# Patient Record
Sex: FEMALE | Race: WHITE | URBAN - METROPOLITAN AREA
[De-identification: names, ages, dates, MRNs, and addresses within clinical notes are randomized per-mention and may not be internally consistent; named-entity substitution may affect disease eponyms.]

---

## 2020-11-19 ENCOUNTER — APPOINTMENT (RX ONLY)
Dept: URBAN - METROPOLITAN AREA CLINIC 4 | Facility: CLINIC | Age: 75
Setting detail: DERMATOLOGY
End: 2020-11-19

## 2020-11-19 DIAGNOSIS — L57.0 ACTINIC KERATOSIS: ICD-10-CM

## 2020-11-19 DIAGNOSIS — D18.0 HEMANGIOMA: ICD-10-CM

## 2020-11-19 DIAGNOSIS — L82.1 OTHER SEBORRHEIC KERATOSIS: ICD-10-CM

## 2020-11-19 DIAGNOSIS — L81.4 OTHER MELANIN HYPERPIGMENTATION: ICD-10-CM

## 2020-11-19 DIAGNOSIS — H02.40 UNSPECIFIED PTOSIS OF EYELID: ICD-10-CM

## 2020-11-19 DIAGNOSIS — D22 MELANOCYTIC NEVI: ICD-10-CM

## 2020-11-19 PROBLEM — D18.01 HEMANGIOMA OF SKIN AND SUBCUTANEOUS TISSUE: Status: ACTIVE | Noted: 2020-11-19

## 2020-11-19 PROBLEM — D22.62 MELANOCYTIC NEVI OF LEFT UPPER LIMB, INCLUDING SHOULDER: Status: ACTIVE | Noted: 2020-11-19

## 2020-11-19 PROBLEM — D22.61 MELANOCYTIC NEVI OF RIGHT UPPER LIMB, INCLUDING SHOULDER: Status: ACTIVE | Noted: 2020-11-19

## 2020-11-19 PROBLEM — H02.401 UNSPECIFIED PTOSIS OF RIGHT EYELID: Status: ACTIVE | Noted: 2020-11-19

## 2020-11-19 PROBLEM — D22.5 MELANOCYTIC NEVI OF TRUNK: Status: ACTIVE | Noted: 2020-11-19

## 2020-11-19 PROCEDURE — 17003 DESTRUCT PREMALG LES 2-14: CPT

## 2020-11-19 PROCEDURE — 99202 OFFICE O/P NEW SF 15 MIN: CPT | Mod: 25

## 2020-11-19 PROCEDURE — ? COUNSELING

## 2020-11-19 PROCEDURE — ? LIQUID NITROGEN

## 2020-11-19 PROCEDURE — 17000 DESTRUCT PREMALG LESION: CPT

## 2020-11-19 PROCEDURE — ? ADDITIONAL NOTES

## 2020-11-19 ASSESSMENT — LOCATION SIMPLE DESCRIPTION DERM
LOCATION SIMPLE: RIGHT UPPER ARM
LOCATION SIMPLE: LEFT FOREARM
LOCATION SIMPLE: RIGHT UPPER BACK
LOCATION SIMPLE: LEFT FOREHEAD
LOCATION SIMPLE: RIGHT FOREHEAD
LOCATION SIMPLE: RIGHT SUPERIOR EYELID
LOCATION SIMPLE: RIGHT FOREARM
LOCATION SIMPLE: ABDOMEN
LOCATION SIMPLE: LEFT CHEEK
LOCATION SIMPLE: LOWER BACK
LOCATION SIMPLE: CHEST
LOCATION SIMPLE: LEFT LOWER LIP
LOCATION SIMPLE: LEFT UPPER ARM

## 2020-11-19 ASSESSMENT — LOCATION DETAILED DESCRIPTION DERM
LOCATION DETAILED: SUBXIPHOID
LOCATION DETAILED: RIGHT ANTERIOR DISTAL UPPER ARM
LOCATION DETAILED: RIGHT ANTECUBITAL SKIN
LOCATION DETAILED: PERIUMBILICAL SKIN
LOCATION DETAILED: RIGHT INFERIOR MEDIAL UPPER BACK
LOCATION DETAILED: RIGHT MEDIAL UPPER BACK
LOCATION DETAILED: LEFT INFERIOR CENTRAL MALAR CHEEK
LOCATION DETAILED: LEFT VENTRAL PROXIMAL FOREARM
LOCATION DETAILED: LEFT INFERIOR VERMILION BORDER
LOCATION DETAILED: RIGHT VENTRAL PROXIMAL FOREARM
LOCATION DETAILED: RIGHT LATERAL SUPERIOR EYELID
LOCATION DETAILED: RIGHT INFERIOR MEDIAL FOREHEAD
LOCATION DETAILED: LOWER STERNUM
LOCATION DETAILED: RIGHT INFERIOR UPPER BACK
LOCATION DETAILED: LEFT ANTECUBITAL SKIN
LOCATION DETAILED: LEFT VENTRAL DISTAL FOREARM
LOCATION DETAILED: SUPERIOR LUMBAR SPINE
LOCATION DETAILED: LEFT CENTRAL MALAR CHEEK
LOCATION DETAILED: LEFT INFERIOR MEDIAL FOREHEAD

## 2020-11-19 ASSESSMENT — LOCATION ZONE DERM
LOCATION ZONE: LIP
LOCATION ZONE: TRUNK
LOCATION ZONE: FACE
LOCATION ZONE: ARM
LOCATION ZONE: EYELID

## 2020-11-25 ENCOUNTER — PATIENT OUTREACH (OUTPATIENT)
Dept: HEALTH INFORMATION MANAGEMENT | Facility: OTHER | Age: 75
End: 2020-11-25

## 2020-11-25 NOTE — PROGRESS NOTES
Called member to schedule new PCP for [facility]. Appointment scheduled using EPIC. Member MENA verified.

## 2021-01-05 ASSESSMENT — ENCOUNTER SYMPTOMS
DIARRHEA: 0
CHILLS: 0
PALPITATIONS: 0
CONSTIPATION: 0
DEPRESSION: 0
SHORTNESS OF BREATH: 0
BLURRED VISION: 0
FEVER: 0
VOMITING: 0
NAUSEA: 0
WEAKNESS: 0

## 2021-01-05 NOTE — PROGRESS NOTES
History of Present Illness  75 year old female presents to clinic to establish care.  She notes some ADHD, which was formally tested in the 1980s.  She has been on Adderall ever since that time, with no changes in her medication.  She uses the Adderall 7 days a week, without holidays.  She is currently retired, but is still actively working through volunteering.  She has been out of her Adderall for about 1 month now.    She also notes some intermittent chest pain.  The pain is located on her left side and does radiate down her left arm, it is described as tingling when it comes.  The pain comes 3-4 times a week and comes with a very setting of activities, even sitting.  Last for the evening and then spontaneously resolved.  She does not take any medication for it.  There is no associated shortness of breath, nausea, vomiting, or diaphoresis.    Her eye doctor reported there might be some cholesterol he is seeing in her eye, and is wondering if she has high cholesterol.  Last labs through us were in 2012 and do indicate some hypercholesterolemia.  She is not on any medication for this.    She notes a chronic tremor in her hands bilaterally and has been on propanolol for this for several years.  She does not think the propanolol is helping and is wondering if it is still necessary.    She denies any other questions or concerns at this time.    ROS  Review of Systems   Constitutional: Negative for chills and fever.   HENT: Negative for hearing loss.    Eyes: Negative for blurred vision.   Respiratory: Negative for shortness of breath.    Cardiovascular: Negative for chest pain and palpitations.   Gastrointestinal: Negative for constipation, diarrhea, nausea and vomiting.   Genitourinary: Negative for dysuria and hematuria.   Skin: Negative for rash.   Neurological: Positive for tremors (chronic). Negative for weakness.        Chronic problems with concentration   Psychiatric/Behavioral: Negative for depression.  "    Medications  Current Outpatient Medications   Medication Sig Dispense Refill   • amphetamine-dextroamphetamine (ADDERALL, 30MG,) 30 MG tablet Take 30 mg by mouth every day.       No current facility-administered medications for this visit.      Allergies  No Known Allergies    Problem List  Patient Active Problem List   Diagnosis   • Attention deficit hyperactivity disorder (ADHD), predominantly inattentive type   • Overweight (BMI 25.0-29.9)     Past Medical History  Past Medical History:   Diagnosis Date   • Hyperlipidemia         Past Surgical History  History reviewed. No pertinent surgical history.     Past Family History  Family History   Problem Relation Age of Onset   • Allergies Mother    • Cancer Father         prostate   • Diabetes Sister    • Heart Disease Sister    • Stroke Brother    • Stroke Paternal Grandmother    • Cancer Paternal Grandfather         throat   • No Known Problems Sister    • Hyperlipidemia Son    • Heart Disease Daughter    • Hyperlipidemia Daughter      Social History  She reports eating a healthy and balanced diet, but unfortunately does not get regular exercise.  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 She drinks an average of 4-5 alcoholic beverages per week. She denies any tobacco product or illicit drug use. She is sexually active with one, male partner and they do not use any form of contraception.     Physical Exam  /82 (BP Location: Right arm, Patient Position: Sitting, BP Cuff Size: Adult)   Pulse 68   Temp 36.6 °C (97.8 °F) (Temporal)   Resp 14   Ht 1.6 m (5' 3\")   Wt 75.7 kg (166 lb 14.2 oz)   SpO2 97%   BMI 29.56 kg/m²   Physical Exam   Constitutional: She is well-developed, well-nourished, and in no distress. No distress.   HENT:   Head: Normocephalic and atraumatic.   Right Ear: Tympanic membrane, external ear and ear canal normal.   Left Ear: Tympanic membrane, external ear " and ear canal normal.   Eyes: Pupils are equal, round, and reactive to light. Right eye exhibits no discharge. Left eye exhibits no discharge. No scleral icterus.   Neck: No thyromegaly present.   Cardiovascular: Normal rate, regular rhythm and normal heart sounds.   Pulmonary/Chest: Effort normal and breath sounds normal. No respiratory distress.   Abdominal: Soft. Bowel sounds are normal. She exhibits no distension. There is no abdominal tenderness.   Musculoskeletal:         General: No edema.   Neurological: She is alert.   Skin: Skin is warm and dry. She is not diaphoretic.   Psychiatric: Affect and judgment normal.     Assessment & Plan  1. Attention deficit hyperactivity disorder (ADHD), predominantly inattentive type  I informed the patient that I do not prescribe controlled substances on her first encounter, she will make a follow-up in a couple weeks, to see how she is doing without the..  I did review her PDMP.  She is aware of the need to sign a controlled substance agreement on required annual drug screen.  We'll plan to continue the same dose of Adderall if her concentration problems continue without it.    2. Chest pain, unspecified type  When asked why she doesn't go to the emergency department for these chest pains, she states they are not really that bad.  No abnormalities noted on cardiac exam today.  We'll get some labs to further evaluate and continue to monitor.    3. Hypercholesterolemia  Repeat lipid panel and prescribe medication as appropriate.  - Comp Metabolic Panel; Future  - Lipid Profile; Future    4. Tremor  Since the propanolol is not effective we will trial stopping it.  If she would like to restart it again she will let me know.    5. Need for hepatitis C screening test  One-time hepatitis C screening is needed, order has been placed.  - HCV Scrn ( [id number] 1xLife); Future    6.  Overweight (BMI 25.0-29.9)  We reviewed the recommended amount of exercise, the patient will start  trying to incorporate exercise in her daily routine.    Return in about 2 weeks (around 1/21/2021) for ADHD management .    Divine Gamboa M.D.

## 2021-01-07 ENCOUNTER — OFFICE VISIT (OUTPATIENT)
Dept: MEDICAL GROUP | Facility: MEDICAL CENTER | Age: 76
End: 2021-01-07
Payer: MEDICARE

## 2021-01-07 VITALS
SYSTOLIC BLOOD PRESSURE: 128 MMHG | HEIGHT: 63 IN | DIASTOLIC BLOOD PRESSURE: 82 MMHG | BODY MASS INDEX: 29.57 KG/M2 | TEMPERATURE: 97.8 F | OXYGEN SATURATION: 97 % | RESPIRATION RATE: 14 BRPM | WEIGHT: 166.89 LBS | HEART RATE: 68 BPM

## 2021-01-07 DIAGNOSIS — F90.0 ATTENTION DEFICIT HYPERACTIVITY DISORDER (ADHD), PREDOMINANTLY INATTENTIVE TYPE: ICD-10-CM

## 2021-01-07 DIAGNOSIS — R25.1 TREMOR: ICD-10-CM

## 2021-01-07 DIAGNOSIS — Z11.59 NEED FOR HEPATITIS C SCREENING TEST: ICD-10-CM

## 2021-01-07 DIAGNOSIS — E78.00 HYPERCHOLESTEROLEMIA: ICD-10-CM

## 2021-01-07 DIAGNOSIS — E66.3 OVERWEIGHT (BMI 25.0-29.9): ICD-10-CM

## 2021-01-07 DIAGNOSIS — R07.9 CHEST PAIN, UNSPECIFIED TYPE: ICD-10-CM

## 2021-01-07 PROCEDURE — 99203 OFFICE O/P NEW LOW 30 MIN: CPT | Performed by: FAMILY MEDICINE

## 2021-01-07 RX ORDER — DEXTROAMPHETAMINE SACCHARATE, AMPHETAMINE ASPARTATE, DEXTROAMPHETAMINE SULFATE AND AMPHETAMINE SULFATE 7.5; 7.5; 7.5; 7.5 MG/1; MG/1; MG/1; MG/1
30 TABLET ORAL DAILY
COMMUNITY
End: 2021-01-26 | Stop reason: SDUPTHER

## 2021-01-07 RX ORDER — PROPRANOLOL HYDROCHLORIDE 10 MG/1
10 TABLET ORAL 2 TIMES DAILY
COMMUNITY
End: 2021-01-07

## 2021-01-07 ASSESSMENT — PATIENT HEALTH QUESTIONNAIRE - PHQ9
CLINICAL INTERPRETATION OF PHQ2 SCORE: 2
SUM OF ALL RESPONSES TO PHQ QUESTIONS 1-9: 5
5. POOR APPETITE OR OVEREATING: 1 - SEVERAL DAYS

## 2021-01-07 ASSESSMENT — ENCOUNTER SYMPTOMS: TREMORS: 1

## 2021-01-15 DIAGNOSIS — Z23 NEED FOR VACCINATION: ICD-10-CM

## 2021-01-21 ENCOUNTER — TELEPHONE (OUTPATIENT)
Dept: MEDICAL GROUP | Facility: MEDICAL CENTER | Age: 76
End: 2021-01-21

## 2021-01-21 NOTE — TELEPHONE ENCOUNTER
ESTABLISHED PATIENT PRE-VISIT PLANNING     Patient was NOT contacted to complete PVP.     Note: Patient will not be contacted if there is no indication to call.     1.  Reviewed notes from the last few office visits within the medical group: Yes    2.  If any orders were placed at last visit or intended to be done for this visit (i.e. 6 mos follow-up), do we have Results/Consult Notes?         •  Labs - Labs ordered, but not to be completed until 01/20/22.  Note: If patient appointment is for lab review and patient did not complete labs, check with provider if OK to reschedule patient until labs completed.       •  Imaging - Imaging was not ordered at last office visit.       •  Referrals - No referrals were ordered at last office visit.    3. Is this appointment scheduled as a Hospital Follow-Up? No    4.  Immunizations were updated in Epic using Reconcile Outside Information activity? Yes    5.  Patient is due for the following Health Maintenance Topics:   Health Maintenance Due   Topic Date Due   • Annual Wellness Visit  1945   • HEPATITIS C SCREENING  1945   • COVID-19 Vaccine (1 of 2) 08/20/1962   • IMM DTaP/Tdap/Td Vaccine (1 - Tdap) 08/20/1965   • IMM ZOSTER VACCINES (1 of 2) 08/20/1996     6.  AHA (Pulse8) form printed for Provider? N/A

## 2021-01-25 NOTE — PROGRESS NOTES
"History of Present Illness  75 year old female presents to clinic for follow-up of her ADHD. She was formerly tested in the 1980s and has been on Adderall since that time.  When she has Adderall, she uses it every day of the week, and does not take all the days.  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  She has been out of her Adderall for about 1 month now, and does have a very hard time focusing at work.      She denies any other questions or concerns at this time.    Current problem list, current medication, and past medical/surgical history were reviewed.     ROS  See HPI    Physical Exam  /72 (BP Location: Left arm, Patient Position: Sitting, BP Cuff Size: Adult)   Pulse 72   Temp 36.7 °C (98.1 °F) (Temporal)   Resp 16   Ht 1.575 m (5' 2\")   Wt 76 kg (167 lb 8.8 oz)   SpO2 96%   BMI 30.65 kg/m²   Physical Exam   Constitutional: She is well-developed, well-nourished, and in no distress. No distress.   Cardiovascular: Normal rate, regular rhythm and normal heart sounds.   Pulmonary/Chest: Effort normal and breath sounds normal. No respiratory distress.   Abdominal: Soft. Bowel sounds are normal.   Neurological: She is alert.   Skin: Skin is warm and dry. She is not diaphoretic.   Psychiatric: Affect and judgment normal.     Assessment & Plan  1. Attention deficit hyperactivity disorder (ADHD), predominantly inattentive type  I did send a referral of her Adderall, but encouraged her to take it only Monday through Friday, or on the days when she is working.  Controlled substance agreement signed.  We will plan to get an annual drug screen at her next appointment. Obtained and reviewed patient utilization report from [facility] pharmacy database on 1/26/2021 11:20 AM  prior to writing prescription for controlled substance II, III or IV per [place] bill . Based on assessment of the report, my physical exam, and the patient's " health problem, the prescription is medically necessary.   - amphetamine-dextroamphetamine (ADDERALL, 30MG,) 30 MG tablet; Take 1 Tab by mouth every day for 60 days.  Dispense: 60 Tab; Refill: 0  - Controlled Substance Treatment Agreement      Return in about 3 months (around 4/26/2021) for ADHD management .    Divine Gamboa M.D.

## 2021-01-26 ENCOUNTER — OFFICE VISIT (OUTPATIENT)
Dept: MEDICAL GROUP | Facility: MEDICAL CENTER | Age: 76
End: 2021-01-26
Payer: MEDICARE

## 2021-01-26 VITALS
DIASTOLIC BLOOD PRESSURE: 72 MMHG | RESPIRATION RATE: 16 BRPM | OXYGEN SATURATION: 96 % | HEART RATE: 72 BPM | WEIGHT: 167.55 LBS | SYSTOLIC BLOOD PRESSURE: 140 MMHG | BODY MASS INDEX: 30.83 KG/M2 | HEIGHT: 62 IN | TEMPERATURE: 98.1 F

## 2021-01-26 DIAGNOSIS — F90.0 ATTENTION DEFICIT HYPERACTIVITY DISORDER (ADHD), PREDOMINANTLY INATTENTIVE TYPE: ICD-10-CM

## 2021-01-26 PROCEDURE — 99213 OFFICE O/P EST LOW 20 MIN: CPT | Performed by: FAMILY MEDICINE

## 2021-01-26 RX ORDER — DEXTROAMPHETAMINE SACCHARATE, AMPHETAMINE ASPARTATE, DEXTROAMPHETAMINE SULFATE AND AMPHETAMINE SULFATE 7.5; 7.5; 7.5; 7.5 MG/1; MG/1; MG/1; MG/1
30 TABLET ORAL DAILY
Qty: 60 TAB | Refills: 0 | Status: SHIPPED | OUTPATIENT
Start: 2021-01-26 | End: 2021-03-27

## 2021-01-26 RX ORDER — IBUPROFEN 800 MG/1
800 TABLET ORAL EVERY 8 HOURS PRN
Qty: 30 TAB | Refills: 0 | Status: SHIPPED | OUTPATIENT
Start: 2021-01-26 | End: 2022-08-27

## 2021-04-06 ENCOUNTER — PATIENT MESSAGE (OUTPATIENT)
Dept: HEALTH INFORMATION MANAGEMENT | Facility: OTHER | Age: 76
End: 2021-04-06

## 2021-06-14 ENCOUNTER — OFFICE VISIT (OUTPATIENT)
Dept: MEDICAL GROUP | Facility: PHYSICIAN GROUP | Age: 76
End: 2021-06-14
Payer: MEDICARE

## 2021-06-14 VITALS
OXYGEN SATURATION: 96 % | DIASTOLIC BLOOD PRESSURE: 70 MMHG | HEIGHT: 61 IN | HEART RATE: 69 BPM | TEMPERATURE: 97.5 F | WEIGHT: 162.1 LBS | SYSTOLIC BLOOD PRESSURE: 138 MMHG | BODY MASS INDEX: 30.61 KG/M2

## 2021-06-14 DIAGNOSIS — E78.2 MIXED HYPERLIPIDEMIA: ICD-10-CM

## 2021-06-14 DIAGNOSIS — G25.0 ESSENTIAL TREMOR: ICD-10-CM

## 2021-06-14 DIAGNOSIS — Z13.6 ENCOUNTER FOR LIPID SCREENING FOR CARDIOVASCULAR DISEASE: ICD-10-CM

## 2021-06-14 DIAGNOSIS — R20.2 LEG PARESTHESIA: ICD-10-CM

## 2021-06-14 DIAGNOSIS — M79.10 MUSCLE PAIN: ICD-10-CM

## 2021-06-14 DIAGNOSIS — E55.9 VITAMIN D DEFICIENCY: ICD-10-CM

## 2021-06-14 DIAGNOSIS — Z11.59 ENCOUNTER FOR HEPATITIS C SCREENING TEST FOR LOW RISK PATIENT: ICD-10-CM

## 2021-06-14 DIAGNOSIS — Z13.220 ENCOUNTER FOR LIPID SCREENING FOR CARDIOVASCULAR DISEASE: ICD-10-CM

## 2021-06-14 DIAGNOSIS — F90.0 ATTENTION DEFICIT HYPERACTIVITY DISORDER (ADHD), PREDOMINANTLY INATTENTIVE TYPE: ICD-10-CM

## 2021-06-14 PROBLEM — E66.3 OVERWEIGHT (BMI 25.0-29.9): Status: RESOLVED | Noted: 2021-01-07 | Resolved: 2021-06-14

## 2021-06-14 PROCEDURE — 99204 OFFICE O/P NEW MOD 45 MIN: CPT | Performed by: INTERNAL MEDICINE

## 2021-06-14 RX ORDER — DEXTROAMPHETAMINE SACCHARATE, AMPHETAMINE ASPARTATE, DEXTROAMPHETAMINE SULFATE AND AMPHETAMINE SULFATE 7.5; 7.5; 7.5; 7.5 MG/1; MG/1; MG/1; MG/1
30 TABLET ORAL
COMMUNITY
End: 2021-08-25 | Stop reason: SDUPTHER

## 2021-06-14 RX ORDER — PROPRANOLOL HYDROCHLORIDE 10 MG/1
10 TABLET ORAL
COMMUNITY
Start: 2021-04-03 | End: 2021-08-25 | Stop reason: SDUPTHER

## 2021-06-14 RX ORDER — MULTIVIT WITH MINERALS/LUTEIN
TABLET ORAL
COMMUNITY
End: 2022-08-27

## 2021-06-14 RX ORDER — CHOLECALCIFEROL (VITAMIN D3) 125 MCG
500 CAPSULE ORAL DAILY
COMMUNITY
End: 2022-08-27

## 2021-06-14 NOTE — LETTER
[employer]  Farhana Quintanilla D.O.  [address]  [place] [address]  Fax: [phone]   Authorization for Release/Disclosure of   Protected Health Information   Name: RESHMA OVIEDO : 1945 SSN: [id number]   Address: [address] [place] [address] Phone:    [phone] (home)    I authorize the entity listed below to release/disclose the PHI below to:   [employer]/Farhana Quintanilla D.O. and Farhana Quintanilla D.O.   Provider or Entity Name:  [facility]   Address   City, State, [facility]   Phone:      Fax:     Reason for request: continuity of care   Information to be released:    [  ] LAST COLONOSCOPY,  including any PATH REPORT and follow-up  [  ] LAST FIT/COLOGUARD RESULT [  ] LAST DEXA  [  ] LAST MAMMOGRAM  [  ] LAST PAP  [  ] LAST LABS [  ] RETINA EXAM REPORT  [  ] IMMUNIZATION RECORDS  [ x ] Release all info      [ x ] Check here and initial the line next to each item to release ALL health information INCLUDING  _____ Care and treatment for drug and / or alcohol abuse  _____ HIV testing, infection status, or AIDS  _____ Genetic Testing    DATES OF SERVICE OR TIME PERIOD TO BE DISCLOSED: __-___________  I understand and acknowledge that:  * This Authorization may be revoked at any time by you in writing, except if your health information has already been used or disclosed.  * Your health information that will be used or disclosed as a result of you signing this authorization could be re-disclosed by the recipient. If this occurs, your re-disclosed health information may no longer be protected by State or Federal laws.  * You may refuse to sign this Authorization. Your refusal will not affect your ability to obtain treatment.  * This Authorization becomes effective upon signing and will  on (date) __________.      If no date is indicated, this Authorization will  one (1) year from the signature date.    Name: Reshma Oviedo    Signature:   Date:     2021        PLEASE FAX REQUESTED RECORDS BACK TO: [phone]

## 2021-06-14 NOTE — PROGRESS NOTES
Subjective:     CC:  Establish care    HISTORY OF THE PRESENT ILLNESS: Cherelle Crystal is a 75 y.o. female here today to establish primary medical care and discuss the below stated chronic medical conditions. Cherelle is unaccompanied for today's visit.    Problem   Essential Tremor    She reports essential tremor noticeable mostly in the left hand 2nd digit but it can also involve both hands. It has slightly progressed over the past year. She was given propranolol 10 mg which she has not used regularly at this point, worried about potential side effects. I do not have outside records available to review.     Leg Paresthesia    She reports abnormal sensations in her bilateral lower extremities. Starts int he feet and goes up to the knees with some additional abnormalities in the bilateral upper extremities. No recent lab work. No preceding injury. Denies known side effects from from medications.     Vitamin D Deficiency    She has a history of vitamin D deficiency, no current supplementation. No recent lab evaluation.     Mixed Hyperlipidemia       Ref. Range 9/4/2012 12:15   Cholesterol,Tot Latest Ref Range: 100 - 199 mg/dL 223 (H)   Triglycerides Latest Ref Range: 0 - 149 mg/dL 64   HDL Latest Ref Range: >=40 mg/dL 76   LDL Latest Ref Range: <100 mg/dL 134     She has a past history of elevated cholesterol, has not taken medication and no recent lab work or physician visits. No current pharmacotherapy.     Muscle Pain    She reports abnormal sensation in the bilateral legs going from feet upward. She also report similar feelings in her bilateral arms. No clear inciting cause, no recent lab evaluation. No prior medical records available for review.     Attention Deficit Hyperactivity Disorder (Adhd), Predominantly Inattentive Type    She reports diagnosis of ADHD at age 41. This was made by her primary care physician, she does not ever recall seeing a psychiatrist. She had challenges with attention related to her job.  "She was started on Adderall which was increased to 30 mg within the first few months. She denies any side effects from the medicine. She reports not seeing a physician regularly since 2012, unclear how this was being filled without regular labs or visits. 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     Overweight (Bmi 25.0-29.9) (Resolved)        Current Medications:  Current Outpatient Medications Ordered in Epic   Medication Sig Dispense Refill   • propranolol (INDERAL) 10 MG Tab Take 10 mg by mouth.     • amphetamine-dextroamphetamine (ADDERALL, 30MG,) 30 MG tablet Take 30 mg by mouth 1 time a day as needed.     • cyanocobalamin (VITAMIN B-12) 500 MCG Tab Take 500 mcg by mouth every day.     • Glucosamine-Chondroitin (GLUCOSAMINE CHONDR COMPLEX PO) Take  by mouth.     • Multiple Vitamins-Minerals (PRESERVISION AREDS PO) Take  by mouth.     • Ascorbic Acid (VITAMIN C) 1000 MG Tab Take  by mouth.     • ibuprofen (MOTRIN) 800 MG Tab Take 1 Tab by mouth every 8 hours as needed. 30 Tab 0     No current Epic-ordered facility-administered medications on file.       PMH, PSH, Social History, Medications, Allergies, FMH updated and reviewed as documented:    Objective:   Physical Exam:    Vitals: /70 (BP Location: Left arm, Patient Position: Sitting, BP Cuff Size: Adult)   Pulse 69   Temp 36.4 °C (97.5 °F) (Temporal)   Ht 1.549 m (5' 1\")   Wt 73.5 kg (162 lb 1.6 oz)   SpO2 96%    BMI: Body mass index is 30.63 kg/m².  Physical Exam  Constitutional:       General: She is not in acute distress.     Appearance: Normal appearance. She is not ill-appearing.   HENT:      Right Ear: Tympanic membrane, ear canal and external ear normal. There is no impacted cerumen.      Left Ear: Tympanic membrane, ear canal and external ear normal. There is no impacted cerumen.   Eyes:      General: No scleral icterus.     Conjunctiva/sclera: " Conjunctivae normal.      Pupils: Pupils are equal, round, and reactive to light.   Neck:      Vascular: No carotid bruit.   Cardiovascular:      Rate and Rhythm: Normal rate and regular rhythm.      Pulses: Normal pulses.      Heart sounds: No murmur heard.     Pulmonary:      Effort: Pulmonary effort is normal. No respiratory distress.      Breath sounds: Normal breath sounds. No wheezing.   Musculoskeletal:      Right lower leg: No edema.      Left lower leg: No edema.   Skin:     General: Skin is warm and dry.      Findings: No rash.   Psychiatric:         Mood and Affect: Mood normal.         Behavior: Behavior normal.         Thought Content: Thought content normal.         Judgment: Judgment normal.          Assessment & Plan:   Cherelle is a 75 y.o. female with the following:  Problem List Items Addressed This Visit     Attention deficit hyperactivity disorder (ADHD), predominantly inattentive type     Chronic and decompensated problem. Will refer her to psychiatry to establish care, she has never seen psychiatry and reports the ADHD diagnosis was given in her 40s. No records available as this was done by her former PCP in [place] and that clinic has since been shut down. Will get her labs updated to ensure safety before resuming this medicine.         Relevant Orders    REFERRAL TO PSYCHIATRY    Essential tremor     Chronic and ongoing issue. Will request previous medical records. Tremor very subtle at this time. Can continue propranolol 10 mg as needed, she reports feeling slight orthostasis so trying to limit. Can consider other alternative therapies if symptoms progress.         Relevant Orders    CBC WITH DIFFERENTIAL    Comp Metabolic Panel    VITAMIN D,25 HYDROXY    Leg paresthesia     New problem that requires additional evaluation. Will update lab work to look for organic causes to her symptoms. May require imaging for further elucidation based on findings. Will have her return in a few weeks to review.          Relevant Orders    CBC WITH DIFFERENTIAL    Comp Metabolic Panel    TSH WITH REFLEX TO FT4    VITAMIN B12    Vitamin D deficiency     New problem, requires follow up. Update vitamin D level and adjust supplementation as indicated.         Mixed hyperlipidemia     New problem, no prior treatment or evaluation. Will update lipid panel and recommend treatment based on findings. Will request records from previous physician.         Relevant Medications    propranolol (INDERAL) 10 MG Tab    Other Relevant Orders    CBC WITH DIFFERENTIAL    Comp Metabolic Panel    Lipid Profile    TSH WITH REFLEX TO FT4    Muscle pain     New problem, requires follow up. Check for reversible causes/deficiencies. Unlikely to be a medicine side effect. Evaluate for polymyalgia rheumatica with ESR. Will have her follow up in a few weeks and decide on additional evaluation at that time. Check B12, vitamin D, and thyroid labs as well.         Relevant Orders    Sed Rate      Other Visit Diagnoses     Encounter for lipid screening for cardiovascular disease        Relevant Orders    Lipid Profile    Encounter for hepatitis C screening test for low risk patient        Relevant Orders    HEP C VIRUS ANTIBODY           RTC: Return in about 3 weeks (around 7/5/2021) for lab draw with RNAna #1 on same day.    I spent a total of 50 minutes with record review, exam, communication with the patient, communication with other providers, and documentation of this encounter.    PLEASE NOTE: This dictation was created using voice recognition software. I have made every reasonable attempt to correct obvious errors, but I expect that there are errors of grammar and possibly content that I did not discover before finalizing the note.      Farhana Quintanilla, DO  Geriatric and Internal Medicine  [facility]

## 2021-06-15 NOTE — ASSESSMENT & PLAN NOTE
Chronic and decompensated problem. Will refer her to psychiatry to establish care, she has never seen psychiatry and reports the ADHD diagnosis was given in her 40s. No records available as this was done by her former PCP in [place] and that clinic has since been shut down. Will get her labs updated to ensure safety before resuming this medicine.  
Chronic and ongoing issue. Will request previous medical records. Tremor very subtle at this time. Can continue propranolol 10 mg as needed, she reports feeling slight orthostasis so trying to limit. Can consider other alternative therapies if symptoms progress.  
New problem that requires additional evaluation. Will update lab work to look for organic causes to her symptoms. May require imaging for further elucidation based on findings. Will have her return in a few weeks to review.  
New problem, no prior treatment or evaluation. Will update lipid panel and recommend treatment based on findings. Will request records from previous physician.  
New problem, requires follow up. Check for reversible causes/deficiencies. Unlikely to be a medicine side effect. Evaluate for polymyalgia rheumatica with ESR. Will have her follow up in a few weeks and decide on additional evaluation at that time. Check B12, vitamin D, and thyroid labs as well.  
New problem, requires follow up. Update vitamin D level and adjust supplementation as indicated.  
normal (ped)...

## 2021-07-06 ENCOUNTER — TELEPHONE (OUTPATIENT)
Dept: MEDICAL GROUP | Facility: PHYSICIAN GROUP | Age: 76
End: 2021-07-06

## 2021-07-06 NOTE — TELEPHONE ENCOUNTER
Pt missed 8am blood draw and Shingrix administration appt. Called pt, no answer, LVM asking pt to call [phone] and re-schedule appt.

## 2021-07-12 ENCOUNTER — NON-PROVIDER VISIT (OUTPATIENT)
Dept: MEDICAL GROUP | Facility: PHYSICIAN GROUP | Age: 76
End: 2021-07-12
Payer: MEDICARE

## 2021-07-12 ENCOUNTER — HOSPITAL ENCOUNTER (OUTPATIENT)
Facility: MEDICAL CENTER | Age: 76
End: 2021-07-12
Attending: INTERNAL MEDICINE
Payer: MEDICARE

## 2021-07-12 DIAGNOSIS — E78.2 MIXED HYPERLIPIDEMIA: ICD-10-CM

## 2021-07-12 DIAGNOSIS — Z11.59 ENCOUNTER FOR HEPATITIS C SCREENING TEST FOR LOW RISK PATIENT: ICD-10-CM

## 2021-07-12 DIAGNOSIS — Z23 NEED FOR VACCINATION: Primary | ICD-10-CM

## 2021-07-12 DIAGNOSIS — Z13.6 ENCOUNTER FOR LIPID SCREENING FOR CARDIOVASCULAR DISEASE: ICD-10-CM

## 2021-07-12 DIAGNOSIS — M79.10 MUSCLE PAIN: ICD-10-CM

## 2021-07-12 DIAGNOSIS — Z13.220 ENCOUNTER FOR LIPID SCREENING FOR CARDIOVASCULAR DISEASE: ICD-10-CM

## 2021-07-12 DIAGNOSIS — R20.2 LEG PARESTHESIA: ICD-10-CM

## 2021-07-12 DIAGNOSIS — G25.0 ESSENTIAL TREMOR: ICD-10-CM

## 2021-07-12 PROCEDURE — 82607 VITAMIN B-12: CPT

## 2021-07-12 PROCEDURE — 80061 LIPID PANEL: CPT

## 2021-07-12 PROCEDURE — 85652 RBC SED RATE AUTOMATED: CPT

## 2021-07-12 PROCEDURE — 86803 HEPATITIS C AB TEST: CPT

## 2021-07-12 PROCEDURE — 85025 COMPLETE CBC W/AUTO DIFF WBC: CPT

## 2021-07-12 PROCEDURE — 90750 HZV VACC RECOMBINANT IM: CPT | Performed by: INTERNAL MEDICINE

## 2021-07-12 PROCEDURE — 82306 VITAMIN D 25 HYDROXY: CPT

## 2021-07-12 PROCEDURE — 84443 ASSAY THYROID STIM HORMONE: CPT

## 2021-07-12 PROCEDURE — 80053 COMPREHEN METABOLIC PANEL: CPT

## 2021-07-12 PROCEDURE — 90471 IMMUNIZATION ADMIN: CPT | Performed by: INTERNAL MEDICINE

## 2021-07-12 PROCEDURE — 36415 COLL VENOUS BLD VENIPUNCTURE: CPT | Performed by: INTERNAL MEDICINE

## 2021-07-12 NOTE — PROGRESS NOTES
Patient arrived for blood draw.   Verified patient's name/date-of-birth and reason for visit before procedure was started. Patient's left antecubital cleansed. Venipuncture attempts X1. Blood draw completed on patient's left AC.     Applied pressure afterwards and placed Coban on site of venipuncture with directions for patient to remove within the next hour. Patient tolerated procedure well, no adverse reactions. Patient ambulated safely upon leaving clinic.   Completed labels were placed on blood samples in draw room with patient present. Appropriate blood samples were centrifuged. Blood samples were then placed in locked lab box for  pick-up.

## 2021-07-13 LAB
25(OH)D3 SERPL-MCNC: 23 NG/ML (ref 30–100)
ALBUMIN SERPL BCP-MCNC: 4.3 G/DL (ref 3.2–4.9)
ALBUMIN/GLOB SERPL: 1.5 G/DL
ALP SERPL-CCNC: 52 U/L (ref 30–99)
ALT SERPL-CCNC: 14 U/L (ref 2–50)
ANION GAP SERPL CALC-SCNC: 13 MMOL/L (ref 7–16)
AST SERPL-CCNC: 19 U/L (ref 12–45)
BASOPHILS # BLD AUTO: 0.8 % (ref 0–1.8)
BASOPHILS # BLD: 0.05 K/UL (ref 0–0.12)
BILIRUB SERPL-MCNC: 0.3 MG/DL (ref 0.1–1.5)
BUN SERPL-MCNC: 14 MG/DL (ref 8–22)
CALCIUM SERPL-MCNC: 9.2 MG/DL (ref 8.5–10.5)
CHLORIDE SERPL-SCNC: 101 MMOL/L (ref 96–112)
CHOLEST SERPL-MCNC: 244 MG/DL (ref 100–199)
CO2 SERPL-SCNC: 23 MMOL/L (ref 20–33)
CREAT SERPL-MCNC: 0.66 MG/DL (ref 0.5–1.4)
EOSINOPHIL # BLD AUTO: 0.22 K/UL (ref 0–0.51)
EOSINOPHIL NFR BLD: 3.3 % (ref 0–6.9)
ERYTHROCYTE [DISTWIDTH] IN BLOOD BY AUTOMATED COUNT: 56.1 FL (ref 35.9–50)
ERYTHROCYTE [SEDIMENTATION RATE] IN BLOOD BY WESTERGREN METHOD: 27 MM/HOUR (ref 0–25)
GLOBULIN SER CALC-MCNC: 2.8 G/DL (ref 1.9–3.5)
GLUCOSE SERPL-MCNC: 68 MG/DL (ref 65–99)
HCT VFR BLD AUTO: 39.9 % (ref 37–47)
HCV AB SER QL: NORMAL
HDLC SERPL-MCNC: 60 MG/DL
HGB BLD-MCNC: 12.4 G/DL (ref 12–16)
IMM GRANULOCYTES # BLD AUTO: 0.03 K/UL (ref 0–0.11)
IMM GRANULOCYTES NFR BLD AUTO: 0.5 % (ref 0–0.9)
LDLC SERPL CALC-MCNC: 154 MG/DL
LYMPHOCYTES # BLD AUTO: 1.82 K/UL (ref 1–4.8)
LYMPHOCYTES NFR BLD: 27.5 % (ref 22–41)
MCH RBC QN AUTO: 31.8 PG (ref 27–33)
MCHC RBC AUTO-ENTMCNC: 31.1 G/DL (ref 33.6–35)
MCV RBC AUTO: 102.3 FL (ref 81.4–97.8)
MONOCYTES # BLD AUTO: 0.75 K/UL (ref 0–0.85)
MONOCYTES NFR BLD AUTO: 11.3 % (ref 0–13.4)
NEUTROPHILS # BLD AUTO: 3.75 K/UL (ref 2–7.15)
NEUTROPHILS NFR BLD: 56.6 % (ref 44–72)
NRBC # BLD AUTO: 0 K/UL
NRBC BLD-RTO: 0 /100 WBC
PLATELET # BLD AUTO: 193 K/UL (ref 164–446)
PMV BLD AUTO: 13.9 FL (ref 9–12.9)
POTASSIUM SERPL-SCNC: 4.3 MMOL/L (ref 3.6–5.5)
PROT SERPL-MCNC: 7.1 G/DL (ref 6–8.2)
RBC # BLD AUTO: 3.9 M/UL (ref 4.2–5.4)
SODIUM SERPL-SCNC: 137 MMOL/L (ref 135–145)
TRIGL SERPL-MCNC: 151 MG/DL (ref 0–149)
TSH SERPL DL<=0.005 MIU/L-ACNC: 3.28 UIU/ML (ref 0.38–5.33)
VIT B12 SERPL-MCNC: 3194 PG/ML (ref 211–911)
WBC # BLD AUTO: 6.6 K/UL (ref 4.8–10.8)

## 2021-08-25 ENCOUNTER — OFFICE VISIT (OUTPATIENT)
Dept: MEDICAL GROUP | Facility: PHYSICIAN GROUP | Age: 76
End: 2021-08-25
Payer: MEDICARE

## 2021-08-25 ENCOUNTER — PATIENT OUTREACH (OUTPATIENT)
Dept: HEALTH INFORMATION MANAGEMENT | Facility: OTHER | Age: 76
End: 2021-08-25

## 2021-08-25 VITALS
RESPIRATION RATE: 12 BRPM | OXYGEN SATURATION: 95 % | WEIGHT: 161.3 LBS | TEMPERATURE: 97.3 F | BODY MASS INDEX: 30.45 KG/M2 | HEIGHT: 61 IN | SYSTOLIC BLOOD PRESSURE: 124 MMHG | DIASTOLIC BLOOD PRESSURE: 60 MMHG | HEART RATE: 64 BPM

## 2021-08-25 DIAGNOSIS — Z91.89 OTHER SPECIFIED PERSONAL RISK FACTORS, NOT ELSEWHERE CLASSIFIED: ICD-10-CM

## 2021-08-25 DIAGNOSIS — F90.0 ATTENTION DEFICIT HYPERACTIVITY DISORDER (ADHD), PREDOMINANTLY INATTENTIVE TYPE: ICD-10-CM

## 2021-08-25 DIAGNOSIS — N39.41 URGENCY INCONTINENCE: ICD-10-CM

## 2021-08-25 DIAGNOSIS — E55.9 VITAMIN D DEFICIENCY: ICD-10-CM

## 2021-08-25 DIAGNOSIS — G25.0 ESSENTIAL TREMOR: ICD-10-CM

## 2021-08-25 DIAGNOSIS — E78.2 MIXED HYPERLIPIDEMIA: ICD-10-CM

## 2021-08-25 DIAGNOSIS — R07.9 CHEST PAIN, UNSPECIFIED TYPE: ICD-10-CM

## 2021-08-25 PROBLEM — R20.2 LEG PARESTHESIA: Status: RESOLVED | Noted: 2021-06-14 | Resolved: 2021-08-25

## 2021-08-25 PROCEDURE — 99215 OFFICE O/P EST HI 40 MIN: CPT | Performed by: INTERNAL MEDICINE

## 2021-08-25 RX ORDER — DEXTROAMPHETAMINE SACCHARATE, AMPHETAMINE ASPARTATE, DEXTROAMPHETAMINE SULFATE AND AMPHETAMINE SULFATE 7.5; 7.5; 7.5; 7.5 MG/1; MG/1; MG/1; MG/1
30 TABLET ORAL
Qty: 60 TABLET | Refills: 0 | Status: SHIPPED | OUTPATIENT
Start: 2021-10-24 | End: 2021-11-16 | Stop reason: SDUPTHER

## 2021-08-25 RX ORDER — DEXTROAMPHETAMINE SACCHARATE, AMPHETAMINE ASPARTATE, DEXTROAMPHETAMINE SULFATE AND AMPHETAMINE SULFATE 7.5; 7.5; 7.5; 7.5 MG/1; MG/1; MG/1; MG/1
30 TABLET ORAL
Qty: 60 TABLET | Refills: 0 | Status: SHIPPED | OUTPATIENT
Start: 2021-08-25 | End: 2021-09-24

## 2021-08-25 RX ORDER — DEXTROAMPHETAMINE SACCHARATE, AMPHETAMINE ASPARTATE, DEXTROAMPHETAMINE SULFATE AND AMPHETAMINE SULFATE 7.5; 7.5; 7.5; 7.5 MG/1; MG/1; MG/1; MG/1
30 TABLET ORAL
Qty: 60 TABLET | Refills: 0 | Status: SHIPPED | OUTPATIENT
Start: 2021-09-24 | End: 2021-10-24

## 2021-08-25 RX ORDER — PROPRANOLOL HYDROCHLORIDE 10 MG/1
10 TABLET ORAL DAILY
Qty: 100 TABLET | Refills: 3 | Status: SHIPPED | OUTPATIENT
Start: 2021-08-25 | End: 2022-05-23 | Stop reason: SDUPTHER

## 2021-08-25 ASSESSMENT — FIBROSIS 4 INDEX: FIB4 SCORE: 1.97

## 2021-08-25 NOTE — ASSESSMENT & PLAN NOTE
New and decompensated problem.  Likely longstanding but has never been addressed medically.  Will send in Myrbetriq 25 mg daily for her to try.  Advised her to monitor for elevated heart rate, blood pressure, or worsening of headaches.

## 2021-08-25 NOTE — PROGRESS NOTES
Subjective:   Chief Complaint/History of Present Illness:  Cherelle Crystal is a 76 y.o. female established patient who presents today to discuss medical problems as listed below. Cherelle is unaccompanied for today's visit.    Problem   Urgency Incontinence    She reports urge incontinence.  She does not have enough time to get to the bathroom and has to wear a pad due to urinary leakage.  She is never tried any medicines for this.  It has been insidious onset.  No history of recurrent urinary tract infections.  She has not tried any medicines for this problem before.     Chest Pain    She describes an ache or discomfort in the left chest that radiates to her left shoulder and arm.  It is not exertional at this time.  No prior occurrence.  No perceived cause of injury.  She thinks her mother had an MI at the end of her life related to an accident.  No prior cardiac evaluation.  She has elevated cholesterol but declines using medication.  She would be open to a cardiac evaluation with a stress test.  No associated diaphoresis, nausea, or dyspnea on exertion.     Essential Tremor    She reports essential tremor noticeable mostly in the left hand 2nd digit but it can also involve both hands. It has slightly progressed over the past year. She was given propranolol 10 mg which she has not used regularly at this point, worried about potential side effects.     Vitamin D Deficiency       Ref. Range 7/12/2021 14:35   25-Hydroxy   Vitamin D 25 Latest Ref Range: 30 - 100 ng/mL 23 (L)       She has a history of vitamin D deficiency, no current supplementation.     Mixed Hyperlipidemia       Ref. Range 9/4/2012 12:15   Cholesterol,Tot Latest Ref Range: 100 - 199 mg/dL 223 (H)   Triglycerides Latest Ref Range: 0 - 149 mg/dL 64   HDL Latest Ref Range: >=40 mg/dL 76   LDL Latest Ref Range: <100 mg/dL 134     The 10-year ASCVD risk score (Genoveva ROBERTO Jr., et al., 2013) is: 15.5%    She has a past history of elevated cholesterol, has not  taken medication and no recent lab work or physician visits. No current pharmacotherapy.  She reports has never been told she has had a cholesterol problem in the past.  She thinks her mother  from an MI but it was related to an accident.     Attention Deficit Hyperactivity Disorder (Adhd), Predominantly Inattentive Type    She reports diagnosis of ADHD at age 41. This was made by her primary care physician, she does not ever recall seeing a psychiatrist. She had challenges with attention related to her job. She was started on Adderall which was increased to 30 mg within the first few months. She denies any side effects from the medicine. She reports not seeing a physician regularly since , unclear how this was being filled without regular labs or visits. 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     Leg Paresthesia (Resolved)    She reports abnormal sensations in her bilateral lower extremities. Starts int he feet and goes up to the knees with some additional abnormalities in the bilateral upper extremities. No recent lab work. No preceding injury. Denies known side effects from from medications.          Current Medications:  Current Outpatient Medications Ordered in Epic   Medication Sig Dispense Refill   • Mirabegron ER 25 MG TABLET SR 24 HR Take 1 Tablet by mouth every day. 30 Tablet 1   • propranolol (INDERAL) 10 MG Tab Take 1 Tablet by mouth every day. 100 Tablet 3   • amphetamine-dextroamphetamine (ADDERALL, 30MG,) 30 MG tablet Take 1 Tablet by mouth 1 time a day as needed for up to 30 days. 60 Tablet 0   • [START ON 2021] amphetamine-dextroamphetamine (ADDERALL, 30MG,) 30 MG tablet Take 1 Tablet by mouth 1 time a day as needed for up to 30 days. 60 Tablet 0   • [START ON 10/24/2021] amphetamine-dextroamphetamine (ADDERALL, 30MG,) 30 MG tablet Take 1 Tablet by mouth 1 time a day as needed for up to 30 days. 60  "Tablet 0   • cyanocobalamin (VITAMIN B-12) 500 MCG Tab Take 500 mcg by mouth every day.     • Glucosamine-Chondroitin (GLUCOSAMINE CHONDR COMPLEX PO) Take  by mouth.     • Multiple Vitamins-Minerals (PRESERVISION AREDS PO) Take  by mouth.     • Ascorbic Acid (VITAMIN C) 1000 MG Tab Take  by mouth.     • ibuprofen (MOTRIN) 800 MG Tab Take 1 Tab by mouth every 8 hours as needed. 30 Tab 0     No current Epic-ordered facility-administered medications on file.          Objective:   Physical Exam:    Vitals: /60 (BP Location: Left arm, Patient Position: Sitting, BP Cuff Size: Adult)   Pulse 64   Temp 36.3 °C (97.3 °F) (Temporal)   Resp 12   Ht 1.549 m (5' 1\")   Wt 73.2 kg (161 lb 4.8 oz)   SpO2 95%    BMI: Body mass index is 30.48 kg/m².  Physical Exam  Constitutional:       General: She is not in acute distress.     Appearance: Normal appearance. She is obese. She is not ill-appearing.   HENT:      Ears:      Comments: Hearing grossly normal     Nose: Nose normal.   Eyes:      General: No scleral icterus.     Extraocular Movements: Extraocular movements intact.      Conjunctiva/sclera: Conjunctivae normal.   Cardiovascular:      Pulses: Normal pulses.   Pulmonary:      Effort: Pulmonary effort is normal. No respiratory distress.   Skin:     General: Skin is warm and dry.      Comments: SK on back of right arm and benign mole on left chest   Psychiatric:         Mood and Affect: Mood normal.         Behavior: Behavior normal.         Thought Content: Thought content normal.         Judgment: Judgment normal.      Comments: Increased stress following partner's knee replacement          Assessment and Plan:   Cherelle is a 76 y.o. female with the following:  Problem List Items Addressed This Visit     Attention deficit hyperactivity disorder (ADHD), predominantly inattentive type     Chronic and ongoing problem.  She did eventually make an appointment with psychiatry to establish care.  I have no past medical " records from her last doctor in [place] documenting the initiation of this medicine and what testing was done.  She would really like to stay on the medicine.  I want to make sure that psychiatry agrees that this is a safe medicine for her to use moving forward.  Also, she has been under increased stress with her partner who had a hemorrhagic stroke last year and then recently had a right knee replacement and I think it would be good to have someone talk to her about her mental health in more detail.         Relevant Medications    amphetamine-dextroamphetamine (ADDERALL, 30MG,) 30 MG tablet    amphetamine-dextroamphetamine (ADDERALL, 30MG,) 30 MG tablet (Start on 9/24/2021)    amphetamine-dextroamphetamine (ADDERALL, 30MG,) 30 MG tablet (Start on 10/24/2021)    Essential tremor     Chronic ongoing problem.  Continue propranolol 10 mg daily.  When she has a take it increased frequency tends to cause side effects.  Tremor is very manageable at this point.         Relevant Medications    propranolol (INDERAL) 10 MG Tab    Vitamin D deficiency     Chronic and decompensated problem.  Advised her to initiate vitamin D 2000 international units daily.         Mixed hyperlipidemia     Chronic and decompensated problem.  She is hesitant to go on cholesterol medicine.  Will obtain cardiac scoring to better with stratify whether she has clinical atherosclerosis.  She is agreeable.  She meets criteria to start statin therapy.         Relevant Medications    propranolol (INDERAL) 10 MG Tab    Urgency incontinence     New and decompensated problem.  Likely longstanding but has never been addressed medically.  Will send in Myrbetriq 25 mg daily for her to try.  Advised her to monitor for elevated heart rate, blood pressure, or worsening of headaches.         Relevant Medications    Mirabegron ER 25 MG TABLET SR 24 HR    Chest pain     New and decompensated problem.  Will obtain stress echocardiogram to have a baseline.  Her  description sounds more like noncardiac chest pain but due to her family history of MI in her mother, untreated hyperlipidemia, and no prior evaluation I think would be prudent to establish a benchmark.  She is agreeable.         Relevant Orders    EC-ECHOCARDIOGRAM REST/STRESS W/O CONT      Other Visit Diagnoses     Other specified personal risk factors, not elsewhere classified        Relevant Orders    CT-CARDIAC SCORING           RTC: Return in about 3 months (around 11/25/2021).    I spent a total of 42 minutes with record review, exam, communication with the patient, communication with other providers, and documentation of this encounter.    PLEASE NOTE: This dictation was created using voice recognition software. I have made every reasonable attempt to correct obvious errors, but I expect that there are errors of grammar and possibly content that I did not discover before finalizing the note.      Farhana Quintanilla, DO  Geriatric and Internal Medicine  [facility]

## 2021-08-25 NOTE — ASSESSMENT & PLAN NOTE
New and decompensated problem.  Will obtain stress echocardiogram to have a baseline.  Her description sounds more like noncardiac chest pain but due to her family history of MI in her mother, untreated hyperlipidemia, and no prior evaluation I think would be prudent to establish a benchmark.  She is agreeable.

## 2021-08-25 NOTE — ASSESSMENT & PLAN NOTE
Chronic and decompensated problem.  She is hesitant to go on cholesterol medicine.  Will obtain cardiac scoring to better with stratify whether she has clinical atherosclerosis.  She is agreeable.  She meets criteria to start statin therapy.

## 2021-08-25 NOTE — ASSESSMENT & PLAN NOTE
Chronic and decompensated problem.  Advised her to initiate vitamin D 2000 international units daily.

## 2021-08-25 NOTE — ASSESSMENT & PLAN NOTE
Chronic and ongoing problem.  She did eventually make an appointment with psychiatry to establish care.  I have no past medical records from her last doctor in [place] documenting the initiation of this medicine and what testing was done.  She would really like to stay on the medicine.  I want to make sure that psychiatry agrees that this is a safe medicine for her to use moving forward.  Also, she has been under increased stress with her partner who had a hemorrhagic stroke last year and then recently had a right knee replacement and I think it would be good to have someone talk to her about her mental health in more detail.

## 2021-08-25 NOTE — ASSESSMENT & PLAN NOTE
Chronic ongoing problem.  Continue propranolol 10 mg daily.  When she has a take it increased frequency tends to cause side effects.  Tremor is very manageable at this point.

## 2021-10-05 ENCOUNTER — APPOINTMENT (OUTPATIENT)
Dept: RADIOLOGY | Facility: MEDICAL CENTER | Age: 76
End: 2021-10-05
Attending: INTERNAL MEDICINE
Payer: COMMERCIAL

## 2021-10-12 ENCOUNTER — APPOINTMENT (OUTPATIENT)
Dept: MEDICAL GROUP | Facility: PHYSICIAN GROUP | Age: 76
End: 2021-10-12
Payer: MEDICARE

## 2021-10-14 ENCOUNTER — APPOINTMENT (OUTPATIENT)
Dept: RADIOLOGY | Facility: MEDICAL CENTER | Age: 76
End: 2021-10-14
Attending: INTERNAL MEDICINE
Payer: MEDICARE

## 2021-10-14 DIAGNOSIS — N39.41 URGENCY INCONTINENCE: ICD-10-CM

## 2021-10-14 RX ORDER — MIRABEGRON 25 MG/1
TABLET, FILM COATED, EXTENDED RELEASE ORAL
Qty: 100 TABLET | Refills: 3 | Status: SHIPPED | OUTPATIENT
Start: 2021-10-14 | End: 2022-08-27

## 2021-10-14 NOTE — TELEPHONE ENCOUNTER
Received request via: Pharmacy    Was the patient seen in the last year in this department? Yes 8/25/21    Does the patient have an active prescription (recently filled or refills available) for medication(s) requested? No

## 2021-10-20 ENCOUNTER — APPOINTMENT (OUTPATIENT)
Dept: MEDICAL GROUP | Facility: PHYSICIAN GROUP | Age: 76
End: 2021-10-20
Payer: MEDICARE

## 2021-10-26 ENCOUNTER — HOSPITAL ENCOUNTER (OUTPATIENT)
Dept: RADIOLOGY | Facility: MEDICAL CENTER | Age: 76
End: 2021-10-26
Attending: INTERNAL MEDICINE
Payer: COMMERCIAL

## 2021-10-26 DIAGNOSIS — Z91.89 OTHER SPECIFIED PERSONAL RISK FACTORS, NOT ELSEWHERE CLASSIFIED: ICD-10-CM

## 2021-10-26 PROCEDURE — 4410556 CT-CARDIAC SCORING (SELF PAY ONLY)

## 2021-11-09 ENCOUNTER — HOSPITAL ENCOUNTER (OUTPATIENT)
Dept: CARDIOLOGY | Facility: MEDICAL CENTER | Age: 76
End: 2021-11-09
Attending: INTERNAL MEDICINE
Payer: MEDICARE

## 2021-11-09 DIAGNOSIS — R07.9 CHEST PAIN, UNSPECIFIED TYPE: ICD-10-CM

## 2021-11-09 LAB — LV EJECT FRACT  99904: 65

## 2021-11-09 PROCEDURE — 93350 STRESS TTE ONLY: CPT | Mod: 26 | Performed by: INTERNAL MEDICINE

## 2021-11-09 PROCEDURE — 93017 CV STRESS TEST TRACING ONLY: CPT

## 2021-11-09 PROCEDURE — 93018 CV STRESS TEST I&R ONLY: CPT | Performed by: INTERNAL MEDICINE

## 2021-11-16 ENCOUNTER — OFFICE VISIT (OUTPATIENT)
Dept: MEDICAL GROUP | Facility: PHYSICIAN GROUP | Age: 76
End: 2021-11-16
Payer: MEDICARE

## 2021-11-16 VITALS
OXYGEN SATURATION: 95 % | WEIGHT: 158.1 LBS | HEART RATE: 78 BPM | HEIGHT: 61 IN | TEMPERATURE: 96.8 F | BODY MASS INDEX: 29.85 KG/M2 | RESPIRATION RATE: 12 BRPM | DIASTOLIC BLOOD PRESSURE: 62 MMHG | SYSTOLIC BLOOD PRESSURE: 144 MMHG

## 2021-11-16 DIAGNOSIS — Z23 NEED FOR SHINGLES VACCINE: ICD-10-CM

## 2021-11-16 DIAGNOSIS — E78.2 MIXED HYPERLIPIDEMIA: ICD-10-CM

## 2021-11-16 DIAGNOSIS — F90.0 ATTENTION DEFICIT HYPERACTIVITY DISORDER (ADHD), PREDOMINANTLY INATTENTIVE TYPE: ICD-10-CM

## 2021-11-16 DIAGNOSIS — R06.2 WHEEZING: ICD-10-CM

## 2021-11-16 DIAGNOSIS — L98.9 SKIN LESION: ICD-10-CM

## 2021-11-16 DIAGNOSIS — R07.9 CHEST PAIN, UNSPECIFIED TYPE: ICD-10-CM

## 2021-11-16 PROCEDURE — 90471 IMMUNIZATION ADMIN: CPT | Performed by: INTERNAL MEDICINE

## 2021-11-16 PROCEDURE — 99214 OFFICE O/P EST MOD 30 MIN: CPT | Mod: 25 | Performed by: INTERNAL MEDICINE

## 2021-11-16 PROCEDURE — 90750 HZV VACC RECOMBINANT IM: CPT | Performed by: INTERNAL MEDICINE

## 2021-11-16 RX ORDER — DEXTROAMPHETAMINE SACCHARATE, AMPHETAMINE ASPARTATE, DEXTROAMPHETAMINE SULFATE AND AMPHETAMINE SULFATE 7.5; 7.5; 7.5; 7.5 MG/1; MG/1; MG/1; MG/1
30 TABLET ORAL 2 TIMES DAILY
Qty: 60 TABLET | Refills: 0 | Status: SHIPPED | OUTPATIENT
Start: 2021-11-23 | End: 2021-12-23

## 2021-11-16 RX ORDER — ZINC SULFATE 50(220)MG
220 CAPSULE ORAL DAILY
COMMUNITY
End: 2022-08-27

## 2021-11-16 RX ORDER — DEXTROAMPHETAMINE SACCHARATE, AMPHETAMINE ASPARTATE, DEXTROAMPHETAMINE SULFATE AND AMPHETAMINE SULFATE 7.5; 7.5; 7.5; 7.5 MG/1; MG/1; MG/1; MG/1
30 TABLET ORAL 2 TIMES DAILY
Qty: 60 TABLET | Refills: 0 | Status: SHIPPED | OUTPATIENT
Start: 2021-12-23 | End: 2022-01-22

## 2021-11-16 RX ORDER — DEXTROAMPHETAMINE SACCHARATE, AMPHETAMINE ASPARTATE, DEXTROAMPHETAMINE SULFATE AND AMPHETAMINE SULFATE 7.5; 7.5; 7.5; 7.5 MG/1; MG/1; MG/1; MG/1
30 TABLET ORAL 2 TIMES DAILY
Qty: 60 TABLET | Refills: 0 | Status: SHIPPED | OUTPATIENT
Start: 2022-01-22 | End: 2022-02-21

## 2021-11-16 RX ORDER — VITAMIN B COMPLEX
2000 TABLET ORAL DAILY
COMMUNITY
End: 2022-08-27

## 2021-11-16 RX ORDER — ASPIRIN 81 MG/1
81 TABLET, CHEWABLE ORAL DAILY
COMMUNITY
End: 2022-08-27

## 2021-11-16 RX ORDER — ALBUTEROL SULFATE 90 UG/1
2 AEROSOL, METERED RESPIRATORY (INHALATION) EVERY 6 HOURS PRN
Qty: 8.5 G | Refills: 3 | Status: SHIPPED | OUTPATIENT
Start: 2021-11-16 | End: 2022-08-27

## 2021-11-16 ASSESSMENT — FIBROSIS 4 INDEX: FIB4 SCORE: 1.97

## 2021-11-16 NOTE — ASSESSMENT & PLAN NOTE
New problem, will refer her to dermatology to establish care for skin check and to remove the irritating mole/lesion on her left anterolateral neck.

## 2021-11-16 NOTE — PROGRESS NOTES
Subjective:   Chief Complaint/History of Present Illness:  Cherelle Crystal is a 76 y.o. female established patient who presents today to discuss medical problems as listed below. Cherelle is unaccompanied for today's visit.    Problem   Wheezing    She has wheezing and burning chest pain, she previously used albuterol. Last PFTs completed remotely. No nocturnal symptoms at this time. She notes weight gain and deconditioning.     Skin Lesion    She has a raised skin lesion on the left anterolateral neck. She feels this has grown in size recently. It catches on her necklace and becomes irritated and inflamed. She would like it removed.     Chest Pain    Stress echo (11/2021):   Negative stress echocardiogram for ischemia with adequate stress achieved by heart rate criteria.   Poor exercise tolerance which reduces the sensitivity of this test for detection of ischemia.      She describes an ache or discomfort in the left chest that radiates to her left shoulder and arm.  It is not exertional at this time.  No prior occurrence.  No perceived cause of injury.  She thinks her mother had an MI at the end of her life related to an accident.  No prior cardiac evaluation.  She has elevated cholesterol but declines using medication.  No associated diaphoresis, nausea, or dyspnea on exertion.    Reassuring stress test as above. Feels chest pain may be burning related to a lung problem, previous history of pleurisy requiring use of albuterol.     Mixed Hyperlipidemia       Ref. Range 9/4/2012 12:15   Cholesterol,Tot Latest Ref Range: 100 - 199 mg/dL 223 (H)   Triglycerides Latest Ref Range: 0 - 149 mg/dL 64   HDL Latest Ref Range: >=40 mg/dL 76   LDL Latest Ref Range: <100 mg/dL 134     The 10-year ASCVD risk score ([place] ROBERTO Jr., et al., 2013) is: 15.5%    CT cardiac score (10/2021):  Coronary calcification:  LMA - 47.4  LCX - 0.0  LAD - 0.0  RCA - 0.0  PDA - 0.0     Total Calcium Score: 47.4     Percentile: Calcium score is above  the 25th percentile for the patient's age and sex.     IMPRESSION: Calcium Score of 1-99 AND <75th percentile: The severity of disease is more significant as all of the calcification is in the left main coronary artery.    She has a past history of elevated cholesterol, has not taken medication. She thinks her mother  from an MI but it was related to an accident. She declines statin but is open to low dose aspirin.     Attention Deficit Hyperactivity Disorder (Adhd), Predominantly Inattentive Type    She reports diagnosis of ADHD at age 41. This was made by her primary care physician, she does not ever recall seeing a psychiatrist. She had challenges with attention related to her job. She was started on Adderall which was increased to 30 mg within the first few months. She denies any side effects from the medicine. 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    Current regimen: adderall 30 mg twice daily          Current Medications:  Current Outpatient Medications Ordered in Epic   Medication Sig Dispense Refill   • zinc sulfate (ZINCATE) 220 (50 Zn) MG Cap Take 220 mg by mouth every day.     • vitamin D3 (CHOLECALCIFEROL) 1000 Unit (25 mcg) Tab Take 2,000 Units by mouth every day.     • albuterol 108 (90 Base) MCG/ACT Aero Soln inhalation aerosol Inhale 2 Puffs every 6 hours as needed for Shortness of Breath. 8.5 g 3   • [START ON 2021] amphetamine-dextroamphetamine (ADDERALL, 30MG,) 30 MG tablet Take 1 Tablet by mouth 2 times a day for 30 days. 60 Tablet 0   • [START ON 2021] amphetamine-dextroamphetamine (ADDERALL, 30MG,) 30 MG tablet Take 1 Tablet by mouth 2 times a day for 30 days. 60 Tablet 0   • [START ON 2022] amphetamine-dextroamphetamine (ADDERALL, 30MG,) 30 MG tablet Take 1 Tablet by mouth 2 times a day for 30 days. 60 Tablet 0   • aspirin (ASA) 81 MG Chew Tab chewable tablet Chew 81 mg every day.     • MYRBETRIQ 25 MG TABLET SR 24 HR TAKE 1 TABLET BY MOUTH  "EVERY  Tablet 3   • propranolol (INDERAL) 10 MG Tab Take 1 Tablet by mouth every day. 100 Tablet 3   • cyanocobalamin (VITAMIN B-12) 500 MCG Tab Take 500 mcg by mouth every day.     • Glucosamine-Chondroitin (GLUCOSAMINE CHONDR COMPLEX PO) Take  by mouth.     • Ascorbic Acid (VITAMIN C) 1000 MG Tab Take  by mouth.     • ibuprofen (MOTRIN) 800 MG Tab Take 1 Tab by mouth every 8 hours as needed. 30 Tab 0     No current Epic-ordered facility-administered medications on file.          Objective:   Physical Exam:    Vitals: /62 (BP Location: Left arm, Patient Position: Sitting, BP Cuff Size: Adult)   Pulse 78   Temp 36 °C (96.8 °F)   Resp 12   Ht 1.549 m (5' 1\")   Wt 71.7 kg (158 lb 1.6 oz)   SpO2 95%    BMI: Body mass index is 29.87 kg/m².  Physical Exam  Constitutional:       General: She is not in acute distress.     Appearance: Normal appearance. She is not ill-appearing.   Eyes:      General: No scleral icterus.     Conjunctiva/sclera: Conjunctivae normal.   Cardiovascular:      Rate and Rhythm: Normal rate and regular rhythm.      Pulses: Normal pulses.   Pulmonary:      Effort: Pulmonary effort is normal. No respiratory distress.      Breath sounds: Normal breath sounds. No wheezing or rhonchi.   Musculoskeletal:      Right lower leg: No edema.      Left lower leg: No edema.   Skin:     Findings: Lesion present. No rash.      Comments: Raised brown mole/lesion on left anterolateral neck. SK on back of right upper brachium.   Psychiatric:         Mood and Affect: Mood normal.         Behavior: Behavior normal.         Thought Content: Thought content normal.         Judgment: Judgment normal.          Assessment and Plan:   Cherelle is a 76 y.o. female with the following:  Problem List Items Addressed This Visit     Attention deficit hyperactivity disorder (ADHD), predominantly inattentive type     Chronic and ongoing problem, continue Adderall 30 mg twice daily for ADHD.    Obtained and reviewed " "patient utilization report from [facility] pharmacy database on 11/16/2021 9:33 AM  prior to writing prescription for controlled substance II, III or IV per [place] bill . Based on assessment of the report, the prescription is medically necessary.     Patient understands this prescription is a controlled substance which is potentially habit-forming and its use is regulated by the NEIL. We also discussed the new \"black box\" warning regarding the lethal combination of opioids and benzodiazepines. Refills are subject to terms of a controlled substance agreement and patient has an updated one on file. Most recent UDS is appropriate. Any refill requires an office visit. Narcotics have may adverse effects and the risks of addiction, accidental overdose and death were emphasized. Provided prescriptions for the next three months.         Relevant Medications    amphetamine-dextroamphetamine (ADDERALL, 30MG,) 30 MG tablet (Start on 11/23/2021)    amphetamine-dextroamphetamine (ADDERALL, 30MG,) 30 MG tablet (Start on 12/23/2021)    amphetamine-dextroamphetamine (ADDERALL, 30MG,) 30 MG tablet (Start on 1/22/2022)    Mixed hyperlipidemia     Chronic and ongoing problem, CT cardiac score shows mild plaque in LMA. She declines statin, agreeable to adding aspirin 81 mg daily. Stress test completed and reassuring.         Chest pain     She continues to note burning chest pain, now seems more related to exertion with associated dyspnea/wheezing. History of pleurisy and lung infection. She is interested in trialing albuterol and using OTC analgesics. Stress test reassuring as above in HPI, no ischemia seen. She is agreeable to adding aspirin 81 mg daily due to mild plaque in LMA.         Wheezing     New problem, trial albuterol before exercise to see if that helps with wheezing and burning chest discomfort. May need updated PFTs and maintenance therapy if symptoms do not improve.         Relevant Medications    albuterol 108 " (90 Base) MCG/ACT Aero Soln inhalation aerosol    Skin lesion     New problem, will refer her to dermatology to establish care for skin check and to remove the irritating mole/lesion on her left anterolateral neck.         Relevant Orders    Referral to Dermatology      Other Visit Diagnoses     Need for shingles vaccine        Relevant Orders    Shingles Vaccine (Shingrix) (Completed)           RTC: Return in about 3 months (around 2/16/2022).    I spent a total of 38 minutes with record review, exam, communication with the patient, communication with other providers, and documentation of this encounter.    PLEASE NOTE: This dictation was created using voice recognition software. I have made every reasonable attempt to correct obvious errors, but I expect that there are errors of grammar and possibly content that I did not discover before finalizing the note.      Farhana Quintanilla, DO  Geriatric and Internal Medicine  [facility]

## 2021-11-16 NOTE — ASSESSMENT & PLAN NOTE
New problem, trial albuterol before exercise to see if that helps with wheezing and burning chest discomfort. May need updated PFTs and maintenance therapy if symptoms do not improve.

## 2021-11-16 NOTE — ASSESSMENT & PLAN NOTE
She continues to note burning chest pain, now seems more related to exertion with associated dyspnea/wheezing. History of pleurisy and lung infection. She is interested in trialing albuterol and using OTC analgesics. Stress test reassuring as above in HPI, no ischemia seen. She is agreeable to adding aspirin 81 mg daily due to mild plaque in LMA.

## 2021-11-16 NOTE — ASSESSMENT & PLAN NOTE
Chronic and ongoing problem, CT cardiac score shows mild plaque in LMA. She declines statin, agreeable to adding aspirin 81 mg daily. Stress test completed and reassuring.

## 2021-11-16 NOTE — ASSESSMENT & PLAN NOTE
"Chronic and ongoing problem, continue Adderall 30 mg twice daily for ADHD.    Obtained and reviewed patient utilization report from [facility] pharmacy database on 11/16/2021 9:33 AM  prior to writing prescription for controlled substance II, III or IV per [place] bill . Based on assessment of the report, the prescription is medically necessary.     Patient understands this prescription is a controlled substance which is potentially habit-forming and its use is regulated by the NEIL. We also discussed the new \"black box\" warning regarding the lethal combination of opioids and benzodiazepines. Refills are subject to terms of a controlled substance agreement and patient has an updated one on file. Most recent UDS is appropriate. Any refill requires an office visit. Narcotics have may adverse effects and the risks of addiction, accidental overdose and death were emphasized. Provided prescriptions for the next three months.  "

## 2022-02-22 ENCOUNTER — OFFICE VISIT (OUTPATIENT)
Dept: MEDICAL GROUP | Facility: PHYSICIAN GROUP | Age: 77
End: 2022-02-22
Payer: MEDICARE

## 2022-02-22 VITALS
BODY MASS INDEX: 29.36 KG/M2 | TEMPERATURE: 97.8 F | RESPIRATION RATE: 12 BRPM | OXYGEN SATURATION: 96 % | WEIGHT: 155.5 LBS | DIASTOLIC BLOOD PRESSURE: 70 MMHG | HEIGHT: 61 IN | HEART RATE: 73 BPM | SYSTOLIC BLOOD PRESSURE: 160 MMHG

## 2022-02-22 DIAGNOSIS — E78.2 MIXED HYPERLIPIDEMIA: ICD-10-CM

## 2022-02-22 DIAGNOSIS — Z23 NEED FOR VACCINATION: ICD-10-CM

## 2022-02-22 DIAGNOSIS — G25.0 ESSENTIAL TREMOR: ICD-10-CM

## 2022-02-22 DIAGNOSIS — F90.0 ATTENTION DEFICIT HYPERACTIVITY DISORDER (ADHD), PREDOMINANTLY INATTENTIVE TYPE: ICD-10-CM

## 2022-02-22 DIAGNOSIS — N39.41 URGENCY INCONTINENCE: ICD-10-CM

## 2022-02-22 PROCEDURE — G0008 ADMIN INFLUENZA VIRUS VAC: HCPCS | Performed by: INTERNAL MEDICINE

## 2022-02-22 PROCEDURE — 90662 IIV NO PRSV INCREASED AG IM: CPT | Performed by: INTERNAL MEDICINE

## 2022-02-22 PROCEDURE — 99214 OFFICE O/P EST MOD 30 MIN: CPT | Mod: 25 | Performed by: INTERNAL MEDICINE

## 2022-02-22 RX ORDER — DEXTROAMPHETAMINE SACCHARATE, AMPHETAMINE ASPARTATE, DEXTROAMPHETAMINE SULFATE AND AMPHETAMINE SULFATE 7.5; 7.5; 7.5; 7.5 MG/1; MG/1; MG/1; MG/1
30 TABLET ORAL 2 TIMES DAILY
Qty: 60 TABLET | Refills: 0 | Status: SHIPPED | OUTPATIENT
Start: 2022-02-22 | End: 2022-03-24

## 2022-02-22 ASSESSMENT — FIBROSIS 4 INDEX: FIB4 SCORE: 1.97

## 2022-02-22 ASSESSMENT — PATIENT HEALTH QUESTIONNAIRE - PHQ9: CLINICAL INTERPRETATION OF PHQ2 SCORE: 0

## 2022-02-22 NOTE — PROGRESS NOTES
Subjective:   Chief Complaint/History of Present Illness:  Cherelle Crystal is a 76 y.o. female established patient who presents today to discuss medical problems as listed below. Cherelle is unaccompanied for today's visit.    Problem   Urgency Incontinence    She reports urge incontinence.  She does not have enough time to get to the bathroom and has to wear a pad due to urinary leakage.  She is never tried any medicines for this.  It has been insidious onset.  No history of recurrent urinary tract infections.  We started Myrbetriq 25 mg daily she notes approximately 90% improvement with this medicine, no noted side effects.    Current regimen: Mirabegron 25 mg daily     Essential Tremor    She reports essential tremor noticeable mostly in the left hand 2nd digit but it can also involve both hands. It has slightly progressed over the past year. She was given propranolol 10 mg initially twice daily but this caused fatigue so now she takes it once daily and notes it provides improvement for about 1/2 the day.     Mixed Hyperlipidemia       Ref. Range 2021 14:35   Cholesterol,Tot Latest Ref Range: 100 - 199 mg/dL 244 (H)   Triglycerides Latest Ref Range: 0 - 149 mg/dL 151 (H)   HDL Latest Ref Range: >=40 mg/dL 60   LDL Latest Ref Range: <100 mg/dL 154 (H)     The 10-year ASCVD risk score (Dennis [place] DC Jr., et al., 2013) is: 24.1%    CT cardiac score (10/2021):  Coronary calcification:  LMA - 47.4  LCX - 0.0  LAD - 0.0  RCA - 0.0  PDA - 0.0     Total Calcium Score: 47.4     Percentile: Calcium score is above the 25th percentile for the patient's age and sex.     IMPRESSION: Calcium Score of 1-99 AND <75th percentile: The severity of disease is more significant as all of the calcification is in the left main coronary artery.    She has a past history of elevated cholesterol, has not taken medication. She thinks her mother  from an MI but it was related to an accident. She declines statin but is open to low dose  aspirin.     Attention Deficit Hyperactivity Disorder (Adhd), Predominantly Inattentive Type    She reports diagnosis of ADHD at age 41. This was made by her primary care physician, she does not ever recall seeing a psychiatrist. She had challenges with attention related to her job. She was started on Adderall which was increased to 30 mg within the first few months. She denies any side effects from the medicine. 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  I was not able to get any past medical records, I would like her to meet with a psychiatrist at least once to ensure we have the correct diagnosis and that the dosage and frequency are appropriate as this is not a medicine I normally prescribed.    Current regimen: adderall 30 mg twice daily          Current Medications:  Current Outpatient Medications Ordered in Epic   Medication Sig Dispense Refill   • amphetamine-dextroamphetamine (ADDERALL) 30 MG tablet Take 1 Tablet by mouth 2 times a day for 30 days. 60 Tablet 0   • zinc sulfate (ZINCATE) 220 (50 Zn) MG Cap Take 220 mg by mouth every day.     • vitamin D3 (CHOLECALCIFEROL) 1000 Unit (25 mcg) Tab Take 2,000 Units by mouth every day.     • albuterol 108 (90 Base) MCG/ACT Aero Soln inhalation aerosol Inhale 2 Puffs every 6 hours as needed for Shortness of Breath. 8.5 g 3   • aspirin (ASA) 81 MG Chew Tab chewable tablet Chew 81 mg every day.     • MYRBETRIQ 25 MG TABLET SR 24 HR TAKE 1 TABLET BY MOUTH EVERY  Tablet 3   • propranolol (INDERAL) 10 MG Tab Take 1 Tablet by mouth every day. 100 Tablet 3   • cyanocobalamin (VITAMIN B-12) 500 MCG Tab Take 500 mcg by mouth every day.     • Glucosamine-Chondroitin (GLUCOSAMINE CHONDR COMPLEX PO) Take  by mouth.     • Ascorbic Acid (VITAMIN C) 1000 MG Tab Take  by mouth.     • ibuprofen (MOTRIN) 800 MG Tab Take 1 Tab by mouth every 8 hours as needed. 30 Tab 0     No current Epic-ordered facility-administered medications on file.           "Objective:   Physical Exam:    Vitals: /70 (BP Location: Left arm, Patient Position: Sitting, BP Cuff Size: Adult)   Pulse 73   Temp 36.6 °C (97.8 °F) (Temporal)   Resp 12   Ht 1.549 m (5' 1\")   Wt 70.5 kg (155 lb 8 oz)   SpO2 96%    BMI: Body mass index is 29.38 kg/m².  Physical Exam  Constitutional:       General: She is not in acute distress.     Appearance: Normal appearance. She is normal weight. She is not ill-appearing.   HENT:      Right Ear: Tympanic membrane and ear canal normal. There is no impacted cerumen.      Left Ear: Tympanic membrane and ear canal normal. There is no impacted cerumen.   Eyes:      Conjunctiva/sclera: Conjunctivae normal.   Cardiovascular:      Rate and Rhythm: Normal rate and regular rhythm.      Pulses: Normal pulses.      Heart sounds: No murmur heard.  Pulmonary:      Effort: Pulmonary effort is normal. No respiratory distress.      Breath sounds: Normal breath sounds. No wheezing or rhonchi.   Abdominal:      General: Bowel sounds are normal.      Palpations: Abdomen is soft.      Tenderness: There is no abdominal tenderness.   Musculoskeletal:      Right lower leg: No edema.      Left lower leg: No edema.   Skin:     General: Skin is warm and dry.      Findings: No bruising or rash.   Psychiatric:         Mood and Affect: Mood normal.         Behavior: Behavior normal.         Thought Content: Thought content normal.         Judgment: Judgment normal.         Assessment and Plan:   Cherelle is a 76 y.o. female with the following:  Problem List Items Addressed This Visit     Attention deficit hyperactivity disorder (ADHD), predominantly inattentive type     Chronic and ongoing problem.  She unfortunately had to reschedule her psychiatry appointment due to there in availability.  I encouraged her to reschedule this to double check on the appropriateness and accuracy of her diagnosis as well as Adderall medicine including the dosage and frequency.  I am unable to get her " "medical records from [place] where she apparently had some of this testing done in the past.  If psychiatry agrees that this continues to be appropriate and I can continue the refills for her.  We will give her 1 month supply in the meantime while she is making follow-up appointment with psychiatry.    Obtained and reviewed patient utilization report from [facility] pharmacy database on 2/22/2022 4:27 PM  prior to writing prescription for controlled substance II, III or IV per [place] bill . Based on assessment of the report, the prescription is medically necessary.     Patient understands this prescription is a controlled substance which is potentially habit-forming and its use is regulated by the NEIL. We also discussed the new \"black box\" warning regarding the lethal combination of opioids and benzodiazepines. Refills are subject to terms of a controlled substance agreement and patient has an updated one on file. Most recent UDS is appropriate. Any refill requires an office visit. Narcotics have may adverse effects and the risks of addiction, accidental overdose and death were emphasized. Provided prescriptions for the next one month.         Relevant Medications    amphetamine-dextroamphetamine (ADDERALL) 30 MG tablet    Other Relevant Orders    Controlled Substance Treatment Agreement    Essential tremor     Chronic and ongoing problem.  Continues to tolerate once daily dosing of propranolol.  Not interested in trying other medicines at this time.  Notes that she is still functional with her tremor but it is embarrassing and makes it all bit more difficult to do fine motor activities.         Mixed hyperlipidemia     Chronic and ongoing problem.  She declines statin therapy, she has mild plaque in the LMA on CT cardiac score from 7/2021 and has agreed to take aspirin and continue at this time with low-dose aspirin 81 mg daily.         Relevant Orders    CBC WITH DIFFERENTIAL    Comp Metabolic Panel    Lipid " Profile    VITAMIN D,25 HYDROXY    VITAMIN B12    TSH WITH REFLEX TO FT4    Urgency incontinence     Chronic and improved problem, incontinence is about 90% improved with use of mirabegron 25 mg daily, continue at this time.           Other Visit Diagnoses     Need for vaccination        Relevant Orders    Influenza Vaccine, High Dose (65+ Only) (Completed)           Annual Health Assessment Questions:    1.  Are you currently engaging in any exercise or physical activity? Yes  Walking   2.  How would you describe your mood or emotional well-being today? a litle stressed  TARSHA   Ed still has bloody nose    3.  Have you had any falls in the last year? No    4.  Have you noticed any problems with your balance or had difficulty walking? Yes  A couple of days ago felt very light headed for a half a day     5.  In the last six months have you experienced any leakage of urine? Yes    6. DPA/Advanced Directive: Patient does not have an Advanced Directive.  A packet and workshop information was given on Advanced Directives.       RTC: Return in about 3 months (around 5/22/2022).    I spent a total of 34 minutes with record review, exam, communication with the patient, communication with other providers, and documentation of this encounter.    PLEASE NOTE: This dictation was created using voice recognition software. I have made every reasonable attempt to correct obvious errors, but I expect that there are errors of grammar and possibly content that I did not discover before finalizing the note.      Farhana Quintanilla, DO  Geriatric and Internal Medicine  [facility]

## 2022-02-23 NOTE — ASSESSMENT & PLAN NOTE
"Chronic and ongoing problem.  She unfortunately had to reschedule her psychiatry appointment due to there in availability.  I encouraged her to reschedule this to double check on the appropriateness and accuracy of her diagnosis as well as Adderall medicine including the dosage and frequency.  I am unable to get her medical records from [place] where she apparently had some of this testing done in the past.  If psychiatry agrees that this continues to be appropriate and I can continue the refills for her.  We will give her 1 month supply in the meantime while she is making follow-up appointment with psychiatry.    Obtained and reviewed patient utilization report from [facility] pharmacy database on 2/22/2022 4:27 PM  prior to writing prescription for controlled substance II, III or IV per [place] bill . Based on assessment of the report, the prescription is medically necessary.     Patient understands this prescription is a controlled substance which is potentially habit-forming and its use is regulated by the NEIL. We also discussed the new \"black box\" warning regarding the lethal combination of opioids and benzodiazepines. Refills are subject to terms of a controlled substance agreement and patient has an updated one on file. Most recent UDS is appropriate. Any refill requires an office visit. Narcotics have may adverse effects and the risks of addiction, accidental overdose and death were emphasized. Provided prescriptions for the next one month.  "

## 2022-02-23 NOTE — ASSESSMENT & PLAN NOTE
Chronic and improved problem, incontinence is about 90% improved with use of mirabegron 25 mg daily, continue at this time.

## 2022-02-23 NOTE — ASSESSMENT & PLAN NOTE
Chronic and ongoing problem.  She declines statin therapy, she has mild plaque in the LMA on CT cardiac score from 7/2021 and has agreed to take aspirin and continue at this time with low-dose aspirin 81 mg daily.

## 2022-02-23 NOTE — ASSESSMENT & PLAN NOTE
Chronic and ongoing problem.  Continues to tolerate once daily dosing of propranolol.  Not interested in trying other medicines at this time.  Notes that she is still functional with her tremor but it is embarrassing and makes it all bit more difficult to do fine motor activities.

## 2022-04-01 ENCOUNTER — PATIENT MESSAGE (OUTPATIENT)
Dept: HEALTH INFORMATION MANAGEMENT | Facility: OTHER | Age: 77
End: 2022-04-01

## 2022-04-15 ENCOUNTER — TELEMEDICINE (OUTPATIENT)
Dept: BEHAVIORAL HEALTH | Facility: CLINIC | Age: 77
End: 2022-04-15
Payer: MEDICARE

## 2022-04-15 DIAGNOSIS — F90.0 ADHD (ATTENTION DEFICIT HYPERACTIVITY DISORDER), INATTENTIVE TYPE: ICD-10-CM

## 2022-04-15 PROCEDURE — 99205 OFFICE O/P NEW HI 60 MIN: CPT | Mod: 95 | Performed by: PSYCHIATRY & NEUROLOGY
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
## 2022-05-23 ENCOUNTER — OFFICE VISIT (OUTPATIENT)
Dept: MEDICAL GROUP | Facility: PHYSICIAN GROUP | Age: 77
End: 2022-05-23
Payer: MEDICARE

## 2022-05-23 VITALS
DIASTOLIC BLOOD PRESSURE: 75 MMHG | BODY MASS INDEX: 29.07 KG/M2 | TEMPERATURE: 97.2 F | RESPIRATION RATE: 12 BRPM | HEART RATE: 74 BPM | HEIGHT: 61 IN | OXYGEN SATURATION: 97 % | SYSTOLIC BLOOD PRESSURE: 155 MMHG | WEIGHT: 154 LBS

## 2022-05-23 DIAGNOSIS — N39.41 URGENCY INCONTINENCE: ICD-10-CM

## 2022-05-23 DIAGNOSIS — Z23 NEED FOR PNEUMOCOCCAL VACCINE: ICD-10-CM

## 2022-05-23 DIAGNOSIS — F90.0 ATTENTION DEFICIT HYPERACTIVITY DISORDER (ADHD), PREDOMINANTLY INATTENTIVE TYPE: ICD-10-CM

## 2022-05-23 DIAGNOSIS — Z63.6 CAREGIVER STRESS: ICD-10-CM

## 2022-05-23 DIAGNOSIS — G25.0 ESSENTIAL TREMOR: ICD-10-CM

## 2022-05-23 DIAGNOSIS — I10 PRIMARY HYPERTENSION: ICD-10-CM

## 2022-05-23 PROCEDURE — 90471 IMMUNIZATION ADMIN: CPT | Performed by: INTERNAL MEDICINE

## 2022-05-23 PROCEDURE — 99215 OFFICE O/P EST HI 40 MIN: CPT | Mod: 25 | Performed by: INTERNAL MEDICINE

## 2022-05-23 PROCEDURE — 90677 PCV20 VACCINE IM: CPT | Performed by: INTERNAL MEDICINE

## 2022-05-23 RX ORDER — DEXTROAMPHETAMINE SACCHARATE, AMPHETAMINE ASPARTATE, DEXTROAMPHETAMINE SULFATE AND AMPHETAMINE SULFATE 3.75; 3.75; 3.75; 3.75 MG/1; MG/1; MG/1; MG/1
15 TABLET ORAL 2 TIMES DAILY
Qty: 60 TABLET | Refills: 0 | Status: SHIPPED | OUTPATIENT
Start: 2022-06-22 | End: 2022-07-22

## 2022-05-23 RX ORDER — DEXTROAMPHETAMINE SACCHARATE, AMPHETAMINE ASPARTATE, DEXTROAMPHETAMINE SULFATE AND AMPHETAMINE SULFATE 3.75; 3.75; 3.75; 3.75 MG/1; MG/1; MG/1; MG/1
15 TABLET ORAL 2 TIMES DAILY
Qty: 60 TABLET | Refills: 0 | Status: SHIPPED | OUTPATIENT
Start: 2022-07-22 | End: 2022-08-21

## 2022-05-23 RX ORDER — PROPRANOLOL HYDROCHLORIDE 10 MG/1
10 TABLET ORAL 2 TIMES DAILY
Qty: 200 TABLET | Refills: 3 | Status: SHIPPED | OUTPATIENT
Start: 2022-05-23 | End: 2022-07-07

## 2022-05-23 RX ORDER — DEXTROAMPHETAMINE SACCHARATE, AMPHETAMINE ASPARTATE, DEXTROAMPHETAMINE SULFATE AND AMPHETAMINE SULFATE 7.5; 7.5; 7.5; 7.5 MG/1; MG/1; MG/1; MG/1
30 TABLET ORAL 2 TIMES DAILY
COMMUNITY
End: 2022-05-23

## 2022-05-23 RX ORDER — DEXTROAMPHETAMINE SACCHARATE, AMPHETAMINE ASPARTATE, DEXTROAMPHETAMINE SULFATE AND AMPHETAMINE SULFATE 3.75; 3.75; 3.75; 3.75 MG/1; MG/1; MG/1; MG/1
15 TABLET ORAL 2 TIMES DAILY
Qty: 60 TABLET | Refills: 0 | Status: SHIPPED | OUTPATIENT
Start: 2022-05-23 | End: 2022-06-22

## 2022-05-23 SDOH — SOCIAL STABILITY - SOCIAL INSECURITY: DEPENDENT RELATIVE NEEDING CARE AT HOME: Z63.6

## 2022-05-23 ASSESSMENT — FIBROSIS 4 INDEX: FIB4 SCORE: 1.97

## 2022-05-23 NOTE — ASSESSMENT & PLAN NOTE
New and decompensated problem.  We will have her check her blood pressure once or twice daily for the next week and she will message in with her blood pressure readings.  If she continues to remain elevated then we will need to plan to initiate an antihypertensive.  Goal would be less than 140/80.  We will have our medical assistant contact her in about a week for the updated blood pressure readings and can discuss treatment options at that time.  We will also need her to come in with a blood pressure cuff so we can compare with manual readings to make sure that we are all working with the same numbers.  She voiced understanding and will keep us updated.

## 2022-05-23 NOTE — ASSESSMENT & PLAN NOTE
"She has diagnosis of ADHD 35 years ago.  As I do not normally prescribe this medicine and had trouble acquiring her past medical records from [place] I had her see psychiatry who confirmed the diagnosis and agree.  They recommend no more than 30 mg per 24 hours.  She is having slight hypertension which is multifactorial from stress with her spouse who had a recent recurrent brain bleed as well as use of the Adderall, mirabegron, and occasional anti-inflammatories.  She will return in 3 months for a refill and will meet with my nurse practitioner since I will still be out of the office on maternity leave.    Obtained and reviewed patient utilization report from [facility] pharmacy database on 5/23/2022 8:18 AM  prior to writing prescription for controlled substance II, III or IV per [place] bill . Based on assessment of the report, the prescription is medically necessary.     Patient understands this prescription is a controlled substance which is potentially habit-forming and its use is regulated by the NEIL. We also discussed the new \"black box\" warning regarding the lethal combination of opioids and benzodiazepines. Refills are subject to terms of a controlled substance agreement and patient has an updated one on file. Most recent UDS is appropriate. Any refill requires an office visit. Narcotics have may adverse effects and the risks of addiction, accidental overdose and death were emphasized. Provided prescriptions for the next three months.  "

## 2022-05-23 NOTE — ASSESSMENT & PLAN NOTE
Chronic and decompensated problem.  Encouraged her to look for other outlets for stress, could also consider initiating therapy.  Also wonder if we would have her spouse wear a medical alert bracelet when she is out of the home so she could have some assurance that he has a way to contact help if she is not immediately available.  Provided active listening.

## 2022-05-23 NOTE — ASSESSMENT & PLAN NOTE
Chronic and ongoing problem.  Will increase propanolol back to 10 mg twice daily for essential tremor and this may also help with some of the high blood pressure readings.  She will monitor for fatigue.

## 2022-05-23 NOTE — PROGRESS NOTES
Subjective:   Chief Complaint/History of Present Illness:  Cherelle Crystal is a 76 y.o. female established patient who presents today to discuss medical problems as listed below. Cherelle is unaccompanied for today's visit.    Problem   Primary Hypertension    She has sustained finding of elevated blood pressure on 2 separate clinic visits.  Some may be related to Adderall, mirabegron, and anti-inflammatory use.  She also has increased stress related to her spouse who had a recent recurrent brain bleed.  She is asymptomatic to the elevated blood pressure.  She uses propranolol for tremor but no other dedicated antihypertensives.  Her spouse has a blood pressure cuff at home but she has never checked regularly her self.     Caregiver Stress    She has increased stress related to her partner who recently had a recurrent brain bleed.  She was not at the house when it happened and was very worried about what could have happened and she did not happen to call home at the time he was having the problem.  He was able to get to the ER with the assistance of a neighbor but this definitely added some additional stress which could be contributing to the elevated blood pressure.     Urgency Incontinence    She reports urge incontinence.  She does not have enough time to get to the bathroom and has to wear a pad due to urinary leakage.  She is never tried any medicines for this.  It has been insidious onset.  No history of recurrent urinary tract infections.  We started Myrbetriq 25 mg daily she notes approximately 90% improvement with this medicine, no noted side effects.    Current regimen: Mirabegron 25 mg daily     Essential Tremor    She reports essential tremor noticeable mostly in the left hand 2nd digit but it can also involve both hands. It has slightly progressed over the past year. She was given propranolol 10 mg initially twice daily but this caused fatigue so she reduced to once daily. She is now open to going back up  to twice daily.    Current regimen: propranolol 10 mg twice daily  Previous regimen: propranolol 10 mg once daily     Attention Deficit Hyperactivity Disorder (Adhd), Predominantly Inattentive Type    She reports diagnosis of ADHD at age 41. This was made by her primary care physician, she does not ever recall seeing a psychiatrist. She had challenges with attention related to her job. She was started on Adderall which was increased to 30 mg within the first few months. She denies any side effects from the medicine. 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    She met with psychiatry in April 2022 who confirm diagnosis and agree this medicine is appropriate to continue with maximum dose of 30 mg/24 hours.    Current regimen: adderall 15 mg twice daily          Current Medications:  Current Outpatient Medications Ordered in Epic   Medication Sig Dispense Refill   • propranolol (INDERAL) 10 MG Tab Take 1 Tablet by mouth 2 times a day. 200 Tablet 3   • amphetamine-dextroamphetamine (ADDERALL) 15 MG tablet Take 1 Tablet by mouth 2 times a day for 30 days. 60 Tablet 0   • [START ON 6/22/2022] amphetamine-dextroamphetamine (ADDERALL) 15 MG tablet Take 1 Tablet by mouth 2 times a day for 30 days. 60 Tablet 0   • [START ON 7/22/2022] amphetamine-dextroamphetamine (ADDERALL) 15 MG tablet Take 1 Tablet by mouth 2 times a day for 30 days. 60 Tablet 0   • zinc sulfate (ZINCATE) 220 (50 Zn) MG Cap Take 220 mg by mouth every day.     • vitamin D3 (CHOLECALCIFEROL) 1000 Unit (25 mcg) Tab Take 2,000 Units by mouth every day.     • albuterol 108 (90 Base) MCG/ACT Aero Soln inhalation aerosol Inhale 2 Puffs every 6 hours as needed for Shortness of Breath. 8.5 g 3   • aspirin (ASA) 81 MG Chew Tab chewable tablet Chew 81 mg every day.     • MYRBETRIQ 25 MG TABLET SR 24 HR TAKE 1 TABLET BY MOUTH EVERY  Tablet 3   • cyanocobalamin (VITAMIN B-12) 500 MCG Tab Take 500 mcg by mouth every day.     •  "Glucosamine-Chondroitin (GLUCOSAMINE CHONDR COMPLEX PO) Take  by mouth.     • Ascorbic Acid (VITAMIN C) 1000 MG Tab Take  by mouth.     • ibuprofen (MOTRIN) 800 MG Tab Take 1 Tab by mouth every 8 hours as needed. 30 Tab 0     No current Epic-ordered facility-administered medications on file.          Objective:   Physical Exam:    Vitals: BP (!) 155/75 (BP Location: Left arm, Patient Position: Sitting, BP Cuff Size: Adult)   Pulse 74   Temp 36.2 °C (97.2 °F) (Temporal)   Resp 12   Ht 1.549 m (5' 1\")   Wt 69.9 kg (154 lb)   SpO2 97%    BMI: Body mass index is 29.1 kg/m².  Physical Exam  Constitutional:       General: She is not in acute distress.     Appearance: Normal appearance. She is normal weight. She is not ill-appearing.   Eyes:      General: No scleral icterus.     Conjunctiva/sclera: Conjunctivae normal.   Cardiovascular:      Rate and Rhythm: Normal rate and regular rhythm.      Pulses: Normal pulses.   Pulmonary:      Effort: Pulmonary effort is normal. No respiratory distress.      Breath sounds: Normal breath sounds. No wheezing or rhonchi.   Musculoskeletal:      Right lower leg: No edema.      Left lower leg: No edema.   Skin:     General: Skin is warm and dry.      Findings: No bruising or rash.   Neurological:      Gait: Gait normal.   Psychiatric:         Behavior: Behavior normal.         Thought Content: Thought content normal.         Judgment: Judgment normal.      Comments: Increased stress          Assessment and Plan:   Cherelle is a 76 y.o. female with the following:  Problem List Items Addressed This Visit     Attention deficit hyperactivity disorder (ADHD), predominantly inattentive type     She has diagnosis of ADHD 35 years ago.  As I do not normally prescribe this medicine and had trouble acquiring her past medical records from [place] I had her see psychiatry who confirmed the diagnosis and agree.  They recommend no more than 30 mg per 24 hours.  She is having slight hypertension which " "is multifactorial from stress with her spouse who had a recent recurrent brain bleed as well as use of the Adderall, mirabegron, and occasional anti-inflammatories.  She will return in 3 months for a refill and will meet with my nurse practitioner since I will still be out of the office on maternity leave.    Obtained and reviewed patient utilization report from [facility] pharmacy database on 5/23/2022 8:18 AM  prior to writing prescription for controlled substance II, III or IV per [place] bill . Based on assessment of the report, the prescription is medically necessary.     Patient understands this prescription is a controlled substance which is potentially habit-forming and its use is regulated by the NEIL. We also discussed the new \"black box\" warning regarding the lethal combination of opioids and benzodiazepines. Refills are subject to terms of a controlled substance agreement and patient has an updated one on file. Most recent UDS is appropriate. Any refill requires an office visit. Narcotics have may adverse effects and the risks of addiction, accidental overdose and death were emphasized. Provided prescriptions for the next three months.           Relevant Medications    amphetamine-dextroamphetamine (ADDERALL) 15 MG tablet    amphetamine-dextroamphetamine (ADDERALL) 15 MG tablet (Start on 6/22/2022)    amphetamine-dextroamphetamine (ADDERALL) 15 MG tablet (Start on 7/22/2022)    Essential tremor     Chronic and ongoing problem.  Will increase propanolol back to 10 mg twice daily for essential tremor and this may also help with some of the high blood pressure readings.  She will monitor for fatigue.           Relevant Medications    propranolol (INDERAL) 10 MG Tab    Urgency incontinence     Chronic ongoing problem.  Urinary symptoms are much better but her blood pressure has trended up some which could be related to the mirabegron.  We will have her check her blood pressure once or twice daily at " different times the day for the next week and send us in readings, may need to add an antihypertensive.           Primary hypertension     New and decompensated problem.  We will have her check her blood pressure once or twice daily for the next week and she will message in with her blood pressure readings.  If she continues to remain elevated then we will need to plan to initiate an antihypertensive.  Goal would be less than 140/80.  We will have our medical assistant contact her in about a week for the updated blood pressure readings and can discuss treatment options at that time.  We will also need her to come in with a blood pressure cuff so we can compare with manual readings to make sure that we are all working with the same numbers.  She voiced understanding and will keep us updated.           Relevant Medications    propranolol (INDERAL) 10 MG Tab    Caregiver stress     Chronic and decompensated problem.  Encouraged her to look for other outlets for stress, could also consider initiating therapy.  Also wonder if we would have her spouse wear a medical alert bracelet when she is out of the home so she could have some assurance that he has a way to contact help if she is not immediately available.  Provided active listening.             Other Visit Diagnoses     Need for pneumococcal vaccine        Relevant Orders    Pneumococcal Conjugate Vaccine 20-Valent (19 yrs+) (Completed)          RTC: Return in about 3 months (around 8/23/2022) for with NP while I am out on maternity leave.    I spent a total of 42 minutes with record review, exam, communication with the patient, communication with other providers, and documentation of this encounter.    PLEASE NOTE: This dictation was created using voice recognition software. I have made every reasonable attempt to correct obvious errors, but I expect that there are errors of grammar and possibly content that I did not discover before finalizing the  note.      Farhana Quintanilla,   Geriatric and Internal Medicine  [facility]

## 2022-05-23 NOTE — ASSESSMENT & PLAN NOTE
Chronic ongoing problem.  Urinary symptoms are much better but her blood pressure has trended up some which could be related to the mirabegron.  We will have her check her blood pressure once or twice daily at different times the day for the next week and send us in readings, may need to add an antihypertensive.

## 2022-06-21 ENCOUNTER — TELEPHONE (OUTPATIENT)
Dept: HEALTH INFORMATION MANAGEMENT | Facility: OTHER | Age: 77
End: 2022-06-21

## 2022-06-27 ENCOUNTER — TELEPHONE (OUTPATIENT)
Dept: MEDICAL GROUP | Facility: PHYSICIAN GROUP | Age: 77
End: 2022-06-27
Payer: MEDICARE

## 2022-06-28 NOTE — TELEPHONE ENCOUNTER
1. Caller Name: Cherelle Crystal                          Call Back Number: [phone] (home)         How would the patient prefer to be contacted with a response: Phone call OK to leave a detailed message    Spoke to Cherelle about BP  and PT took her blood pressure while she took Ed [employer]   BP is reported better much lower than when in office.  Feeling ok but tired.

## 2022-06-28 NOTE — TELEPHONE ENCOUNTER
----- Message from Hanny Henry, Med Ass't sent at 5/23/2022  8:24 AM PDT -----  Regarding: BP check  Call to check on BP

## 2022-07-07 DIAGNOSIS — M54.2 NECK PAIN: ICD-10-CM

## 2022-08-23 ENCOUNTER — OFFICE VISIT (OUTPATIENT)
Dept: MEDICAL GROUP | Facility: PHYSICIAN GROUP | Age: 77
End: 2022-08-23
Payer: MEDICARE

## 2022-08-23 VITALS
SYSTOLIC BLOOD PRESSURE: 124 MMHG | BODY MASS INDEX: 29.07 KG/M2 | HEIGHT: 61 IN | RESPIRATION RATE: 14 BRPM | DIASTOLIC BLOOD PRESSURE: 80 MMHG | HEART RATE: 62 BPM | OXYGEN SATURATION: 96 % | WEIGHT: 154 LBS | TEMPERATURE: 98.5 F

## 2022-08-23 DIAGNOSIS — Z02.89 ENCOUNTER FOR COMPLETION OF FORM WITH PATIENT: ICD-10-CM

## 2022-08-23 DIAGNOSIS — F90.0 ATTENTION DEFICIT HYPERACTIVITY DISORDER (ADHD), PREDOMINANTLY INATTENTIVE TYPE: ICD-10-CM

## 2022-08-23 PROCEDURE — 99213 OFFICE O/P EST LOW 20 MIN: CPT | Performed by: FAMILY MEDICINE

## 2022-08-23 RX ORDER — DEXTROAMPHETAMINE SACCHARATE, AMPHETAMINE ASPARTATE, DEXTROAMPHETAMINE SULFATE AND AMPHETAMINE SULFATE 3.75; 3.75; 3.75; 3.75 MG/1; MG/1; MG/1; MG/1
15 TABLET ORAL 2 TIMES DAILY
Qty: 60 TABLET | Refills: 0 | Status: SHIPPED | OUTPATIENT
Start: 2022-09-23 | End: 2022-08-27

## 2022-08-23 RX ORDER — DEXTROAMPHETAMINE SACCHARATE, AMPHETAMINE ASPARTATE, DEXTROAMPHETAMINE SULFATE AND AMPHETAMINE SULFATE 3.75; 3.75; 3.75; 3.75 MG/1; MG/1; MG/1; MG/1
15 TABLET ORAL 2 TIMES DAILY
Qty: 60 TABLET | Refills: 0 | Status: SHIPPED | OUTPATIENT
Start: 2022-10-24 | End: 2022-08-27

## 2022-08-23 RX ORDER — DEXTROAMPHETAMINE SACCHARATE, AMPHETAMINE ASPARTATE, DEXTROAMPHETAMINE SULFATE AND AMPHETAMINE SULFATE 3.75; 3.75; 3.75; 3.75 MG/1; MG/1; MG/1; MG/1
15 TABLET ORAL 2 TIMES DAILY
Qty: 60 TABLET | Refills: 0 | Status: SHIPPED | OUTPATIENT
Start: 2022-08-23 | End: 2022-08-27

## 2022-08-23 ASSESSMENT — FIBROSIS 4 INDEX: FIB4 SCORE: 2

## 2022-08-23 NOTE — ASSESSMENT & PLAN NOTE
Chronic, having the diagnosis of ADHD since age 41.  She is currently on Adderall 15 mg twice daily.  Denies any side effects related to medication usage.  We will continue to monitor and follow-up with PCP every 3 months.

## 2022-08-23 NOTE — PROGRESS NOTES
Subjective:     CC:   Chief Complaint   Patient presents with    Medication Refill     Adderall     Paperwork     DMV        HPI:   Cherelle presents today with medication refill and needing DMV form filled out.     Attention deficit hyperactivity disorder (ADHD), predominantly inattentive type  Chronic, having the diagnosis of ADHD since age 41.  She is currently on Adderall 15 mg twice daily.  Denies any side effects related to medication usage.  We will continue to monitor and follow-up with PCP every 3 months.      Current Outpatient Medications Ordered in Epic   Medication Sig Dispense Refill    amphetamine-dextroamphetamine (ADDERALL) 15 MG tablet Take 1 Tablet by mouth 2 times a day for 30 days. 60 Tablet 0    [START ON 9/23/2022] amphetamine-dextroamphetamine (ADDERALL) 15 MG tablet Take 1 Tablet by mouth 2 times a day for 30 days. 60 Tablet 0    [START ON 10/24/2022] amphetamine-dextroamphetamine (ADDERALL) 15 MG tablet Take 1 Tablet by mouth 2 times a day for 30 days. 60 Tablet 0    propranolol (INDERAL) 10 MG Tab TAKE 1 TABLET BY MOUTH EVERY DAY (Patient taking differently: 2 times a day.) 100 Tablet 3    zinc sulfate (ZINCATE) 220 (50 Zn) MG Cap Take 220 mg by mouth every day.      vitamin D3 (CHOLECALCIFEROL) 1000 Unit (25 mcg) Tab Take 2,000 Units by mouth every day.      albuterol 108 (90 Base) MCG/ACT Aero Soln inhalation aerosol Inhale 2 Puffs every 6 hours as needed for Shortness of Breath. 8.5 g 3    aspirin (ASA) 81 MG Chew Tab chewable tablet Chew 81 mg every day.      MYRBETRIQ 25 MG TABLET SR 24 HR TAKE 1 TABLET BY MOUTH EVERY  Tablet 3    cyanocobalamin (VITAMIN B-12) 500 MCG Tab Take 500 mcg by mouth every day.      Glucosamine-Chondroitin (GLUCOSAMINE CHONDR COMPLEX PO) Take  by mouth.      Ascorbic Acid (VITAMIN C) 1000 MG Tab Take  by mouth.      ibuprofen (MOTRIN) 800 MG Tab Take 1 Tab by mouth every 8 hours as needed. 30 Tab 0     No current Epic-ordered facility-administered  "medications on file.       Health Maintenance: Completed        Objective:     Exam:  /80 (BP Location: Right arm, Patient Position: Sitting, BP Cuff Size: Adult)   Pulse 62   Temp 36.9 °C (98.5 °F) (Temporal)   Resp 14   Ht 1.549 m (5' 1\")   Wt 69.9 kg (154 lb)   SpO2 96%   BMI 29.10 kg/m²  Body mass index is 29.1 kg/m².    Physical Exam  Vitals reviewed.   Constitutional:       General: She is not in acute distress.     Appearance: Normal appearance.   Eyes:      Conjunctiva/sclera: Conjunctivae normal.      Pupils: Pupils are equal, round, and reactive to light.   Cardiovascular:      Pulses: Normal pulses.      Heart sounds: Normal heart sounds.   Pulmonary:      Effort: Pulmonary effort is normal. No respiratory distress.      Breath sounds: Normal breath sounds. No stridor. No wheezing, rhonchi or rales.   Chest:      Chest wall: No tenderness.   Musculoskeletal:      Right lower leg: No edema.      Left lower leg: No edema.   Neurological:      Mental Status: She is alert and oriented to person, place, and time.   Psychiatric:         Mood and Affect: Mood normal.         Behavior: Behavior normal.        A chaperone was offered to the patient during today's exam. Patient declined chaperone.      Assessment & Plan:     77 y.o. female with the following -     1. Attention deficit hyperactivity disorder (ADHD), predominantly inattentive type  -Chronic, stable on Adderall 15 mg twice daily.  Follow-up up with PCP in 3 months  I have obtained and reviewed patient utilization report from [facility] pharmacy database on 8/23/22  prior to writing prescription for controlled substance II, III or IV per [place] bill . Based on assessment of the report, my physical exam, and the patient's health problem, the prescription is medically necessary.    Additionally, I have discussed the risk and benefits, treatment plan, and alternative therapies with the patient     amphetamine-dextroamphetamine " (ADDERALL) 15 MG tablet; Take 1 Tablet by mouth 2 times a day for 30 days.  Dispense: 60 Tablet; Refill: 0  - amphetamine-dextroamphetamine (ADDERALL) 15 MG tablet; Take 1 Tablet by mouth 2 times a day for 30 days.  Dispense: 60 Tablet; Refill: 0  - amphetamine-dextroamphetamine (ADDERALL) 15 MG tablet; Take 1 Tablet by mouth 2 times a day for 30 days.  Dispense: 60 Tablet; Refill: 0    2. Encounter for completion of form with patient   DMV paperwork brought into clinic today, eye exam done by medical assistant in clinic and DMV form filled out and signed and given to patient.   I spent a total of 20 minutes with record review, exam, communication with the patient, communication with other providers, and documentation of this encounter.      Return in about 3 months (around 11/23/2022) for medication refill on Adderall .    Please note that this dictation was created using voice recognition software. I have made every reasonable attempt to correct obvious errors, but I expect that there are errors of grammar and possibly content that I did not discover before finalizing the note.

## 2022-08-27 ENCOUNTER — APPOINTMENT (OUTPATIENT)
Dept: RADIOLOGY | Facility: MEDICAL CENTER | Age: 77
DRG: 378 | End: 2022-08-27
Attending: EMERGENCY MEDICINE
Payer: MEDICARE

## 2022-08-27 ENCOUNTER — HOSPITAL ENCOUNTER (INPATIENT)
Facility: MEDICAL CENTER | Age: 77
LOS: 2 days | DRG: 378 | End: 2022-08-30
Attending: EMERGENCY MEDICINE | Admitting: HOSPITALIST
Payer: MEDICARE

## 2022-08-27 ENCOUNTER — OFFICE VISIT (OUTPATIENT)
Dept: URGENT CARE | Facility: CLINIC | Age: 77
End: 2022-08-27
Payer: MEDICARE

## 2022-08-27 VITALS
OXYGEN SATURATION: 97 % | WEIGHT: 153 LBS | SYSTOLIC BLOOD PRESSURE: 130 MMHG | BODY MASS INDEX: 28.16 KG/M2 | TEMPERATURE: 98.2 F | RESPIRATION RATE: 16 BRPM | HEIGHT: 62 IN | DIASTOLIC BLOOD PRESSURE: 68 MMHG | HEART RATE: 82 BPM

## 2022-08-27 DIAGNOSIS — K92.2 GASTROINTESTINAL HEMORRHAGE, UNSPECIFIED GASTROINTESTINAL HEMORRHAGE TYPE: ICD-10-CM

## 2022-08-27 DIAGNOSIS — K92.1 BLACK STOOL: ICD-10-CM

## 2022-08-27 DIAGNOSIS — K92.2 UGIB (UPPER GASTROINTESTINAL BLEED): ICD-10-CM

## 2022-08-27 DIAGNOSIS — R19.7 ACUTE DIARRHEA: ICD-10-CM

## 2022-08-27 LAB
ABO + RH BLD: NORMAL
ABO GROUP BLD: NORMAL
ALBUMIN SERPL BCP-MCNC: 4.2 G/DL (ref 3.2–4.9)
ALBUMIN/GLOB SERPL: 1.7 G/DL
ALP SERPL-CCNC: 54 U/L (ref 30–99)
ALT SERPL-CCNC: 13 U/L (ref 2–50)
ANION GAP SERPL CALC-SCNC: 9 MMOL/L (ref 7–16)
APTT PPP: 31.1 SEC (ref 24.7–36)
AST SERPL-CCNC: 16 U/L (ref 12–45)
BASOPHILS # BLD AUTO: 0.5 % (ref 0–1.8)
BASOPHILS # BLD: 0.03 K/UL (ref 0–0.12)
BILIRUB SERPL-MCNC: 0.6 MG/DL (ref 0.1–1.5)
BLD GP AB SCN SERPL QL: NORMAL
BUN SERPL-MCNC: 17 MG/DL (ref 8–22)
CALCIUM SERPL-MCNC: 9 MG/DL (ref 8.4–10.2)
CHLORIDE SERPL-SCNC: 104 MMOL/L (ref 96–112)
CO2 SERPL-SCNC: 26 MMOL/L (ref 20–33)
CREAT SERPL-MCNC: 0.61 MG/DL (ref 0.5–1.4)
EOSINOPHIL # BLD AUTO: 0.09 K/UL (ref 0–0.51)
EOSINOPHIL NFR BLD: 1.4 % (ref 0–6.9)
ERYTHROCYTE [DISTWIDTH] IN BLOOD BY AUTOMATED COUNT: 47.8 FL (ref 35.9–50)
GFR SERPLBLD CREATININE-BSD FMLA CKD-EPI: 93 ML/MIN/1.73 M 2
GLOBULIN SER CALC-MCNC: 2.5 G/DL (ref 1.9–3.5)
GLUCOSE SERPL-MCNC: 109 MG/DL (ref 65–99)
HCT VFR BLD AUTO: 36.8 % (ref 37–47)
HGB BLD-MCNC: 12.1 G/DL (ref 12–16)
IMM GRANULOCYTES # BLD AUTO: 0.01 K/UL (ref 0–0.11)
IMM GRANULOCYTES NFR BLD AUTO: 0.2 % (ref 0–0.9)
INR PPP: 1.13 (ref 0.87–1.13)
LIPASE SERPL-CCNC: 23 U/L (ref 7–58)
LYMPHOCYTES # BLD AUTO: 1.66 K/UL (ref 1–4.8)
LYMPHOCYTES NFR BLD: 25.8 % (ref 22–41)
MCH RBC QN AUTO: 31.8 PG (ref 27–33)
MCHC RBC AUTO-ENTMCNC: 32.9 G/DL (ref 33.6–35)
MCV RBC AUTO: 96.6 FL (ref 81.4–97.8)
MONOCYTES # BLD AUTO: 0.65 K/UL (ref 0–0.85)
MONOCYTES NFR BLD AUTO: 10.1 % (ref 0–13.4)
NEUTROPHILS # BLD AUTO: 3.99 K/UL (ref 2–7.15)
NEUTROPHILS NFR BLD: 62 % (ref 44–72)
NRBC # BLD AUTO: 0 K/UL
NRBC BLD-RTO: 0 /100 WBC
PLATELET # BLD AUTO: 224 K/UL (ref 164–446)
PMV BLD AUTO: 11.9 FL (ref 9–12.9)
POTASSIUM SERPL-SCNC: 4.5 MMOL/L (ref 3.6–5.5)
PROT SERPL-MCNC: 6.7 G/DL (ref 6–8.2)
PROTHROMBIN TIME: 14 SEC (ref 12–14.6)
RBC # BLD AUTO: 3.81 M/UL (ref 4.2–5.4)
RH BLD: NORMAL
SODIUM SERPL-SCNC: 139 MMOL/L (ref 135–145)
WBC # BLD AUTO: 6.4 K/UL (ref 4.8–10.8)

## 2022-08-27 PROCEDURE — 80053 COMPREHEN METABOLIC PANEL: CPT

## 2022-08-27 PROCEDURE — 700102 HCHG RX REV CODE 250 W/ 637 OVERRIDE(OP): Performed by: STUDENT IN AN ORGANIZED HEALTH CARE EDUCATION/TRAINING PROGRAM

## 2022-08-27 PROCEDURE — C9113 INJ PANTOPRAZOLE SODIUM, VIA: HCPCS | Performed by: EMERGENCY MEDICINE

## 2022-08-27 PROCEDURE — 99285 EMERGENCY DEPT VISIT HI MDM: CPT

## 2022-08-27 PROCEDURE — 86900 BLOOD TYPING SEROLOGIC ABO: CPT

## 2022-08-27 PROCEDURE — G0378 HOSPITAL OBSERVATION PER HR: HCPCS

## 2022-08-27 PROCEDURE — 96374 THER/PROPH/DIAG INJ IV PUSH: CPT

## 2022-08-27 PROCEDURE — A9270 NON-COVERED ITEM OR SERVICE: HCPCS | Performed by: STUDENT IN AN ORGANIZED HEALTH CARE EDUCATION/TRAINING PROGRAM

## 2022-08-27 PROCEDURE — 85610 PROTHROMBIN TIME: CPT

## 2022-08-27 PROCEDURE — 86901 BLOOD TYPING SEROLOGIC RH(D): CPT

## 2022-08-27 PROCEDURE — 85025 COMPLETE CBC W/AUTO DIFF WBC: CPT

## 2022-08-27 PROCEDURE — 71045 X-RAY EXAM CHEST 1 VIEW: CPT

## 2022-08-27 PROCEDURE — 86850 RBC ANTIBODY SCREEN: CPT

## 2022-08-27 PROCEDURE — 85730 THROMBOPLASTIN TIME PARTIAL: CPT

## 2022-08-27 PROCEDURE — 99214 OFFICE O/P EST MOD 30 MIN: CPT | Performed by: NURSE PRACTITIONER

## 2022-08-27 PROCEDURE — 99220 PR INITIAL OBSERVATION CARE,LEVL III: CPT | Performed by: STUDENT IN AN ORGANIZED HEALTH CARE EDUCATION/TRAINING PROGRAM

## 2022-08-27 PROCEDURE — 36415 COLL VENOUS BLD VENIPUNCTURE: CPT

## 2022-08-27 PROCEDURE — 83690 ASSAY OF LIPASE: CPT

## 2022-08-27 PROCEDURE — 700111 HCHG RX REV CODE 636 W/ 250 OVERRIDE (IP): Performed by: EMERGENCY MEDICINE

## 2022-08-27 RX ORDER — NAPROXEN SODIUM 220 MG
220 TABLET ORAL 2 TIMES DAILY PRN
Status: ON HOLD | COMMUNITY
End: 2022-08-30

## 2022-08-27 RX ORDER — PANTOPRAZOLE SODIUM 40 MG/10ML
40 INJECTION, POWDER, LYOPHILIZED, FOR SOLUTION INTRAVENOUS ONCE
Status: COMPLETED | OUTPATIENT
Start: 2022-08-27 | End: 2022-08-27

## 2022-08-27 RX ORDER — ALBUTEROL SULFATE 90 UG/1
1-2 AEROSOL, METERED RESPIRATORY (INHALATION) EVERY 6 HOURS PRN
COMMUNITY

## 2022-08-27 RX ORDER — PANTOPRAZOLE SODIUM 40 MG/10ML
40 INJECTION, POWDER, LYOPHILIZED, FOR SOLUTION INTRAVENOUS DAILY
Status: DISCONTINUED | OUTPATIENT
Start: 2022-08-28 | End: 2022-08-28

## 2022-08-27 RX ORDER — BISACODYL 10 MG
10 SUPPOSITORY, RECTAL RECTAL
Status: DISCONTINUED | OUTPATIENT
Start: 2022-08-27 | End: 2022-08-30 | Stop reason: HOSPADM

## 2022-08-27 RX ORDER — LABETALOL HYDROCHLORIDE 5 MG/ML
10 INJECTION, SOLUTION INTRAVENOUS EVERY 4 HOURS PRN
Status: DISCONTINUED | OUTPATIENT
Start: 2022-08-27 | End: 2022-08-30 | Stop reason: HOSPADM

## 2022-08-27 RX ORDER — AMOXICILLIN 250 MG
2 CAPSULE ORAL 2 TIMES DAILY
Status: DISCONTINUED | OUTPATIENT
Start: 2022-08-27 | End: 2022-08-30 | Stop reason: HOSPADM

## 2022-08-27 RX ORDER — MIRABEGRON 25 MG/1
25 TABLET, FILM COATED, EXTENDED RELEASE ORAL EVERY EVENING
COMMUNITY
End: 2022-11-14 | Stop reason: SDUPTHER

## 2022-08-27 RX ORDER — PROPRANOLOL HYDROCHLORIDE 10 MG/1
10 TABLET ORAL 2 TIMES DAILY
COMMUNITY
End: 2022-11-14

## 2022-08-27 RX ORDER — PROPRANOLOL HYDROCHLORIDE 10 MG/1
10 TABLET ORAL 2 TIMES DAILY
Status: DISCONTINUED | OUTPATIENT
Start: 2022-08-27 | End: 2022-08-30 | Stop reason: HOSPADM

## 2022-08-27 RX ORDER — DEXTROAMPHETAMINE SACCHARATE, AMPHETAMINE ASPARTATE, DEXTROAMPHETAMINE SULFATE AND AMPHETAMINE SULFATE 3.75; 3.75; 3.75; 3.75 MG/1; MG/1; MG/1; MG/1
15 TABLET ORAL 2 TIMES DAILY PRN
COMMUNITY
End: 2022-09-06 | Stop reason: SDUPTHER

## 2022-08-27 RX ORDER — ACETAMINOPHEN 325 MG/1
650 TABLET ORAL EVERY 6 HOURS PRN
Status: DISCONTINUED | OUTPATIENT
Start: 2022-08-27 | End: 2022-08-30 | Stop reason: HOSPADM

## 2022-08-27 RX ORDER — POLYETHYLENE GLYCOL 3350 17 G/17G
1 POWDER, FOR SOLUTION ORAL
Status: DISCONTINUED | OUTPATIENT
Start: 2022-08-27 | End: 2022-08-30 | Stop reason: HOSPADM

## 2022-08-27 RX ADMIN — PROPRANOLOL HYDROCHLORIDE 10 MG: 20 TABLET ORAL at 18:15

## 2022-08-27 RX ADMIN — PANTOPRAZOLE SODIUM 40 MG: 40 INJECTION, POWDER, LYOPHILIZED, FOR SOLUTION INTRAVENOUS at 17:59

## 2022-08-27 ASSESSMENT — COGNITIVE AND FUNCTIONAL STATUS - GENERAL
SUGGESTED CMS G CODE MODIFIER MOBILITY: CH
MOBILITY SCORE: 24
SUGGESTED CMS G CODE MODIFIER DAILY ACTIVITY: CH
DAILY ACTIVITIY SCORE: 24

## 2022-08-27 ASSESSMENT — LIFESTYLE VARIABLES
TOTAL SCORE: 0
CONSUMPTION TOTAL: NEGATIVE
ALCOHOL_USE: YES
TOTAL SCORE: 0
HAVE PEOPLE ANNOYED YOU BY CRITICIZING YOUR DRINKING: NO
AVERAGE NUMBER OF DAYS PER WEEK YOU HAVE A DRINK CONTAINING ALCOHOL: 2
HAVE YOU EVER FELT YOU SHOULD CUT DOWN ON YOUR DRINKING: NO
HOW MANY TIMES IN THE PAST YEAR HAVE YOU HAD 5 OR MORE DRINKS IN A DAY: 0
EVER HAD A DRINK FIRST THING IN THE MORNING TO STEADY YOUR NERVES TO GET RID OF A HANGOVER: NO
ON A TYPICAL DAY WHEN YOU DRINK ALCOHOL HOW MANY DRINKS DO YOU HAVE: 1
TOTAL SCORE: 0
EVER FELT BAD OR GUILTY ABOUT YOUR DRINKING: NO

## 2022-08-27 ASSESSMENT — ENCOUNTER SYMPTOMS
ABDOMINAL PAIN: 0
FEVER: 0
BACK PAIN: 0
HEADACHES: 0
SHORTNESS OF BREATH: 0
BLURRED VISION: 0
EYE REDNESS: 0
DIZZINESS: 0
BLOOD IN STOOL: 1
COUGH: 0
VOMITING: 0
DIARRHEA: 0
CHILLS: 0
NECK PAIN: 0
NAUSEA: 0
SEIZURES: 0
SORE THROAT: 0
FOCAL WEAKNESS: 0

## 2022-08-27 ASSESSMENT — PATIENT HEALTH QUESTIONNAIRE - PHQ9
1. LITTLE INTEREST OR PLEASURE IN DOING THINGS: NOT AT ALL
SUM OF ALL RESPONSES TO PHQ9 QUESTIONS 1 AND 2: 0
2. FEELING DOWN, DEPRESSED, IRRITABLE, OR HOPELESS: NOT AT ALL

## 2022-08-27 ASSESSMENT — FIBROSIS 4 INDEX
FIB4 SCORE: 1.505614818325621903
FIB4 SCORE: 2
FIB4 SCORE: 2

## 2022-08-27 ASSESSMENT — PAIN DESCRIPTION - PAIN TYPE: TYPE: ACUTE PAIN

## 2022-08-27 NOTE — PROGRESS NOTES
Chief Complaint   Patient presents with    Diarrhea     Pt has diarrhea, blood in stool, thick stool, abdominal discomfort  x 3 days        HISTORY OF PRESENT ILLNESS: Patient is a pleasant 77 y.o. female who presents to urgent care today with complaints of diarrhea.  Patient notes that she has had symptoms started 3 days ago with frequent diarrhea.  Her diarrhea is described as black with as well as bright red.  Reports fatigue and lower abdominal discomfort.  She denies any fever, chills, nausea, vomiting.  Denies history of NSAID intake, recent antibiotic use, camping, history of abdominal surgery.  She has not tried any medication for symptom relief.    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 8.5 g 3    MYRBETRIQ 25 MG TABLET SR 24 HR TAKE 1 TABLET BY MOUTH EVERY  Tablet 3     No current Epic-ordered facility-administered medications on file.       Past Medical History:   Diagnosis Date    Hyperlipidemia     Leg paresthesia 6/14/2021    She reports abnormal sensations in her bilateral lower extremities. Starts int he feet and goes up to the knees with some additional abnormalities in the bilateral upper extremities. No recent lab work. No preceding injury. Denies known side effects from  "from medications.       Social History     Tobacco Use    Smoking status: Never    Smokeless tobacco: Never   Vaping Use    Vaping Use: Never used   Substance Use Topics    Alcohol use: Not Currently    Drug use: Never       Family Status   Relation Name Status    Mo      Fa      Hoda Aburto Alive    Catracho Wetzel Alive    MGMo      MGFa      PGMo      PGFa      Hoda Malik Alive    Son Javier Alive    Brenda Hook Alive     Family History   Problem Relation Age of Onset    Allergies Mother     Cancer Father         prostate    Diabetes Sister     Heart Disease Sister     Stroke Brother     Stroke Paternal Grandmother     Cancer Paternal Grandfather         throat    No Known Problems Sister     Hyperlipidemia Son     Heart Disease Daughter     Hyperlipidemia Daughter        ROS:  Review of Systems   Constitutional: Positive for fatigue.  Negative for fever, chills, weight loss, malaise.  HENT: Negative for ear pain, nosebleeds, congestion, sore throat and neck pain.    Eyes: Negative for vision changes.   Neuro: Negative for headache, sensory changes, weakness, seizure, LOC.   Cardiovascular: Negative for chest pain, palpitations, orthopnea and leg swelling.   Respiratory: Negative for cough, sputum production, shortness of breath and wheezing.   Gastrointestinal: Positive for abdominal pain, black and bloody diarrhea.   Genitourinary: Negative for dysuria, urgency and frequency.  Musculoskeletal: Negative for falls, neck pain, back pain, joint pain, myalgias.   Skin: Negative for rash, diaphoresis.     Exam:  /68 (BP Location: Left arm, Patient Position: Sitting, BP Cuff Size: Adult)   Pulse 82   Temp 36.8 °C (98.2 °F) (Temporal)   Resp 16   Ht 1.575 m (5' 2\")   Wt 69.4 kg (153 lb)   SpO2 97%   General: well-nourished, well-developed female in NAD  Head: normocephalic, atraumatic  Eyes: PERRLA, no conjunctival injection, acuity grossly intact, lids " normal.  Ears: normal shape and symmetry, no tenderness, no discharge. External canals are without any significant edema or erythema. Tympanic membranes are without any inflammation, no effusion. Gross auditory acuity is intact.  Nose: symmetrical without tenderness, no discharge.  Mouth/Throat: reasonable hygiene, no erythema, exudates or tonsillar enlargement.  Neck: no masses, range of motion within normal limits, no tracheal deviation. No obvious thyroid enlargement.   Lymph: no cervical adenopathy. No supraclavicular adenopathy.   Neuro: alert and oriented. Cranial nerves 1-12 grossly intact. No sensory deficit.   Cardiovascular: regular rate and rhythm. No edema.  Pulmonary: no distress. Chest is symmetrical with respiration, no wheezes, crackles, or rhonchi.   Abdomen: soft, midline lower abdominal tenderness, no guarding, no hepatosplenomegaly.  Musculoskeletal: no clubbing, appropriate muscle tone, gait is stable.  Skin: warm, dry, intact, no clubbing, no cyanosis, no rashes.   Psych: appropriate mood, affect, judgement.         Assessment/Plan:  1. Acute diarrhea        2. Black stool            Patient is a 77-year-old female with acute onset of diarrhea which is described as black and bright red.  She denies taking any medication which may alter the color of her stool.  She reports associated fatigue and abdominal cramping. Differential diagnoses include but are not limited to: Gastrointestinal bleed, diverticulitis, colitis, gastroenteritis. At this time, I feel the patient requires a higher level of care in the ED for closer monitoring, stat lab work and/or imaging for further evaluation. This has been discussed with the patient and she states agreement and understanding. The patient is stable to leave [facility] at this time and will go directly to ED without delay, she will remain n.p.o.           Please note that this dictation was created using voice recognition software. I have made every reasonable  attempt to correct obvious errors, but I expect that there are errors of grammar and possibly content that I did not discover before finalizing the note.      CHRISTINE Concepcion.

## 2022-08-27 NOTE — ED TRIAGE NOTES
Pt presents by self from home for black diarrhea with blood for past 3 days. +fatigue. Pt has refrained from eating to avoid BMs. No fever. Pt A&Ox4 and ambulatory.

## 2022-08-28 PROBLEM — K92.2 GI BLEED: Status: ACTIVE | Noted: 2022-08-28

## 2022-08-28 LAB
ERYTHROCYTE [DISTWIDTH] IN BLOOD BY AUTOMATED COUNT: 48.3 FL (ref 35.9–50)
ERYTHROCYTE [DISTWIDTH] IN BLOOD BY AUTOMATED COUNT: 48.7 FL (ref 35.9–50)
HCT VFR BLD AUTO: 33.2 % (ref 37–47)
HCT VFR BLD AUTO: 33.8 % (ref 37–47)
HGB BLD-MCNC: 10.8 G/DL (ref 12–16)
HGB BLD-MCNC: 11.2 G/DL (ref 12–16)
MCH RBC QN AUTO: 31.8 PG (ref 27–33)
MCH RBC QN AUTO: 32 PG (ref 27–33)
MCHC RBC AUTO-ENTMCNC: 32.5 G/DL (ref 33.6–35)
MCHC RBC AUTO-ENTMCNC: 33.1 G/DL (ref 33.6–35)
MCV RBC AUTO: 96.6 FL (ref 81.4–97.8)
MCV RBC AUTO: 97.6 FL (ref 81.4–97.8)
PLATELET # BLD AUTO: 191 K/UL (ref 164–446)
PLATELET # BLD AUTO: 205 K/UL (ref 164–446)
PMV BLD AUTO: 12 FL (ref 9–12.9)
PMV BLD AUTO: 12.1 FL (ref 9–12.9)
RBC # BLD AUTO: 3.4 M/UL (ref 4.2–5.4)
RBC # BLD AUTO: 3.5 M/UL (ref 4.2–5.4)
WBC # BLD AUTO: 5.5 K/UL (ref 4.8–10.8)
WBC # BLD AUTO: 6.2 K/UL (ref 4.8–10.8)

## 2022-08-28 PROCEDURE — C9113 INJ PANTOPRAZOLE SODIUM, VIA: HCPCS | Performed by: INTERNAL MEDICINE

## 2022-08-28 PROCEDURE — 36415 COLL VENOUS BLD VENIPUNCTURE: CPT

## 2022-08-28 PROCEDURE — 85027 COMPLETE CBC AUTOMATED: CPT

## 2022-08-28 PROCEDURE — 700111 HCHG RX REV CODE 636 W/ 250 OVERRIDE (IP): Performed by: INTERNAL MEDICINE

## 2022-08-28 PROCEDURE — 700111 HCHG RX REV CODE 636 W/ 250 OVERRIDE (IP): Performed by: STUDENT IN AN ORGANIZED HEALTH CARE EDUCATION/TRAINING PROGRAM

## 2022-08-28 PROCEDURE — 700105 HCHG RX REV CODE 258: Performed by: HOSPITALIST

## 2022-08-28 PROCEDURE — 94760 N-INVAS EAR/PLS OXIMETRY 1: CPT

## 2022-08-28 PROCEDURE — C9113 INJ PANTOPRAZOLE SODIUM, VIA: HCPCS | Performed by: STUDENT IN AN ORGANIZED HEALTH CARE EDUCATION/TRAINING PROGRAM

## 2022-08-28 PROCEDURE — 99233 SBSQ HOSP IP/OBS HIGH 50: CPT | Performed by: HOSPITALIST

## 2022-08-28 PROCEDURE — 700102 HCHG RX REV CODE 250 W/ 637 OVERRIDE(OP): Performed by: STUDENT IN AN ORGANIZED HEALTH CARE EDUCATION/TRAINING PROGRAM

## 2022-08-28 PROCEDURE — A9270 NON-COVERED ITEM OR SERVICE: HCPCS | Performed by: STUDENT IN AN ORGANIZED HEALTH CARE EDUCATION/TRAINING PROGRAM

## 2022-08-28 PROCEDURE — 770006 HCHG ROOM/CARE - MED/SURG/GYN SEMI*

## 2022-08-28 PROCEDURE — 96376 TX/PRO/DX INJ SAME DRUG ADON: CPT

## 2022-08-28 RX ORDER — SODIUM CHLORIDE 9 MG/ML
INJECTION, SOLUTION INTRAVENOUS CONTINUOUS
Status: DISCONTINUED | OUTPATIENT
Start: 2022-08-28 | End: 2022-08-29

## 2022-08-28 RX ORDER — PANTOPRAZOLE SODIUM 40 MG/10ML
40 INJECTION, POWDER, LYOPHILIZED, FOR SOLUTION INTRAVENOUS 2 TIMES DAILY
Status: DISCONTINUED | OUTPATIENT
Start: 2022-08-28 | End: 2022-08-29

## 2022-08-28 RX ADMIN — PANTOPRAZOLE SODIUM 40 MG: 40 INJECTION, POWDER, LYOPHILIZED, FOR SOLUTION INTRAVENOUS at 05:47

## 2022-08-28 RX ADMIN — PROPRANOLOL HYDROCHLORIDE 10 MG: 20 TABLET ORAL at 17:21

## 2022-08-28 RX ADMIN — SODIUM CHLORIDE: 9 INJECTION, SOLUTION INTRAVENOUS at 09:26

## 2022-08-28 RX ADMIN — PROPRANOLOL HYDROCHLORIDE 10 MG: 20 TABLET ORAL at 05:47

## 2022-08-28 RX ADMIN — PANTOPRAZOLE SODIUM 40 MG: 40 INJECTION, POWDER, FOR SOLUTION INTRAVENOUS at 17:21

## 2022-08-28 RX ADMIN — SENNOSIDES AND DOCUSATE SODIUM 2 TABLET: 50; 8.6 TABLET ORAL at 17:21

## 2022-08-28 ASSESSMENT — ENCOUNTER SYMPTOMS
ABDOMINAL PAIN: 0
PSYCHIATRIC NEGATIVE: 1
EYES NEGATIVE: 1
VOMITING: 0
SHORTNESS OF BREATH: 0
STRIDOR: 0
EYE DISCHARGE: 0
MUSCULOSKELETAL NEGATIVE: 1
FEVER: 0
BRUISES/BLEEDS EASILY: 0
HEADACHES: 0
DIZZINESS: 0
NAUSEA: 0
CARDIOVASCULAR NEGATIVE: 1
NERVOUS/ANXIOUS: 0
NEUROLOGICAL NEGATIVE: 1
RESPIRATORY NEGATIVE: 1

## 2022-08-28 ASSESSMENT — PAIN DESCRIPTION - PAIN TYPE: TYPE: ACUTE PAIN

## 2022-08-28 NOTE — CONSULTS
Gastroenterology Consult Note:    HI Banks/Rashi Verdugo MD  Date & Time note created:    8/28/2022   11:24 AM     Referring MD:  Dr. Marvin Fitzpatrick    Patient ID:  Name:             Cherelle Crystal   YOB: 1945  Age:                 77 y.o.  female   MRN:               [id number]                                                             Reason for Consult:      Melena x 3 days    History of Present Illness:    This is a 78 yo female PMH HLD, hand tremors who was admitted to the hospital 8/27/22 with a 3 day history of black tarry stools. Denies fevers, chills, abdominal pain, N/V, GERD. VSS. Hgb: 12.1-->11.2. BUN/crt: WNL. Glucose: 109. Takes Aleve 3 to 4 times per day as needed for general aches. No history of GI bleed. Never had a colonoscopy.     Review of Systems:      Review of Systems   Constitutional:  Positive for malaise/fatigue.   HENT: Negative.     Eyes: Negative.    Respiratory: Negative.     Cardiovascular: Negative.    Gastrointestinal:  Positive for melena.   Genitourinary: Negative.    Musculoskeletal: Negative.    Skin: Negative.    Neurological: Negative.    Psychiatric/Behavioral: Negative.             Physical Exam:  Vitals/ General Appearance:   Weight/BMI: Body mass index is 26.98 kg/m².    Vitals:    08/27/22 1815 08/27/22 1856 08/27/22 1955 08/28/22 0405   BP: 138/85 138/85 (!) 150/59 (!) 142/53   Pulse: 71 68 67 66   Resp:  16 20 20   Temp:  36.7 °C (98.1 °F) 36.9 °C (98.4 °F) 36.9 °C (98.5 °F)   TempSrc:  Temporal Oral Oral   SpO2:  98% 97% 95%   Weight:   66.9 kg (147 lb 7.8 oz)    Height:         Oxygen Therapy:  Pulse Oximetry: 95 %, O2 (LPM): 0, O2 Delivery Device: None - Room Air    Physical Exam  Vitals reviewed.   Constitutional:       Appearance: She is obese.   HENT:      Mouth/Throat:      Mouth: Mucous membranes are moist.   Eyes:      Extraocular Movements: Extraocular movements intact.      Pupils: Pupils are equal, round, and reactive to  light.   Cardiovascular:      Rate and Rhythm: Normal rate and regular rhythm.   Pulmonary:      Effort: Pulmonary effort is normal.      Breath sounds: Normal breath sounds.   Abdominal:      General: Bowel sounds are normal.      Palpations: Abdomen is soft.      Tenderness: There is no abdominal tenderness.   Musculoskeletal:         General: Normal range of motion.      Cervical back: Normal range of motion and neck supple.   Skin:     General: Skin is warm and dry.      Capillary Refill: Capillary refill takes less than 2 seconds.   Neurological:      General: No focal deficit present.      Mental Status: She is alert and oriented to person, place, and time.   Psychiatric:         Mood and Affect: Mood normal.         Behavior: Behavior normal.         Thought Content: Thought content normal.         Judgment: Judgment normal.       Past Medical History:   Past Medical History:   Diagnosis Date    Hyperlipidemia     Leg paresthesia 6/14/2021    She reports abnormal sensations in her bilateral lower extremities. Starts int he feet and goes up to the knees with some additional abnormalities in the bilateral upper extremities. No recent lab work. No preceding injury. Denies known side effects from from medications.       Past Surgical History:  History reviewed. No pertinent surgical history.    Hospital Medications:    Current Facility-Administered Medications:     NS infusion, , Intravenous, Continuous, Marvin Fitzpatrick M.D., Last Rate: 83 mL/hr at 08/28/22 0926, New Bag at 08/28/22 0926    senna-docusate (PERICOLACE or SENOKOT S) 8.6-50 MG per tablet 2 Tablet, 2 Tablet, Oral, BID **AND** polyethylene glycol/lytes (MIRALAX) PACKET 1 Packet, 1 Packet, Oral, QDAY PRN **AND** magnesium hydroxide (MILK OF MAGNESIA) suspension 30 mL, 30 mL, Oral, QDAY PRN **AND** bisacodyl (DULCOLAX) suppository 10 mg, 10 mg, Rectal, QDAY PRN, Priscilla Campuzano M.D.    acetaminophen (Tylenol) tablet 650 mg, 650 mg, Oral, Q6HRS PRN,  Priscilla Campuzano M.D.    labetalol (NORMODYNE/TRANDATE) injection 10 mg, 10 mg, Intravenous, Q4HRS PRN, Priscilla Campuzano M.D.    pantoprazole (Protonix) injection 40 mg, 40 mg, Intravenous, DAILY, Priscilla Campuzano M.D., 40 mg at 08/28/22 0547    propranolol (INDERAL) tablet 10 mg, 10 mg, Oral, BID, Priscilla Campuzano M.D., 10 mg at 08/28/22 0547    Current Outpatient Medications:  Current Facility-Administered Medications   Medication Dose Route Frequency Provider Last Rate Last Admin    NS infusion   Intravenous Continuous Marvin Fitzpatrick M.D. 83 mL/hr at 08/28/22 0926 New Bag at 08/28/22 0926    senna-docusate (PERICOLACE or SENOKOT S) 8.6-50 MG per tablet 2 Tablet  2 Tablet Oral BID Priscilla Campuzano M.D.        And    polyethylene glycol/lytes (MIRALAX) PACKET 1 Packet  1 Packet Oral QDAY PRN Priscilla Campuzano M.D.        And    magnesium hydroxide (MILK OF MAGNESIA) suspension 30 mL  30 mL Oral QDAY PRN Priscilla Campuzano M.D.        And    bisacodyl (DULCOLAX) suppository 10 mg  10 mg Rectal QDAY PRN Priscilla Campuzano M.D.        acetaminophen (Tylenol) tablet 650 mg  650 mg Oral Q6HRS PRN Priscilla Campuzano M.D.        labetalol (NORMODYNE/TRANDATE) injection 10 mg  10 mg Intravenous Q4HRS PRN Priscilla Campuzano M.D.        pantoprazole (Protonix) injection 40 mg  40 mg Intravenous DAILY Priscilla Campuzano M.D.   40 mg at 08/28/22 0547    propranolol (INDERAL) tablet 10 mg  10 mg Oral BID Priscilla Campuzano M.D.   10 mg at 08/28/22 0547       Medication Allergy:  Allergies   Allergen Reactions    Penicillin G Rash     Rash all over    Lavender Oil Rash             Family History:  Family History   Problem Relation Age of Onset    Allergies Mother     Cancer Father         prostate    Diabetes Sister     Heart Disease Sister     Stroke Brother     Stroke Paternal Grandmother     Cancer Paternal Grandfather         throat    No Known Problems Sister     Hyperlipidemia Son     Heart Disease Daughter     Hyperlipidemia Daughter         Social History:  Social History     Socioeconomic History    Marital status:      Spouse name: Not on file    Number of children: Not on file    Years of education: Not on file    Highest education level: Not on file   Occupational History    Not on file   Tobacco Use    Smoking status: Never    Smokeless tobacco: Never   Vaping Use    Vaping Use: Never used   Substance and Sexual Activity    Alcohol use: Not Currently    Drug use: Never    Sexual activity: Not on file   Other Topics Concern    Not on file   Social History Narrative    Not on file     Social Determinants of Health     Financial Resource Strain: Not on file   Food Insecurity: Not on file   Transportation Needs: Not on file   Physical Activity: Not on file   Stress: Not on file   Social Connections: Not on file   Intimate Partner Violence: Not on file   Housing Stability: Not on file         MDM (Data Review):     Records reviewed and summarized in current documentation    Lab Data Review:  Recent Results (from the past 24 hour(s))   COD (ADULT)    Collection Time: 08/27/22  4:10 PM   Result Value Ref Range    ABO Grouping Only A     Rh Grouping Only POS     Antibody Screen-Cod NEG    CBC WITH DIFFERENTIAL    Collection Time: 08/27/22  4:10 PM   Result Value Ref Range    WBC 6.4 4.8 - 10.8 K/uL    RBC 3.81 (L) 4.20 - 5.40 M/uL    Hemoglobin 12.1 12.0 - 16.0 g/dL    Hematocrit 36.8 (L) 37.0 - 47.0 %    MCV 96.6 81.4 - 97.8 fL    MCH 31.8 27.0 - 33.0 pg    MCHC 32.9 (L) 33.6 - 35.0 g/dL    RDW 47.8 35.9 - 50.0 fL    Platelet Count 224 164 - 446 K/uL    MPV 11.9 9.0 - 12.9 fL    Neutrophils-Polys 62.00 44.00 - 72.00 %    Lymphocytes 25.80 22.00 - 41.00 %    Monocytes 10.10 0.00 - 13.40 %    Eosinophils 1.40 0.00 - 6.90 %    Basophils 0.50 0.00 - 1.80 %    Immature Granulocytes 0.20 0.00 - 0.90 %    Nucleated RBC 0.00 /100 WBC    Neutrophils (Absolute) 3.99 2.00 - 7.15 K/uL    Lymphs (Absolute) 1.66 1.00 - 4.80 K/uL    Monos (Absolute) 0.65  0.00 - 0.85 K/uL    Eos (Absolute) 0.09 0.00 - 0.51 K/uL    Baso (Absolute) 0.03 0.00 - 0.12 K/uL    Immature Granulocytes (abs) 0.01 0.00 - 0.11 K/uL    NRBC (Absolute) 0.00 K/uL   COMP METABOLIC PANEL    Collection Time: 08/27/22  4:10 PM   Result Value Ref Range    Sodium 139 135 - 145 mmol/L    Potassium 4.5 3.6 - 5.5 mmol/L    Chloride 104 96 - 112 mmol/L    Co2 26 20 - 33 mmol/L    Anion Gap 9.0 7.0 - 16.0    Glucose 109 (H) 65 - 99 mg/dL    Bun 17 8 - 22 mg/dL    Creatinine 0.61 0.50 - 1.40 mg/dL    Calcium 9.0 8.4 - 10.2 mg/dL    AST(SGOT) 16 12 - 45 U/L    ALT(SGPT) 13 2 - 50 U/L    Alkaline Phosphatase 54 30 - 99 U/L    Total Bilirubin 0.6 0.1 - 1.5 mg/dL    Albumin 4.2 3.2 - 4.9 g/dL    Total Protein 6.7 6.0 - 8.2 g/dL    Globulin 2.5 1.9 - 3.5 g/dL    A-G Ratio 1.7 g/dL   LIPASE    Collection Time: 08/27/22  4:10 PM   Result Value Ref Range    Lipase 23 7 - 58 U/L   PROTHROMBIN TIME    Collection Time: 08/27/22  4:10 PM   Result Value Ref Range    PT 14.0 12.0 - 14.6 sec    INR 1.13 0.87 - 1.13   APTT    Collection Time: 08/27/22  4:10 PM   Result Value Ref Range    APTT 31.1 24.7 - 36.0 sec   ESTIMATED GFR    Collection Time: 08/27/22  4:10 PM   Result Value Ref Range    GFR (CKD-EPI) 93 >60 mL/min/1.73 m 2   ABO Rh Confirm    Collection Time: 08/27/22  8:37 PM   Result Value Ref Range    ABO Rh Confirm A POS    CBC without Differential    Collection Time: 08/28/22  2:32 AM   Result Value Ref Range    WBC 6.2 4.8 - 10.8 K/uL    RBC 3.50 (L) 4.20 - 5.40 M/uL    Hemoglobin 11.2 (L) 12.0 - 16.0 g/dL    Hematocrit 33.8 (L) 37.0 - 47.0 %    MCV 96.6 81.4 - 97.8 fL    MCH 32.0 27.0 - 33.0 pg    MCHC 33.1 (L) 33.6 - 35.0 g/dL    RDW 48.3 35.9 - 50.0 fL    Platelet Count 191 164 - 446 K/uL    MPV 12.0 9.0 - 12.9 fL       Imaging/Procedures Review:      DX-CHEST-PORTABLE (1 VIEW)   Final Result         1. No acute cardiopulmonary abnormalities are identified.           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 No other symptoms other than fatigue. Hgb: 12.1-->11.2. BUN/crt: WNL. Takes Aleve 3-4 times per week for general aches and pains. No history of GI bleed. NO blood thinners, changes in medications.    ASSESSMENT;  Melena  Anemia: mild  HTN    PLAN:  Risks, benefits, and alternatives of EGD were discussed with patient and/or family member(s).  Consenting person(s) were given opportunities to ask questions and discuss other options.  Risks including but not limited to perforation, infection, bleeding, missed lesion(s), possible need for surgery(ies) and/or interventional radiology, possible need for repeat procedure(s) and/or additional testing, hospitalization possibly prolonged, cardiac and/or pulmonary event, aspiration, hypoxia, stroke, medication and/or anesthesia reaction, indefinite diagnosis, discomfort, unsuccessful and/or incomplete procedure, ineffective therapy and/or persistent symptoms, damage to adjacent organs and/or vascular structures, and other adverse events possibly life-threatening.  Interactive discussion was undertaken with Layman's terms. I answered questions in full and to satisfaction.  Consenting person(s) stated understanding and acceptance of these risks, and wished to proceed.  Informed consent was given in clear state of mind.        Continue Protonix 40 mg IV daily  Regular diet then NPO after midnight  Trend Hgb and  transfuse >7     Thank your for the opportunity to assist in the care of your patient.  Please call for any questions or concerns.    Kat Miguel A.P.R.N.  ===  I have seen and examined the patient today.  I have reviewed the medical record, laboratory data, imaging, and all relevant studies.  I have discussed the assessment and plan of care with Kat ORTEGA and agree with their note and plan of care as documented.   Jarrell Verdugo M.D.

## 2022-08-28 NOTE — CARE PLAN
The patient is Stable - Low risk of patient condition declining or worsening    Shift Goals  Clinical Goals: Pt will experience adequate pain control rating 5/10 or less after pain medication administration through end of shift  Patient Goals: Pt will be able to rest with minimal interruptions through end of shift    Progress made toward(s) clinical / shift goals:  Patient was adequately comfortable throughout the shift,  She had no pain that required any medication.  Patient was able to rest with minimal interruptions throughout shift.  Cares were completed at scheduled times and when patient called.    Patient is not progressing towards the following goals:

## 2022-08-28 NOTE — H&P
Hospital Medicine History & Physical Note    Date of Service  8/27/2022    Primary Care Physician  Farhana Quintanilla D.O.    Code Status  Full Code    Chief Complaint  Chief Complaint   Patient presents with    Bloody Stools       History of Presenting Illness  Cherelle Crystal is a 77 y.o. female who presented 8/27/2022 with melena.  Has had diarrhea with black tarry stools for the past 3 days.  Denies bright red blood per rectum, hematemesis, no significant abdominal pain.  She takes NSAIDs occasionally, only 2-3 times in this past week.  She has never had a colonoscopy before.  Has never had GI bleed in the past.    In the ED afebrile, hemodynamically stable.    I discussed the plan of care with patient, bedside RN, and EDP .    Review of Systems  Review of Systems   Constitutional:  Negative for chills and fever.   HENT:  Negative for sore throat.    Respiratory:  Negative for cough and shortness of breath.    Cardiovascular:  Negative for chest pain.   Gastrointestinal:  Positive for melena. Negative for abdominal pain, diarrhea, nausea and vomiting.   Genitourinary:  Negative for dysuria and urgency.   Neurological:  Negative for dizziness and headaches.   All other systems reviewed and are negative.    Past Medical History   has a past medical history of Hyperlipidemia and Leg paresthesia (6/14/2021).    Surgical History   has no past surgical history on file.     Family History  family history includes Allergies in her mother; Cancer in her father and paternal grandfather; Diabetes in her sister; Heart Disease in her daughter and sister; Hyperlipidemia in her daughter and son; No Known Problems in her sister; Stroke in her brother and paternal grandmother.   Family history reviewed with patient. There is no family history that is pertinent to the chief complaint.     Social History   reports that she has never smoked. She has never used smokeless tobacco. She reports that she does not currently use  alcohol. She reports that she does not use drugs.    Allergies  Allergies   Allergen Reactions    Penicillin G Rash     Rash all over    Lavender Oil Rash             Medications  Prior to Admission Medications   Prescriptions Last Dose Informant Patient Reported? Taking?   Ascorbic Acid (VITAMIN C PO) 8/24/2022 at PM Patient Yes Yes   Sig: Take 1 Tablet by mouth every evening.   Cholecalciferol (VITAMIN D3 PO) 8/24/2022 at PM Patient Yes Yes   Sig: Take 1 Capsule by mouth every evening.   Mirabegron ER (MYRBETRIQ) 25 MG TABLET SR 24 HR 8/26/2022 at 1900 Patient Yes Yes   Sig: Take 25 mg by mouth every evening.   VITAMIN E PO 8/24/2022 at PM Patient Yes Yes   Sig: Take 1 Capsule by mouth every evening.   albuterol 108 (90 Base) MCG/ACT Aero Soln inhalation aerosol > 1 MONTH at PRN Patient Yes Yes   Sig: Inhale 1-2 Puffs every 6 hours as needed for Shortness of Breath.   amphetamine-dextroamphetamine (ADDERALL, 15MG,) 15 MG tablet 8/25/2022 at AM Patient Yes Yes   Sig: Take 15 mg by mouth 2 times a day as needed (on work days).   asa/apap/caffeine (EXCEDRIN) 250-250-65 MG Tab 8/25/2022 at PRN Patient Yes Yes   Sig: Take 1 Tablet by mouth every 6 hours as needed for Headache.   naproxen (ALEVE) 220 MG tablet 8/23/2022 at PRN Patient Yes Yes   Sig: Take 220 mg by mouth 2 times a day as needed (Mild Pain). Take with food.   propranolol (INDERAL) 10 MG Tab 8/27/2022 at 1000 Patient Yes Yes   Sig: Take 10 mg by mouth 2 times a day.      Facility-Administered Medications: None       Physical Exam  Temp:  [36.8 °C (98.2 °F)-37 °C (98.6 °F)] 37 °C (98.6 °F)  Pulse:  [73-82] 73  Resp:  [16-18] 18  BP: (123-130)/(64-68) 123/64  SpO2:  [97 %] 97 %  Blood Pressure : 123/64   Temperature: 37 °C (98.6 °F)   Pulse: 73   Respiration: 18   Pulse Oximetry: 97 %       Physical Exam  Constitutional:       Appearance: Normal appearance.   HENT:      Head: Normocephalic and atraumatic.      Mouth/Throat:      Mouth: Mucous membranes are  moist.      Pharynx: No oropharyngeal exudate or posterior oropharyngeal erythema.   Eyes:      General: No scleral icterus.  Cardiovascular:      Rate and Rhythm: Normal rate and regular rhythm.      Pulses: Normal pulses.      Heart sounds: Normal heart sounds. No murmur heard.  Pulmonary:      Effort: Pulmonary effort is normal. No respiratory distress.      Breath sounds: Normal breath sounds.   Abdominal:      General: There is no distension.      Palpations: Abdomen is soft.      Tenderness: There is no abdominal tenderness.   Musculoskeletal:         General: No swelling or tenderness. Normal range of motion.      Cervical back: Normal range of motion and neck supple.   Skin:     General: Skin is warm and dry.   Neurological:      General: No focal deficit present.      Mental Status: She is alert and oriented to person, place, and time.   Psychiatric:         Mood and Affect: Mood normal.       Laboratory:  Recent Labs     08/27/22  1610   WBC 6.4   RBC 3.81*   HEMOGLOBIN 12.1   HEMATOCRIT 36.8*   MCV 96.6   MCH 31.8   MCHC 32.9*   RDW 47.8   PLATELETCT 224   MPV 11.9     Recent Labs     08/27/22  1610   SODIUM 139   POTASSIUM 4.5   CHLORIDE 104   CO2 26   GLUCOSE 109*   BUN 17   CREATININE 0.61   CALCIUM 9.0     Recent Labs     08/27/22  1610   ALTSGPT 13   ASTSGOT 16   ALKPHOSPHAT 54   TBILIRUBIN 0.6   LIPASE 23   GLUCOSE 109*     Recent Labs     08/27/22  1610   APTT 31.1   INR 1.13     No results for input(s): NTPROBNP in the last 72 hours.      No results for input(s): TROPONINT in the last 72 hours.    Imaging:  DX-CHEST-PORTABLE (1 VIEW)   Final Result         1. No acute cardiopulmonary abnormalities are identified.          X-Ray:  I have personally reviewed the images and compared with prior images. and My impression is: No acute process    Assessment/Plan:  Justification for Admission Status  I anticipate this patient is appropriate for observation status at this time because melena, stable vitals  and labs, will need monitoring    Patient will need a Med/Surg bed on MEDICAL service .  The need is secondary to melena.    * Melena- (present on admission)  Assessment & Plan  Has had melena for the past 3 days.  Seen on exam in the ED, stool occult positive per EDP  Hemodynamically stable, labs stable at this time  IV PPI  Will admit for observation and monitoring, GI consult in the morning if hemoglobin is worse      Urgency incontinence- (present on admission)  Assessment & Plan  Continue mirabegron      Essential tremor- (present on admission)  Assessment & Plan  Continue propranolol      VTE prophylaxis: SCDs/TEDs

## 2022-08-28 NOTE — ED NOTES
Attempted to give report, RN reports theres nobody assigned.  Awaiting for RN assignment to transfer pt up.

## 2022-08-28 NOTE — ED NOTES
Med rec completed per patient at bedside.  Allergies reviewed with patient.  No outpatient antibiotics in the last 30 days.  Patient's preferred pharmacy: [facility] at [address].

## 2022-08-28 NOTE — PROGRESS NOTES
4 Eyes Skin Assessment Completed by RAFAT Estrada and RAFAT Lopez.    Head WDL  Ears WDL  Nose WDL  Mouth WDL  Neck WDL  Breast/Chest WDL  Shoulder Blades WDL  Spine WDL  (R) Arm/Elbow/Hand WDL  (L) Arm/Elbow/Hand WDL  Abdomen WDL  Groin WDL  Scrotum/Coccyx/Buttocks WDL  (R) Leg WDL  (L) Leg WDL  (R) Heel/Foot/Toe WDL  (L) Heel/Foot/Toe WDL          Devices In Places Blood Pressure Cuff      Interventions In Place Pressure Redistribution Mattress    Possible Skin Injury No    Pictures Uploaded Into Epic N/A  Wound Consult Placed N/A  RN Wound Prevention Protocol Ordered No

## 2022-08-28 NOTE — ED PROVIDER NOTES
ED Provider Note    CHIEF COMPLAINT  Chief Complaint   Patient presents with    Bloody Stools       HPI  Cherelle Crystal is a 77 y.o. female who presents to the emergency department with dark stools.  Patient states symptoms started about 3 days ago.  She states she has had multiple episodes of loose stools that are dark black in color.  She is also noticed a small amount of bright red blood mixed in with this.  She is had some mild lower abdominal discomfort however denies any current complaints of pain.  She is not had any vomiting or lightheadedness.  She is not on any aspirin or blood thinners.  She is not been taking any Pepto-Bismol or iron supplementation.  She states she does take Aleve about 3-4 times a week for muscle aches.  She uses alcohol in moderation.    REVIEW OF SYSTEMS  See HPI for further details.   Review of Systems   Constitutional:  Negative for chills and fever.   HENT:  Negative for sore throat.    Eyes:  Negative for blurred vision and redness.   Respiratory:  Negative for cough and shortness of breath.    Cardiovascular:  Negative for chest pain and leg swelling.   Gastrointestinal:  Positive for blood in stool and melena. Negative for abdominal pain and vomiting.   Genitourinary:  Negative for dysuria and urgency.   Musculoskeletal:  Negative for back pain and neck pain.   Skin:  Negative for rash.   Neurological:  Negative for focal weakness, seizures and headaches.   Psychiatric/Behavioral:  Negative for suicidal ideas.        PAST MEDICAL HISTORY   has a past medical history of Hyperlipidemia and Leg paresthesia (6/14/2021).    SOCIAL HISTORY  Social History     Tobacco Use    Smoking status: Never    Smokeless tobacco: Never   Vaping Use    Vaping Use: Never used   Substance and Sexual Activity    Alcohol use: Not Currently    Drug use: Never    Sexual activity: Not on file       SURGICAL HISTORY  patient denies any surgical history    CURRENT MEDICATIONS  Home Medications        "Reviewed by Carroll Betancourt PhT (Pharmacy Tech) on 08/27/22 at 1739  Med List Status: Complete     Medication Last Dose Status   albuterol 108 (90 Base) MCG/ACT Aero Soln inhalation aerosol > 1 MONTH Active   amphetamine-dextroamphetamine (ADDERALL, 15MG,) 15 MG tablet 8/25/2022 Active   asa/apap/caffeine (EXCEDRIN) 250-250-65 MG Tab 8/25/2022 Active   Ascorbic Acid (VITAMIN C) 1000 MG Tab 8/24/2022 Active   Cholecalciferol (VITAMIN D3 PO) 8/24/2022 Active   Mirabegron ER (MYRBETRIQ) 25 MG TABLET SR 24 HR 8/26/2022 Active   naproxen (ALEVE) 220 MG tablet 8/23/2022 Active   propranolol (INDERAL) 10 MG Tab 8/27/2022 Active   VITAMIN E PO 8/24/2022 Active                    ALLERGIES  Allergies   Allergen Reactions    Penicillin G Rash     Rash all over    Lavender Oil Rash             PHYSICAL EXAM   VITAL SIGNS: /64   Pulse 73   Temp 37 °C (98.6 °F) (Temporal)   Resp 18   Ht 1.575 m (5' 2\")   Wt 67.8 kg (149 lb 7.6 oz)   SpO2 97%   BMI 27.34 kg/m²      Physical Exam  Constitutional:       General: She is not in acute distress.     Comments: Nontoxic-appearing older female   HENT:      Head: Normocephalic and atraumatic.   Eyes:      Conjunctiva/sclera: Conjunctivae normal.      Pupils: Pupils are equal, round, and reactive to light.   Cardiovascular:      Rate and Rhythm: Normal rate and regular rhythm.      Heart sounds: Normal heart sounds.   Pulmonary:      Effort: Pulmonary effort is normal. No respiratory distress.      Breath sounds: Normal breath sounds.   Abdominal:      General: There is no distension.      Palpations: Abdomen is soft.      Tenderness: There is no abdominal tenderness.   Genitourinary:     Rectum: Guaiac result positive.      Comments: Gross melena on exam  Musculoskeletal:         General: No tenderness. Normal range of motion.      Cervical back: Normal range of motion and neck supple.   Skin:     General: Skin is warm and dry.   Neurological:      Mental Status: She is alert " "and oriented to person, place, and time.      Comments: Moving all extremities spontaneously   Psychiatric:         Mood and Affect: Mood and affect normal.         Behavior: Behavior normal.         DIAGNOSTIC STUDIES      LABS  Personally reviewed by me  Labs Reviewed   CBC WITH DIFFERENTIAL - Abnormal; Notable for the following components:       Result Value    RBC 3.81 (*)     Hematocrit 36.8 (*)     MCHC 32.9 (*)     All other components within normal limits    Narrative:     Indicate which anticoagulants the patient is on:->UNKNOWN   COMP METABOLIC PANEL - Abnormal; Notable for the following components:    Glucose 109 (*)     All other components within normal limits    Narrative:     Indicate which anticoagulants the patient is on:->UNKNOWN   COD (ADULT)   LIPASE    Narrative:     Indicate which anticoagulants the patient is on:->UNKNOWN   PROTHROMBIN TIME    Narrative:     Indicate which anticoagulants the patient is on:->UNKNOWN   APTT    Narrative:     Indicate which anticoagulants the patient is on:->UNKNOWN   ESTIMATED GFR    Narrative:     Indicate which anticoagulants the patient is on:->UNKNOWN   ABO RH CONFIRM           RADIOLOGY  Personally reviewed by me  DX-CHEST-PORTABLE (1 VIEW)   Final Result         1. No acute cardiopulmonary abnormalities are identified.              ED COURSE  Vitals:    08/27/22 1501   BP: 123/64   Pulse: 73   Resp: 18   Temp: 37 °C (98.6 °F)   TempSrc: Temporal   SpO2: 97%   Weight: 67.8 kg (149 lb 7.6 oz)   Height: 1.575 m (5' 2\")         Medications administered:  pantoprazole    MEDICAL DECISION MAKING  Relatively healthy older female who presents with 3-day history of loose black and bloody stools.  She is afebrile with normal vital signs on arrival.  Her abdominal exam is completely benign without concern for underlying surgical process such as bowel obstruction, perforation, appendicitis, diverticulitis.  She does have gross melena on exam.  Her labs are reassuring " with a stable hemoglobin without significant anemia.  There is no coagulopathy.  She is not on any blood thinners or aspirin.  She does take some Aleve with moderate alcohol use which may be contributing to upper GI bleed.  Patient will be treated with pantoprazole and admitted for GI consultation in the morning for likely endoscopy.    I discussed the case with Dr. Campuzano, hospitalist on-call, who accepts admission of the patient.  Patient was updated with plan of care and agreeable        IMPRESSION  (K92.2) UGIB (upper gastrointestinal bleed)    Disposition: Admit Medicine, stable condition  Results, diagnoses, and treatment options were discussed with the patient and/or family. Patient verbalized understanding of plan of care.    Patient referred to primary care provider for monitoring and treatment of blood pressure.      New Prescriptions    No medications on file           Electronically signed by: Gwendolyn Doshi M.D., 8/27/2022 5:14 PM

## 2022-08-28 NOTE — ASSESSMENT & PLAN NOTE
Has had melena for the past 3 days.  Seen on exam in the ED, stool occult positive per EDP  Hemodynamically stable, labs stable at this time  IV PPI  Gastroenterology consulted, Dr Jarrell Verdugo, EGD on 8/29, will plan for colonoscopy tomorrow.

## 2022-08-28 NOTE — PROGRESS NOTES
[facility] Medicine Daily Progress Note    Date of Service  8/28/2022    Chief Complaint  Cherelle Crystal is a 77 y.o. female admitted 8/27/2022 with melena    Hospital Course  Cherelle Crystal is a 77 y.o. female with no significant past medical history other than essential tremor and urge incontinence admitted 8/27/2022 with melena.  Gastroenterology, Dr Jarrell Verdugo, will see the patient, will likely need a scope.      Interval Problem Update  Hemodynamically stable overnight  No bowel movement overnight  I consulted gastroenterology, Dr Jarrell Verdugo, will see the patient, will likely need a scope.  Hemoglobin 11.2, down from 12.1.     Platelet count stable 191-224.  Discussed with patient, patient's nurse and with multidisciplinary team during rounds including , pharmacist and charge nurse.      Consultants/Specialty  Gastroenterology, Dr Jarrell Verdugo    Code Status  Full Code    Disposition  Patient is not medically cleared for discharge.   Anticipate discharge to to home with close outpatient follow-up.    Review of Systems  Review of Systems   Constitutional:  Negative for fever and malaise/fatigue.   Eyes:  Negative for discharge.   Respiratory:  Negative for shortness of breath and stridor.    Cardiovascular:  Negative for chest pain.   Gastrointestinal:  Positive for melena. Negative for abdominal pain, nausea and vomiting.   Genitourinary:  Negative for dysuria and urgency.   Musculoskeletal:  Negative for joint pain.   Neurological:  Negative for dizziness and headaches.   Endo/Heme/Allergies:  Does not bruise/bleed easily.   Psychiatric/Behavioral:  The patient is not nervous/anxious.       Physical Exam  Temp:  [36.6 °C (97.8 °F)-37 °C (98.6 °F)] 36.6 °C (97.8 °F)  Pulse:  [64-82] 68  Resp:  [16-20] 18  BP: (123-150)/(53-85) 123/54  SpO2:  [91 %-98 %] 95 %    Physical Exam  Constitutional:       General: She is not in acute distress.  HENT:      Head: Normocephalic and atraumatic.       Right Ear: External ear normal.      Left Ear: External ear normal.      Nose: No congestion or rhinorrhea.      Mouth/Throat:      Mouth: Mucous membranes are dry.      Pharynx: No oropharyngeal exudate or posterior oropharyngeal erythema.   Eyes:      General: No scleral icterus.        Right eye: No discharge.         Left eye: No discharge.      Conjunctiva/sclera: Conjunctivae normal.      Pupils: Pupils are equal, round, and reactive to light.   Cardiovascular:      Heart sounds:     No friction rub. No gallop.   Pulmonary:      Effort: Pulmonary effort is normal.   Abdominal:      General: Abdomen is flat. There is no distension.      Tenderness: There is no guarding.   Musculoskeletal:         General: No swelling.      Cervical back: Neck supple. No rigidity. No muscular tenderness.      Right lower leg: No edema.      Left lower leg: No edema.   Skin:     General: Skin is dry.      Capillary Refill: Capillary refill takes 2 to 3 seconds.      Coloration: Skin is not jaundiced or pale.      Findings: No bruising or erythema.   Neurological:      Mental Status: She is alert and oriented to person, place, and time.   Psychiatric:         Mood and Affect: Mood normal.         Judgment: Judgment normal.     Fluids  No intake or output data in the 24 hours ending 08/28/22 1326    Laboratory  Recent Labs     08/27/22  1610 08/28/22  0232   WBC 6.4 6.2   RBC 3.81* 3.50*   HEMOGLOBIN 12.1 11.2*   HEMATOCRIT 36.8* 33.8*   MCV 96.6 96.6   MCH 31.8 32.0   MCHC 32.9* 33.1*   RDW 47.8 48.3   PLATELETCT 224 191   MPV 11.9 12.0     Recent Labs     08/27/22  1610   SODIUM 139   POTASSIUM 4.5   CHLORIDE 104   CO2 26   GLUCOSE 109*   BUN 17   CREATININE 0.61   CALCIUM 9.0     Recent Labs     08/27/22  1610   APTT 31.1   INR 1.13     Imaging  DX-CHEST-PORTABLE (1 VIEW)   Final Result         1. No acute cardiopulmonary abnormalities are identified.         Assessment/Plan  * Melena- (present on admission)  Assessment &  Plan  Has had melena for the past 3 days.  Seen on exam in the ED, stool occult positive per EDP  Hemodynamically stable, labs stable at this time  IV PPI  I consulted gastroenterology, Dr Jarrell Verdugo, will see the patient, plan for a scope     Urgency incontinence- (present on admission)  Assessment & Plan  Continue mirabegron     Essential tremor- (present on admission)  Assessment & Plan  Continue propranolol      VTE prophylaxis: SCDs/TEDs, GI bleeding

## 2022-08-29 ENCOUNTER — ANESTHESIA (OUTPATIENT)
Dept: SURGERY | Facility: MEDICAL CENTER | Age: 77
DRG: 378 | End: 2022-08-29
Payer: MEDICARE

## 2022-08-29 ENCOUNTER — ANESTHESIA EVENT (OUTPATIENT)
Dept: SURGERY | Facility: MEDICAL CENTER | Age: 77
DRG: 378 | End: 2022-08-29
Payer: MEDICARE

## 2022-08-29 LAB
ANION GAP SERPL CALC-SCNC: 8 MMOL/L (ref 7–16)
BUN SERPL-MCNC: 12 MG/DL (ref 8–22)
CALCIUM SERPL-MCNC: 8.4 MG/DL (ref 8.4–10.2)
CHLORIDE SERPL-SCNC: 109 MMOL/L (ref 96–112)
CO2 SERPL-SCNC: 24 MMOL/L (ref 20–33)
CREAT SERPL-MCNC: 0.55 MG/DL (ref 0.5–1.4)
ERYTHROCYTE [DISTWIDTH] IN BLOOD BY AUTOMATED COUNT: 48.2 FL (ref 35.9–50)
ERYTHROCYTE [DISTWIDTH] IN BLOOD BY AUTOMATED COUNT: 48.6 FL (ref 35.9–50)
GFR SERPLBLD CREATININE-BSD FMLA CKD-EPI: 95 ML/MIN/1.73 M 2
GLUCOSE SERPL-MCNC: 94 MG/DL (ref 65–99)
HCT VFR BLD AUTO: 31.3 % (ref 37–47)
HCT VFR BLD AUTO: 34.1 % (ref 37–47)
HGB BLD-MCNC: 10.3 G/DL (ref 12–16)
HGB BLD-MCNC: 11.1 G/DL (ref 12–16)
MCH RBC QN AUTO: 31.9 PG (ref 27–33)
MCH RBC QN AUTO: 32 PG (ref 27–33)
MCHC RBC AUTO-ENTMCNC: 32.6 G/DL (ref 33.6–35)
MCHC RBC AUTO-ENTMCNC: 32.9 G/DL (ref 33.6–35)
MCV RBC AUTO: 97.2 FL (ref 81.4–97.8)
MCV RBC AUTO: 98 FL (ref 81.4–97.8)
PATHOLOGY CONSULT NOTE: NORMAL
PATHOLOGY CONSULT NOTE: NORMAL
PLATELET # BLD AUTO: 186 K/UL (ref 164–446)
PLATELET # BLD AUTO: 207 K/UL (ref 164–446)
PMV BLD AUTO: 11.7 FL (ref 9–12.9)
PMV BLD AUTO: 12.2 FL (ref 9–12.9)
POTASSIUM SERPL-SCNC: 3.8 MMOL/L (ref 3.6–5.5)
RBC # BLD AUTO: 3.22 M/UL (ref 4.2–5.4)
RBC # BLD AUTO: 3.48 M/UL (ref 4.2–5.4)
SODIUM SERPL-SCNC: 141 MMOL/L (ref 135–145)
WBC # BLD AUTO: 5.1 K/UL (ref 4.8–10.8)
WBC # BLD AUTO: 5.1 K/UL (ref 4.8–10.8)

## 2022-08-29 PROCEDURE — 99233 SBSQ HOSP IP/OBS HIGH 50: CPT | Performed by: INTERNAL MEDICINE

## 2022-08-29 PROCEDURE — 700111 HCHG RX REV CODE 636 W/ 250 OVERRIDE (IP): Performed by: INTERNAL MEDICINE

## 2022-08-29 PROCEDURE — 85027 COMPLETE CBC AUTOMATED: CPT | Mod: 91

## 2022-08-29 PROCEDURE — 160002 HCHG RECOVERY MINUTES (STAT): Performed by: INTERNAL MEDICINE

## 2022-08-29 PROCEDURE — 700111 HCHG RX REV CODE 636 W/ 250 OVERRIDE (IP): Performed by: EMERGENCY MEDICINE

## 2022-08-29 PROCEDURE — 36415 COLL VENOUS BLD VENIPUNCTURE: CPT

## 2022-08-29 PROCEDURE — 160202 HCHG ENDO MINUTES - 1ST 30 MINS LEVEL 3: Performed by: INTERNAL MEDICINE

## 2022-08-29 PROCEDURE — 700101 HCHG RX REV CODE 250: Performed by: EMERGENCY MEDICINE

## 2022-08-29 PROCEDURE — 700102 HCHG RX REV CODE 250 W/ 637 OVERRIDE(OP): Performed by: INTERNAL MEDICINE

## 2022-08-29 PROCEDURE — 160048 HCHG OR STATISTICAL LEVEL 1-5: Performed by: INTERNAL MEDICINE

## 2022-08-29 PROCEDURE — A9270 NON-COVERED ITEM OR SERVICE: HCPCS | Performed by: STUDENT IN AN ORGANIZED HEALTH CARE EDUCATION/TRAINING PROGRAM

## 2022-08-29 PROCEDURE — 88305 TISSUE EXAM BY PATHOLOGIST: CPT

## 2022-08-29 PROCEDURE — 99100 ANES PT EXTEME AGE<1 YR&>70: CPT | Performed by: EMERGENCY MEDICINE

## 2022-08-29 PROCEDURE — 700102 HCHG RX REV CODE 250 W/ 637 OVERRIDE(OP): Performed by: STUDENT IN AN ORGANIZED HEALTH CARE EDUCATION/TRAINING PROGRAM

## 2022-08-29 PROCEDURE — 00811 ANES LWR INTST NDSC NOS: CPT | Performed by: EMERGENCY MEDICINE

## 2022-08-29 PROCEDURE — 94760 N-INVAS EAR/PLS OXIMETRY 1: CPT

## 2022-08-29 PROCEDURE — 88312 SPECIAL STAINS GROUP 1: CPT

## 2022-08-29 PROCEDURE — 770006 HCHG ROOM/CARE - MED/SURG/GYN SEMI*

## 2022-08-29 PROCEDURE — 700105 HCHG RX REV CODE 258: Performed by: INTERNAL MEDICINE

## 2022-08-29 PROCEDURE — 700105 HCHG RX REV CODE 258: Performed by: HOSPITALIST

## 2022-08-29 PROCEDURE — 160009 HCHG ANES TIME/MIN: Performed by: INTERNAL MEDICINE

## 2022-08-29 PROCEDURE — A9270 NON-COVERED ITEM OR SERVICE: HCPCS | Performed by: INTERNAL MEDICINE

## 2022-08-29 PROCEDURE — 80048 BASIC METABOLIC PNL TOTAL CA: CPT

## 2022-08-29 PROCEDURE — C9113 INJ PANTOPRAZOLE SODIUM, VIA: HCPCS | Performed by: INTERNAL MEDICINE

## 2022-08-29 PROCEDURE — 0DB68ZX EXCISION OF STOMACH, VIA NATURAL OR ARTIFICIAL OPENING ENDOSCOPIC, DIAGNOSTIC: ICD-10-PCS | Performed by: INTERNAL MEDICINE

## 2022-08-29 PROCEDURE — 160035 HCHG PACU - 1ST 60 MINS PHASE I: Performed by: INTERNAL MEDICINE

## 2022-08-29 RX ORDER — OMEPRAZOLE 20 MG/1
20 CAPSULE, DELAYED RELEASE ORAL DAILY
Status: DISCONTINUED | OUTPATIENT
Start: 2022-08-29 | End: 2022-08-30 | Stop reason: HOSPADM

## 2022-08-29 RX ORDER — PEG-3350, SODIUM SULFATE, SODIUM CHLORIDE, POTASSIUM CHLORIDE, SODIUM ASCORBATE AND ASCORBIC ACID 7.5-2.691G
100 KIT ORAL 2 TIMES DAILY
Status: COMPLETED | OUTPATIENT
Start: 2022-08-29 | End: 2022-08-29

## 2022-08-29 RX ORDER — ONDANSETRON 2 MG/ML
4 INJECTION INTRAMUSCULAR; INTRAVENOUS
Status: DISCONTINUED | OUTPATIENT
Start: 2022-08-29 | End: 2022-08-29 | Stop reason: HOSPADM

## 2022-08-29 RX ORDER — DIPHENHYDRAMINE HYDROCHLORIDE 50 MG/ML
12.5 INJECTION INTRAMUSCULAR; INTRAVENOUS
Status: DISCONTINUED | OUTPATIENT
Start: 2022-08-29 | End: 2022-08-29 | Stop reason: HOSPADM

## 2022-08-29 RX ORDER — LABETALOL HYDROCHLORIDE 5 MG/ML
5 INJECTION, SOLUTION INTRAVENOUS
Status: DISCONTINUED | OUTPATIENT
Start: 2022-08-29 | End: 2022-08-29 | Stop reason: HOSPADM

## 2022-08-29 RX ORDER — HALOPERIDOL 5 MG/ML
1 INJECTION INTRAMUSCULAR
Status: DISCONTINUED | OUTPATIENT
Start: 2022-08-29 | End: 2022-08-29 | Stop reason: HOSPADM

## 2022-08-29 RX ORDER — SODIUM CHLORIDE, SODIUM LACTATE, POTASSIUM CHLORIDE, CALCIUM CHLORIDE 600; 310; 30; 20 MG/100ML; MG/100ML; MG/100ML; MG/100ML
INJECTION, SOLUTION INTRAVENOUS CONTINUOUS
Status: ACTIVE | OUTPATIENT
Start: 2022-08-29 | End: 2022-08-29

## 2022-08-29 RX ORDER — SODIUM CHLORIDE, SODIUM LACTATE, POTASSIUM CHLORIDE, AND CALCIUM CHLORIDE .6; .31; .03; .02 G/100ML; G/100ML; G/100ML; G/100ML
500 INJECTION, SOLUTION INTRAVENOUS
Status: DISCONTINUED | OUTPATIENT
Start: 2022-08-29 | End: 2022-08-29 | Stop reason: HOSPADM

## 2022-08-29 RX ORDER — MEPERIDINE HYDROCHLORIDE 25 MG/ML
6.25 INJECTION INTRAMUSCULAR; INTRAVENOUS; SUBCUTANEOUS
Status: DISCONTINUED | OUTPATIENT
Start: 2022-08-29 | End: 2022-08-29 | Stop reason: HOSPADM

## 2022-08-29 RX ORDER — HYDRALAZINE HYDROCHLORIDE 20 MG/ML
5 INJECTION INTRAMUSCULAR; INTRAVENOUS
Status: DISCONTINUED | OUTPATIENT
Start: 2022-08-29 | End: 2022-08-29 | Stop reason: HOSPADM

## 2022-08-29 RX ORDER — LIDOCAINE HYDROCHLORIDE 20 MG/ML
INJECTION, SOLUTION EPIDURAL; INFILTRATION; INTRACAUDAL; PERINEURAL PRN
Status: DISCONTINUED | OUTPATIENT
Start: 2022-08-29 | End: 2022-08-29 | Stop reason: SURG

## 2022-08-29 RX ORDER — ALBUTEROL SULFATE 2.5 MG/3ML
2.5 SOLUTION RESPIRATORY (INHALATION)
Status: DISCONTINUED | OUTPATIENT
Start: 2022-08-29 | End: 2022-08-29 | Stop reason: HOSPADM

## 2022-08-29 RX ADMIN — POLYETHYLENE GLYCOL 3350, SODIUM SULFATE, SODIUM CHLORIDE, POTASSIUM CHLORIDE, SODIUM ASCORBATE, AND ASCORBIC ACID 100 G: KIT at 15:51

## 2022-08-29 RX ADMIN — PROPOFOL 30 MG: 10 INJECTION, EMULSION INTRAVENOUS at 09:43

## 2022-08-29 RX ADMIN — LIDOCAINE HYDROCHLORIDE 100 MG: 20 INJECTION, SOLUTION EPIDURAL; INFILTRATION; INTRACAUDAL at 09:37

## 2022-08-29 RX ADMIN — SODIUM CHLORIDE, POTASSIUM CHLORIDE, SODIUM LACTATE AND CALCIUM CHLORIDE: 600; 310; 30; 20 INJECTION, SOLUTION INTRAVENOUS at 09:33

## 2022-08-29 RX ADMIN — PROPRANOLOL HYDROCHLORIDE 10 MG: 20 TABLET ORAL at 05:18

## 2022-08-29 RX ADMIN — PANTOPRAZOLE SODIUM 40 MG: 40 INJECTION, POWDER, FOR SOLUTION INTRAVENOUS at 05:18

## 2022-08-29 RX ADMIN — PROPOFOL 100 MG: 10 INJECTION, EMULSION INTRAVENOUS at 09:37

## 2022-08-29 RX ADMIN — PROPRANOLOL HYDROCHLORIDE 10 MG: 20 TABLET ORAL at 17:21

## 2022-08-29 RX ADMIN — POLYETHYLENE GLYCOL 3350, SODIUM SULFATE, SODIUM CHLORIDE, POTASSIUM CHLORIDE, SODIUM ASCORBATE, AND ASCORBIC ACID 100 G: KIT at 23:33

## 2022-08-29 RX ADMIN — SODIUM CHLORIDE: 9 INJECTION, SOLUTION INTRAVENOUS at 12:45

## 2022-08-29 RX ADMIN — OMEPRAZOLE 20 MG: 20 CAPSULE, DELAYED RELEASE ORAL at 12:42

## 2022-08-29 ASSESSMENT — COGNITIVE AND FUNCTIONAL STATUS - GENERAL
DAILY ACTIVITIY SCORE: 24
SUGGESTED CMS G CODE MODIFIER MOBILITY: CH
SUGGESTED CMS G CODE MODIFIER DAILY ACTIVITY: CH
MOBILITY SCORE: 24

## 2022-08-29 ASSESSMENT — FIBROSIS 4 INDEX: FIB4 SCORE: 1.63

## 2022-08-29 ASSESSMENT — ENCOUNTER SYMPTOMS
VOMITING: 0
STRIDOR: 0
EYE DISCHARGE: 0
NERVOUS/ANXIOUS: 0
BRUISES/BLEEDS EASILY: 0
FEVER: 0
DIZZINESS: 0
NAUSEA: 0
SHORTNESS OF BREATH: 0
HEADACHES: 0
ABDOMINAL PAIN: 0

## 2022-08-29 ASSESSMENT — PATIENT HEALTH QUESTIONNAIRE - PHQ9
2. FEELING DOWN, DEPRESSED, IRRITABLE, OR HOPELESS: NOT AT ALL
1. LITTLE INTEREST OR PLEASURE IN DOING THINGS: NOT AT ALL
SUM OF ALL RESPONSES TO PHQ9 QUESTIONS 1 AND 2: 0

## 2022-08-29 ASSESSMENT — PAIN SCALES - GENERAL: PAIN_LEVEL: 0

## 2022-08-29 ASSESSMENT — PAIN DESCRIPTION - PAIN TYPE: TYPE: ACUTE PAIN

## 2022-08-29 NOTE — CARE PLAN
The patient is Stable - Low risk of patient condition declining or worsening    Shift Goals Prep for EGD  Clinical Goals: Monitor H/H  Patient Goals: Sleep comfortably before procedure tomorrow    Progress made toward(s) clinical / shift goals:    Problem: Knowledge Deficit - Standard  Goal: Patient and family/care givers will demonstrate understanding of plan of care, disease process/condition, diagnostic tests and medications  Outcome: Progressing     Problem: Risk for Fluid Volume Deficit Related to Bleeding  Goal: Fluid volume balance will be maintained  Outcome: Progressing  Goal: Patient will show no signs and symptoms of excessive bleeding  Outcome: Progressing       Patient is not progressing towards the following goals:

## 2022-08-29 NOTE — PROGRESS NOTES
4 Eyes Skin Assessment Completed by Franklin VILLEGAS RN and Zayra MARKS RN.    Head WDL  Ears WDL  Nose WDL  Mouth WDL  Neck WDL  Breast/Chest WDL  Shoulder Blades WDL  Spine WDL  (R) Arm/Elbow/Hand WDL  (L) Arm/Elbow/Hand WDL  Abdomen WDL  Groin WDL  Scrotum/Coccyx/Buttocks WDL  (R) Leg Scab  (L) Leg Scab  (R) Heel/Foot/Toe WDL  (L) Heel/Foot/Toe WDL          Devices In Places Pulse Ox      Interventions In Place N/A    Possible Skin Injury No    Pictures Uploaded Into Epic N/A  Wound Consult Placed N/A  RN Wound Prevention Protocol Ordered No

## 2022-08-29 NOTE — PROCEDURES
DATE OF PROCEDURE:  08/29/2022     PROCEDURE:  Upper endoscopy with biopsies.     :  Rich Agee MD     INDICATIONS:  The patient is a 77-year-old female who presented for evaluation   of melena x3 days without significant anemia.  She takes ibuprofen   intermittently.     INFORMED CONSENT:  Written informed consent was obtained from the patient   after thorough explanation of indications, benefits, and risks of the   procedure, which included but was not limited to bleeding, infection,   perforation, adverse reaction to medications and missed lesions.     MEDICATIONS GIVEN:  Monitored anesthesia care with propofol.     Continuous telemetry, BP, pulse oximetry, and capnography were performed by   the anesthesiologist throughout the procedure.     A midsize flexible upper endoscope was used for the procedure.     FINDINGS:  The patient was placed in left lateral decubitus position.  After   anesthesia was achieved, the endoscope was passed in the posterior pharynx   under direct visualization, advanced to the second portion of the duodenum   without difficulty.  The patient tolerated the procedure well with following   findings:  1.  Esophagus:  Normal.  2.  Stomach:  She did have a small 1-2 cm hiatal hernia, sliding type.  She   did have an approximately 3-4 scattered antral 3-4 mm clean based gastric   erosions.  No stigmata of bleeding.  No old blood or fresh blood throughout   the stomach.  Retroflexion revealed normal cardia.  Random gastric biopsies   obtained for H. pylori.  3.  Duodenum:  She did have scattered erythema in the duodenal bulb and   duodenal sweep.  She had a benign 5-6 mm lymphangiectasia in the second   portion of the duodenum.  There was no old blood or fresh blood throughout the   duodenum.     IMPRESSION:   1.  Erosive antral gastritis, possible etiology of melena, but not certain.    Random gastric biopsies obtained for H. pylori.  2.  Nonerosive duodenitis.  3.  Benign  duodenal lymphangiectasia.  4. Small hiatal hernia     PLAN:   1.  Await biopsies.  2.  Proceed with colonoscopy tomorrow given lack of definitive source of   melena on upper endoscopy.  3.  Avoid NSAIDs.  4.  Switch PPI to p.o.  PPI daily for 8 weeks.  5.  Bowel prep for colonoscopy and clear liquid diet today and n.p.o. at   midnight.        ______________________________  MD EDIN SCHREIBER/CT    DD:  08/29/2022 09:57  DT:  08/29/2022 10:56    Job#:  [id number]

## 2022-08-29 NOTE — ANESTHESIA POSTPROCEDURE EVALUATION
Patient: Cherelle Crystal    Procedure Summary     Date: 08/29/22 Room / Location:  ENDOSCOPIC ULTRASOUND ROOM / SURGERY [facility]    Anesthesia Start: 0933 Anesthesia Stop: 0952    Procedure: GASTROSCOPY (Abdomen) Diagnosis: (MELENA)    Surgeons: Rich Agee M.D. Responsible Provider: Vic Castaneda M.D.    Anesthesia Type: MAC ASA Status: 2          Final Anesthesia Type: MAC  Last vitals  BP   Blood Pressure : 131/61    Temp   36.4 °C (97.6 °F)    Pulse   67   Resp   16    SpO2   97 %      Anesthesia Post Evaluation    Patient location during evaluation: PACU  Patient participation: complete - patient participated  Level of consciousness: awake and alert  Pain score: 0    Airway patency: patent  Anesthetic complications: no  Cardiovascular status: hemodynamically stable  Respiratory status: acceptable  Hydration status: euvolemic    PONV: none          No notable events documented.     Nurse Pain Score: 0 (NPRS)

## 2022-08-29 NOTE — DISCHARGE PLANNING
Case Management Discharge Planning    Admission Date: 8/27/2022  GMLOS: 2.1  ALOS: 1    6-Clicks ADL Score: 24  6-Clicks Mobility Score: 24      Anticipated Discharge Dispo: Discharge Disposition: Discharged to home/self care (01)    DME Needed: No    Action(s) Taken: Updated Provider/Nurse on Discharge Plan    No anticipated DC needs at this time.     Escalations Completed: None    Medically Clear: No    Next Steps: Medical clearance    Barriers to Discharge: Medical clearance

## 2022-08-29 NOTE — ANESTHESIA TIME REPORT
Anesthesia Start and Stop Event Times     Date Time Event    8/29/2022 0933 Anesthesia Start     0952 Anesthesia Stop        Responsible Staff  08/29/22    Name Role Begin End    Vic Castaneda M.D. Anesth 0933 0976        Overtime Reason:  no overtime (within assigned shift)    Comments:

## 2022-08-29 NOTE — ANESTHESIA PREPROCEDURE EVALUATION
Case: [id number] Date/Time: 08/29/22 0915    Procedure: GASTROSCOPY    Location:  ENDOSCOPIC ULTRASOUND ROOM / SURGERY [facility]    Surgeons: Rich Agee M.D.          Relevant Problems   CARDIAC   (positive) Primary hypertension       Physical Exam    Airway   Mallampati: II  TM distance: >3 FB  Neck ROM: full       Cardiovascular - normal exam  Rhythm: regular  Rate: normal  (-) murmur     Dental - normal exam           Pulmonary - normal exam  Breath sounds clear to auscultation     Abdominal    Neurological - normal exam                 Anesthesia Plan    ASA 2       Plan - MAC               Induction: intravenous      Pertinent diagnostic labs and testing reviewed    Informed Consent:    Anesthetic plan and risks discussed with patient.

## 2022-08-29 NOTE — PROGRESS NOTES
"Assumed care of pt at 0715, pt is alert and oriented, on room air, declines any pain at this time. Assessment complete. Pt states she had \"a few bloody loose bowel movements last night\". POC discussed, pt aware of procedure scheduled for 0915. CHG given by CNA. Safety precautions in place.   "

## 2022-08-29 NOTE — PROGRESS NOTES
Hospital Medicine Daily Progress Note    Date of Service  8/29/2022    Chief Complaint  Cherelle Crystal is a 77 y.o. female admitted 8/27/2022 with melena    Hospital Course  Cherelle Crystal is a 77 y.o. female with no significant past medical history other than essential tremor and urge incontinence admitted 8/27/2022 with melena.  Gastroenterology, Dr Jarrell Verdugo, will see the patient, will likely need a scope.      Interval Problem Update  Still having some bright red blood with bowel movements, however hemoglobin has been stable.    EGD on 8/29: No signs of acute bleeding.  We will plan for colonoscopy on 8/30.    Consultants/Specialty  Gastroenterology, Dr Jarrell Verdugo    Code Status  Full Code    Disposition  Patient is not medically cleared for discharge.   Anticipate discharge to to home with close outpatient follow-up.    Review of Systems  Review of Systems   Constitutional:  Negative for fever and malaise/fatigue.   Eyes:  Negative for discharge.   Respiratory:  Negative for shortness of breath and stridor.    Cardiovascular:  Negative for chest pain.   Gastrointestinal:  Positive for melena. Negative for abdominal pain, nausea and vomiting.   Genitourinary:  Negative for dysuria and urgency.   Musculoskeletal:  Negative for joint pain.   Neurological:  Negative for dizziness and headaches.   Endo/Heme/Allergies:  Does not bruise/bleed easily.   Psychiatric/Behavioral:  The patient is not nervous/anxious.    All other systems reviewed and are negative.     Physical Exam  Temp:  [36.3 °C (97.3 °F)-37 °C (98.6 °F)] 36.4 °C (97.6 °F)  Pulse:  [65-74] 72  Resp:  [16-20] 19  BP: ()/(50-98) 109/57  SpO2:  [94 %-100 %] 96 %    Physical Exam  Vitals and nursing note reviewed.   Constitutional:       General: She is not in acute distress.  Cardiovascular:      Rate and Rhythm: Normal rate and regular rhythm.      Pulses: Normal pulses.      Heart sounds: Normal heart sounds.     No friction rub. No  gallop.   Pulmonary:      Effort: Pulmonary effort is normal.      Breath sounds: Normal breath sounds.   Abdominal:      General: Abdomen is flat. There is no distension.      Palpations: Abdomen is soft.      Tenderness: There is no guarding.   Musculoskeletal:         General: No swelling.      Cervical back: Neck supple. No rigidity. No muscular tenderness.      Right lower leg: No edema.      Left lower leg: No edema.   Skin:     General: Skin is warm and dry.      Coloration: Skin is not jaundiced or pale.      Findings: No bruising or erythema.   Neurological:      Mental Status: She is alert.   Psychiatric:         Mood and Affect: Mood normal.     Fluids    Intake/Output Summary (Last 24 hours) at 8/29/2022 1352  Last data filed at 8/29/2022 0952  Gross per 24 hour   Intake 100 ml   Output --   Net 100 ml       Laboratory  Recent Labs     08/28/22  1800 08/29/22  0322 08/29/22  1115   WBC 5.5 5.1 5.1   RBC 3.40* 3.48* 3.22*   HEMOGLOBIN 10.8* 11.1* 10.3*   HEMATOCRIT 33.2* 34.1* 31.3*   MCV 97.6 98.0* 97.2   MCH 31.8 31.9 32.0   MCHC 32.5* 32.6* 32.9*   RDW 48.7 48.6 48.2   PLATELETCT 205 207 186   MPV 12.1 12.2 11.7     Recent Labs     08/27/22  1610 08/29/22  0322   SODIUM 139 141   POTASSIUM 4.5 3.8   CHLORIDE 104 109   CO2 26 24   GLUCOSE 109* 94   BUN 17 12   CREATININE 0.61 0.55   CALCIUM 9.0 8.4     Recent Labs     08/27/22  1610   APTT 31.1   INR 1.13     Imaging  DX-CHEST-PORTABLE (1 VIEW)   Final Result         1. No acute cardiopulmonary abnormalities are identified.         Assessment/Plan  * Melena- (present on admission)  Assessment & Plan  Has had melena for the past 3 days.  Seen on exam in the ED, stool occult positive per EDP  Hemodynamically stable, labs stable at this time  IV PPI  Gastroenterology consulted, Dr Jarrell Verdugo, EGD on 8/29, will plan for colonoscopy tomorrow.    Urgency incontinence- (present on admission)  Assessment & Plan  Continue mirabegron     Essential tremor-  (present on admission)  Assessment & Plan  Continue propranolol      VTE prophylaxis: SCDs/TEDs, GI bleeding

## 2022-08-29 NOTE — OR NURSING
0951: pt to PACU from Endo. Report received from anesthesia and RN. On 6 L O2 via nonrebreather mask, breathing spontaneous and nonlabored. Bite block still in place. Abdomen soft.     0955: rousing spontaneously, bite block removed. Denies pain or nausea.     1005: report to Louisa DOUGLASS.     1020: reassumed care of pt. No change.     1028: report called to Moraima DOUGLASS in [facility].     1035: transported back to room via [facility].

## 2022-08-29 NOTE — OR NURSING
Patient allergies and NPO status verified, home medication reconciliation completed and belongings secured. Patient verbalizes understanding of pain scale, expected course of stay and plan of care. Surgical site verified with patient. IV access established. Triple aim completed by CNA.

## 2022-08-29 NOTE — PROGRESS NOTES
Pt back to room 202-2 from PACU. Pt is alert and oriented, on room air, declines any pain at this time. POC discussed, pt aware of colonoscopy planned for tomorrow. Safety precautions in place.

## 2022-08-30 ENCOUNTER — ANESTHESIA (OUTPATIENT)
Dept: SURGERY | Facility: MEDICAL CENTER | Age: 77
DRG: 378 | End: 2022-08-30
Payer: MEDICARE

## 2022-08-30 ENCOUNTER — ANESTHESIA EVENT (OUTPATIENT)
Dept: SURGERY | Facility: MEDICAL CENTER | Age: 77
DRG: 378 | End: 2022-08-30
Payer: MEDICARE

## 2022-08-30 VITALS
TEMPERATURE: 97.5 F | DIASTOLIC BLOOD PRESSURE: 40 MMHG | HEIGHT: 62 IN | OXYGEN SATURATION: 97 % | BODY MASS INDEX: 27.14 KG/M2 | SYSTOLIC BLOOD PRESSURE: 116 MMHG | RESPIRATION RATE: 16 BRPM | WEIGHT: 147.49 LBS | HEART RATE: 66 BPM

## 2022-08-30 PROBLEM — K92.1 MELENA: Status: RESOLVED | Noted: 2022-08-27 | Resolved: 2022-08-30

## 2022-08-30 PROBLEM — K92.2 GI BLEED: Status: RESOLVED | Noted: 2022-08-28 | Resolved: 2022-08-30

## 2022-08-30 LAB
ANION GAP SERPL CALC-SCNC: 9 MMOL/L (ref 7–16)
BUN SERPL-MCNC: 9 MG/DL (ref 8–22)
CALCIUM SERPL-MCNC: 8.4 MG/DL (ref 8.4–10.2)
CHLORIDE SERPL-SCNC: 113 MMOL/L (ref 96–112)
CO2 SERPL-SCNC: 20 MMOL/L (ref 20–33)
CREAT SERPL-MCNC: 0.45 MG/DL (ref 0.5–1.4)
GFR SERPLBLD CREATININE-BSD FMLA CKD-EPI: 100 ML/MIN/1.73 M 2
GLUCOSE SERPL-MCNC: 78 MG/DL (ref 65–99)
PATHOLOGY CONSULT NOTE: NORMAL
POTASSIUM SERPL-SCNC: 3.8 MMOL/L (ref 3.6–5.5)
SODIUM SERPL-SCNC: 142 MMOL/L (ref 135–145)

## 2022-08-30 PROCEDURE — 88305 TISSUE EXAM BY PATHOLOGIST: CPT

## 2022-08-30 PROCEDURE — 0DBN8ZX EXCISION OF SIGMOID COLON, VIA NATURAL OR ARTIFICIAL OPENING ENDOSCOPIC, DIAGNOSTIC: ICD-10-PCS | Performed by: INTERNAL MEDICINE

## 2022-08-30 PROCEDURE — 700105 HCHG RX REV CODE 258: Performed by: INTERNAL MEDICINE

## 2022-08-30 PROCEDURE — A9270 NON-COVERED ITEM OR SERVICE: HCPCS | Performed by: STUDENT IN AN ORGANIZED HEALTH CARE EDUCATION/TRAINING PROGRAM

## 2022-08-30 PROCEDURE — 160009 HCHG ANES TIME/MIN: Performed by: INTERNAL MEDICINE

## 2022-08-30 PROCEDURE — 160002 HCHG RECOVERY MINUTES (STAT): Performed by: INTERNAL MEDICINE

## 2022-08-30 PROCEDURE — 00812 ANES LWR INTST SCR COLSC: CPT | Performed by: ANESTHESIOLOGY

## 2022-08-30 PROCEDURE — 160203 HCHG ENDO MINUTES - 1ST 30 MINS LEVEL 4: Performed by: INTERNAL MEDICINE

## 2022-08-30 PROCEDURE — 99100 ANES PT EXTEME AGE<1 YR&>70: CPT | Performed by: ANESTHESIOLOGY

## 2022-08-30 PROCEDURE — 160048 HCHG OR STATISTICAL LEVEL 1-5: Performed by: INTERNAL MEDICINE

## 2022-08-30 PROCEDURE — A9270 NON-COVERED ITEM OR SERVICE: HCPCS | Performed by: INTERNAL MEDICINE

## 2022-08-30 PROCEDURE — 99239 HOSP IP/OBS DSCHRG MGMT >30: CPT | Performed by: INTERNAL MEDICINE

## 2022-08-30 PROCEDURE — 160035 HCHG PACU - 1ST 60 MINS PHASE I: Performed by: INTERNAL MEDICINE

## 2022-08-30 PROCEDURE — 700111 HCHG RX REV CODE 636 W/ 250 OVERRIDE (IP): Performed by: ANESTHESIOLOGY

## 2022-08-30 PROCEDURE — 80048 BASIC METABOLIC PNL TOTAL CA: CPT

## 2022-08-30 PROCEDURE — 700102 HCHG RX REV CODE 250 W/ 637 OVERRIDE(OP): Performed by: STUDENT IN AN ORGANIZED HEALTH CARE EDUCATION/TRAINING PROGRAM

## 2022-08-30 PROCEDURE — 700102 HCHG RX REV CODE 250 W/ 637 OVERRIDE(OP): Performed by: INTERNAL MEDICINE

## 2022-08-30 PROCEDURE — 36415 COLL VENOUS BLD VENIPUNCTURE: CPT

## 2022-08-30 PROCEDURE — 94760 N-INVAS EAR/PLS OXIMETRY 1: CPT

## 2022-08-30 RX ORDER — PANTOPRAZOLE SODIUM 20 MG/1
20 TABLET, DELAYED RELEASE ORAL 2 TIMES DAILY
Qty: 120 TABLET | Refills: 0 | Status: SHIPPED | OUTPATIENT
Start: 2022-08-30 | End: 2022-10-29

## 2022-08-30 RX ORDER — SODIUM CHLORIDE, SODIUM LACTATE, POTASSIUM CHLORIDE, CALCIUM CHLORIDE 600; 310; 30; 20 MG/100ML; MG/100ML; MG/100ML; MG/100ML
INJECTION, SOLUTION INTRAVENOUS CONTINUOUS
Status: ACTIVE | OUTPATIENT
Start: 2022-08-30 | End: 2022-08-30

## 2022-08-30 RX ORDER — ONDANSETRON 2 MG/ML
4 INJECTION INTRAMUSCULAR; INTRAVENOUS
Status: DISCONTINUED | OUTPATIENT
Start: 2022-08-30 | End: 2022-08-30 | Stop reason: HOSPADM

## 2022-08-30 RX ORDER — SODIUM CHLORIDE, SODIUM LACTATE, POTASSIUM CHLORIDE, CALCIUM CHLORIDE 600; 310; 30; 20 MG/100ML; MG/100ML; MG/100ML; MG/100ML
INJECTION, SOLUTION INTRAVENOUS CONTINUOUS
Status: DISCONTINUED | OUTPATIENT
Start: 2022-08-30 | End: 2022-08-30 | Stop reason: HOSPADM

## 2022-08-30 RX ORDER — HALOPERIDOL 5 MG/ML
1 INJECTION INTRAMUSCULAR
Status: DISCONTINUED | OUTPATIENT
Start: 2022-08-30 | End: 2022-08-30 | Stop reason: HOSPADM

## 2022-08-30 RX ORDER — DIPHENHYDRAMINE HYDROCHLORIDE 50 MG/ML
12.5 INJECTION INTRAMUSCULAR; INTRAVENOUS
Status: DISCONTINUED | OUTPATIENT
Start: 2022-08-30 | End: 2022-08-30 | Stop reason: HOSPADM

## 2022-08-30 RX ADMIN — OMEPRAZOLE 20 MG: 20 CAPSULE, DELAYED RELEASE ORAL at 05:36

## 2022-08-30 RX ADMIN — PROPOFOL 300 MG: 10 INJECTION, EMULSION INTRAVENOUS at 08:08

## 2022-08-30 RX ADMIN — PROPRANOLOL HYDROCHLORIDE 10 MG: 20 TABLET ORAL at 05:36

## 2022-08-30 RX ADMIN — SODIUM CHLORIDE, POTASSIUM CHLORIDE, SODIUM LACTATE AND CALCIUM CHLORIDE: 600; 310; 30; 20 INJECTION, SOLUTION INTRAVENOUS at 07:00

## 2022-08-30 ASSESSMENT — ENCOUNTER SYMPTOMS
FEVER: 0
SHORTNESS OF BREATH: 0
CHILLS: 0
GASTROINTESTINAL NEGATIVE: 1

## 2022-08-30 NOTE — PROGRESS NOTES
Gastroenterology Progress Note     Author: Rich Agee M.D.   Date & Time Created: 8/30/2022 7:32 AM    Chief Complaint:  Melena    HPI:  This is a 76 yo female PMH HLD, hand tremors who was admitted to the hospital 8/27/22 with a 3 day history of black tarry stools. Denies fevers, chills, abdominal pain, N/V, GERD. VSS. Hgb: 12.1-->11.2. BUN/crt: WNL. Glucose: 109. Takes Aleve 3 to 4 times per day as needed for general aches. No history of GI bleed. Never had a colonoscopy.     Interval History:  8/30: No further melena with bowel prep.  Hgb overall stable.    Review of Systems:  Review of Systems   Constitutional:  Positive for malaise/fatigue. Negative for chills and fever.   Respiratory:  Negative for shortness of breath.    Cardiovascular:  Negative for chest pain.   Gastrointestinal: Negative.      Physical Exam:  Physical Exam  Vitals and nursing note reviewed.   Constitutional:       General: She is not in acute distress.     Appearance: Normal appearance. She is not ill-appearing or toxic-appearing.   Cardiovascular:      Rate and Rhythm: Normal rate and regular rhythm.      Pulses: Normal pulses.      Heart sounds: Normal heart sounds.   Pulmonary:      Effort: Pulmonary effort is normal.      Breath sounds: Normal breath sounds.   Abdominal:      General: Abdomen is flat. Bowel sounds are normal. There is no distension.      Palpations: Abdomen is soft. There is no mass.      Tenderness: There is no abdominal tenderness. There is no guarding or rebound.   Musculoskeletal:      Right lower leg: No edema.      Left lower leg: No edema.   Neurological:      General: No focal deficit present.      Mental Status: She is alert and oriented to person, place, and time.       Labs:          Recent Labs     08/27/22  1610 08/29/22  0322 08/30/22  0313   SODIUM 139 141 142   POTASSIUM 4.5 3.8 3.8   CHLORIDE 104 109 113*   CO2 26 24 20   BUN 17 12 9   CREATININE 0.61 0.55 0.45*   CALCIUM 9.0 8.4 8.4      Recent Labs     22  0313   ALTSGPT 13  --   --    ASTSGOT 16  --   --    ALKPHOSPHAT 54  --   --    TBILIRUBIN 0.6  --   --    LIPASE 23  --   --    GLUCOSE 109* 94 78     Recent Labs     22  1800 22  1115   RBC 3.81*   < > 3.40* 3.48* 3.22*   HEMOGLOBIN 12.1   < > 10.8* 11.1* 10.3*   HEMATOCRIT 36.8*   < > 33.2* 34.1* 31.3*   PLATELETCT 224   < > 205 207 186   PROTHROMBTM 14.0  --   --   --   --    APTT 31.1  --   --   --   --    INR 1.13  --   --   --   --     < > = values in this interval not displayed.     Recent Labs     22  1115   WBC 6.4   < > 5.5 5.1 5.1   NEUTSPOLYS 62.00  --   --   --   --    LYMPHOCYTES 25.80  --   --   --   --    MONOCYTES 10.10  --   --   --   --    EOSINOPHILS 1.40  --   --   --   --    BASOPHILS 0.50  --   --   --   --    ASTSGOT 16  --   --   --   --    ALTSGPT 13  --   --   --   --    ALKPHOSPHAT 54  --   --   --   --    TBILIRUBIN 0.6  --   --   --   --     < > = values in this interval not displayed.     Hemodynamics:  Temp (24hrs), Av.6 °C (97.8 °F), Min:36.3 °C (97.3 °F), Max:36.9 °C (98.4 °F)  Temperature: 36.8 °C (98.3 °F)  Pulse  Av.3  Min: 64  Max: 74   Blood Pressure : 135/59     Respiratory:    Respiration: 18, Pulse Oximetry: 97 %           Fluids:    Intake/Output Summary (Last 24 hours) at 2022 0732  Last data filed at 2022 2100  Gross per 24 hour   Intake 260 ml   Output 3 ml   Net 257 ml     Weight: 66.9 kg (147 lb 7.8 oz)  GI/Nutrition:  Orders Placed This Encounter   Procedures    Diet NPO Restrict to: Sips with Medications     Standing Status:   Standing     Number of Occurrences:   8     Order Specific Question:   Diet NPO Restrict to:     Answer:   Sips with Medications [3]     Medical Decision Making, by Problem:  Active Hospital Problems    Diagnosis     *Melena [K92.1]     GI bleed  [K92.2]     Urgency incontinence [N39.41]     Essential tremor [G25.0]      Impression:  Melena.  EGD 8/29 with erosive gastritis, possible etiology of melena, but no active bleeding.  Mild acute blood loss anemia  NSAID use  Urge urinary incontinence    Plan:  Proceed with colonoscopy today given lack of definitive source of melena on EGD  PPI PO daily for 8 weeks  Await biopsies for H pylori  Avoid NSAIDs  If colonoscopy normal and melena persists, consider outpatient capsule endoscopy    Quality-Core Measures   Reviewed items::  Radiology images reviewed, Labs reviewed and Medications reviewed

## 2022-08-30 NOTE — ANESTHESIA PREPROCEDURE EVALUATION
Case: [id number] Date/Time: 08/30/22 0715    Procedure: COLONOSCOPY (Abdomen)    Anesthesia type: General    Pre-op diagnosis: erosive antral gastritis and nonerosive duodenitis    Location:  ENDOSCOPIC ULTRASOUND ROOM / SURGERY [facility]    Surgeons: Rich Agee M.D.          Relevant Problems   CARDIAC   (positive) Primary hypertension       Physical Exam    Airway   Mallampati: II  TM distance: >3 FB  Neck ROM: full       Cardiovascular - normal exam  Rhythm: regular  Rate: normal  (-) murmur     Dental - normal exam           Pulmonary - normal exam  Breath sounds clear to auscultation     Abdominal    Neurological - normal exam                 Anesthesia Plan    ASA 2       Plan - MAC               Induction: intravenous    Postoperative Plan: Postoperative administration of opioids is intended.    Pertinent diagnostic labs and testing reviewed    Informed Consent:    Anesthetic plan and risks discussed with patient.    Use of blood products discussed with: patient whom consented to blood products.

## 2022-08-30 NOTE — CARE PLAN
The patient is Stable - Low risk of patient condition declining or worsening    Shift Goals Prepare for colonoscopy in AM  Clinical Goals: Finish bowel prep  Patient Goals: Sleep comfortably before colonoscopy    Progress made toward(s) clinical / shift goals:    Problem: Pain - Standard  Goal: Alleviation of pain or a reduction in pain to the patient’s comfort goal  Outcome: Progressing     Problem: Knowledge Deficit - Standard  Goal: Patient and family/care givers will demonstrate understanding of plan of care, disease process/condition, diagnostic tests and medications  Outcome: Progressing     Problem: Risk for Bleeding  Goal: Patient will take measures to prevent bleeding and recognizes signs of bleeding that need to be reported immediately to a health care professional  Outcome: Progressing  Goal: Patient will not experience bleeding as evidenced by normal blood pressure, stable hematocrit and hemoglobin levels and desired ranges for coagulation profiles  Outcome: Progressing       Patient is not progressing towards the following goals:

## 2022-08-30 NOTE — OR NURSING
Pt allergies and NPO status verified, home meds reconcilled. Belongings verified. Pt verbalizes understanding of the pain scale, expected course of stay, and plan of care.  Surgical site verified with pt.  IV access assessed, patent, dressing changed.

## 2022-08-30 NOTE — PROCEDURES
DATE OF PROCEDURE:  08/30/2022     PROCEDURE:  Colonoscopy with cold forceps biopsy polypectomy.     :  Rich Agee MD     INDICATIONS:  The patient is a 77-year-old female with recent NSAID use, who   presented for evaluation of melena.  Upper endoscopy showed mild erosive   gastritis only, but no active bleeding.  She has never had prior colonoscopy.     INFORMED CONSENT:  Written informed consent was obtained from the patient   after thorough explanation of indications, benefits, and risks of the   procedure, which included but was not limited to bleeding, infection,   perforation, adverse reaction to medications and missed lesions.     MEDICATIONS GIVEN:  Monitored anesthesia care with propofol.     Continuous telemetry, BP, pulse oximetry, and capnography were performed by   the anesthesiologist throughout the procedure.     A pediatric colonoscope was used for the procedure.     FINDINGS:  The patient was placed in left lateral decubitus position.  After   anesthesia was achieved, a digital rectal exam was performed and found to be   normal.  The colonoscope was then inserted into the rectum and advanced to the   cecum, which was identified by the appendiceal orifice and ileocecal valve.    It did require abdominal pressure in the sigmoid area to reach the cecum.  The   terminal ileum was intubated and found to be normal.  There was no old blood   or fresh blood throughout the terminal ileum.  The colonoscope was then   withdrawn with careful inspection of mucosa.  She had pandiverticulosis of   moderate severity throughout her entire colon.  She had a 4 mm sessile polyp   in the sigmoid colon, removed with cold forceps biopsy and placed in bottle A.    Retroflexed in the rectum revealed medium internal hemorrhoids.  Again,   there was no old blood or fresh blood throughout the entire colon.  The   patient underwent a MoviPrep, the results of which were good.  The patient   tolerated the  procedure well.     IMPRESSION:   1.  Sigmoid colon polyp, removed.  2.  Pandiverticulosis, moderate severity.  3.  Medium internal hemorrhoids.  4.  No old blood or fresh blood throughout the colon or terminal ileum.     PLAN:   1.  Await biopsies.  2.  Continue PPI p.o. daily for 8 weeks.  3.  Avoid NSAIDs.  4.  Advance diet.  5.  Okay for discharge from a GI standpoint.  If she were to have further   melena, consider outpatient capsule endoscopy.  6.  We will arrange followup in GI clinic in 2-4 weeks.        ______________________________  MD EDIN SCHREIBER/CT    DD:  08/30/2022 08:14  DT:  08/30/2022 09:38    Job#:  [id number]

## 2022-08-30 NOTE — ANESTHESIA POSTPROCEDURE EVALUATION
Patient: Cherelle Crystal    Procedure Summary     Date: 08/30/22 Room / Location:  ENDOSCOPIC ULTRASOUND ROOM / SURGERY [facility]    Anesthesia Start: 0747 Anesthesia Stop: 0828    Procedure: COLONOSCOPY (Abdomen) Diagnosis: (erosive antral gastritis and nonerosive duodenitis; diverticulosis; colon polyp)    Surgeons: Rich Agee M.D. Responsible Provider: Jimmy Marquez M.D.    Anesthesia Type: MAC ASA Status: 2          Final Anesthesia Type: MAC  Last vitals  BP   Blood Pressure : 102/55    Temp   36.5 °C (97.7 °F)    Pulse   67   Resp   16    SpO2   98 %      Anesthesia Post Evaluation    Patient location during evaluation: PACU  Patient participation: complete - patient participated  Level of consciousness: awake and alert    Airway patency: patent  Anesthetic complications: no  Cardiovascular status: hemodynamically stable  Respiratory status: acceptable  Hydration status: euvolemic    PONV: none          There were no known notable events for this encounter.     Nurse Pain Score: 0 (NPRS)

## 2022-08-30 NOTE — OR SURGEON
Immediate Post OP Note    PreOp Diagnosis: Melena      PostOp Diagnosis: Diverticulosis, colon polyp, internal hemorrhoids      Procedure(s):  COLONOSCOPY with cold forceps biopsy polypectomy- Wound Class: None    Surgeon(s):  Rich Agee M.D.    Anesthesiologist/Type of Anesthesia:  Jimmy APONTE/General    Surgical Staff:  Circulator: Jimmy Narayanan R.N.  Endoscopy Technician: Monica Kelley; Matty Sheets; Jodi Catherine    Specimens removed if any:  ID Type Source Tests Collected by Time Destination   A : Sigmoid colon polyp, Biopsy Other Other PATHOLOGY SPECIMEN Rich Agee M.D. 8/30/2022  8:05 AM        Estimated Blood Loss: None    Findings:   4 mm sigmoid colon polyp, removed  Pan-diverticulosis, moderate severity  Medium internal hemorrhoids  No old blood or fresh blood throughout    Complications: None        8/30/2022 8:08 AM Rich Agee M.D.

## 2022-08-30 NOTE — ANESTHESIA TIME REPORT
Anesthesia Start and Stop Event Times     Date Time Event    8/30/2022 0745 Ready for Procedure     0747 Anesthesia Start     0828 Anesthesia Stop        Responsible Staff  08/30/22    Name Role Begin End    Jimmy Marquez M.D. Anesth 0747 0801        Overtime Reason:  no overtime (within assigned shift)    Comments:

## 2022-08-30 NOTE — DISCHARGE SUMMARY
"Discharge Summary    CHIEF COMPLAINT ON ADMISSION  Chief Complaint   Patient presents with    Bloody Stools       Reason for Admission  Blood in stool     Admission Date  8/27/2022    CODE STATUS  Full Code    HPI & HOSPITAL COURSE  Per notes, \"77 y.o. female who presented 8/27/2022 with melena.  Has had diarrhea with black tarry stools for the past 3 days.  Denies bright red blood per rectum, hematemesis, no significant abdominal pain.  She takes NSAIDs occasionally, only 2-3 times in this past week.  She has never had a colonoscopy before.  Has never had GI bleed in the past.\"    Patient was admitted for possible GI bleed, melena.  She was evaluated by gastroenterology who recommended an EGD which was performed 15/29.  EGD showed evidence of gastritis, which could be the etiology of melena.  However, given uncertain results, colonoscopy was performed on 8/30.  Colonoscopy revealed 4 mm sigmoid colon polyp, which was removed, pandiverticulosis, moderate severity, medium internal hemorrhoids.  No old or fresh blood throughout.  Gastroenterology recommended follow-up in the outpatient setting if GI bleed/melena persist. Gastroenterology also recommended PPI for 8 weeks.  Patient had resolution of all symptoms, hemoglobin was stable she required no blood transfusions throughout hospitalization.  Recommend she follow-up with gastroenterology and PCP in the outpatient setting.    Therefore, she is discharged in good and stable condition to home with close outpatient follow-up.    The patient met 2-midnight criteria for an inpatient stay at the time of discharge.    Discharge Date  8/30/2022    FOLLOW UP ITEMS POST DISCHARGE  FU with PCP     DISCHARGE DIAGNOSES  Principal Problem (Resolved):    Melena POA: Yes  Active Problems:    Essential tremor POA: Yes      Overview: She reports essential tremor noticeable mostly in the left hand 2nd digit       but it can also involve both hands. It has slightly progressed over the "       past year. She was given propranolol 10 mg initially twice daily but this       caused fatigue so she reduced to once daily. She is now open to going back       up to twice daily.            Current regimen: propranolol 10 mg twice daily      Previous regimen: propranolol 10 mg once daily    Urgency incontinence POA: Yes      Overview: She reports urge incontinence.  She does not have enough time to get to       the bathroom and has to wear a pad due to urinary leakage.  She is never       tried any medicines for this.  It has been insidious onset.  No history of       recurrent urinary tract infections.  We started Myrbetriq 25 mg daily she       notes approximately 90% improvement with this medicine, no noted side       effects.            Current regimen: Mirabegron 25 mg daily  Resolved Problems:    GI bleed POA: Yes      FOLLOW UP  Future Appointments   Date Time Provider Department Center   11/14/2022  7:00 AM Farhana Quintanilla D.O. [facility] None     Farhana Quintanilla D.O.  [address] [facility] [address]  [facility] [id number] [phone]    Follow up in 1 week(s)        MEDICATIONS ON DISCHARGE     Medication List        START taking these medications        Instructions   pantoprazole 20 MG tablet  Commonly known as: PROTONIX   Take 1 Tablet by mouth 2 times a day for 60 days.  Dose: 20 mg            CONTINUE taking these medications        Instructions   ADDERALL (15MG) 15 MG tablet  Generic drug: amphetamine-dextroamphetamine   Take 15 mg by mouth 2 times a day as needed (on work days).  Dose: 15 mg     albuterol 108 (90 Base) MCG/ACT Aers inhalation aerosol   Inhale 1-2 Puffs every 6 hours as needed for Shortness of Breath.  Dose: 1-2 Puff     Myrbetriq 25 MG Tb24  Generic drug: Mirabegron ER   Take 25 mg by mouth every evening.  Dose: 25 mg     propranolol 10 MG Tabs  Commonly known as: INDERAL   Take 10 mg by mouth 2 times a day.  Dose: 10 mg     VITAMIN C PO   Take 1 Tablet by mouth every  evening.  Dose: 1 Tablet     VITAMIN D3 PO   Take 1 Capsule by mouth every evening.  Dose: 1 Capsule            STOP taking these medications      Aleve 220 MG tablet  Generic drug: naproxen     asa/apap/caffeine 250-250-65 MG Tabs  Commonly known as: EXCEDRIN     VITAMIN E PO              Allergies  Allergies   Allergen Reactions    Penicillin G Rash     Rash all over    Lavender Oil Rash             DIET  Orders Placed This Encounter   Procedures    Diet Order Diet: Regular     Standing Status:   Standing     Number of Occurrences:   1     Order Specific Question:   Diet:     Answer:   Regular [1]       ACTIVITY  As tolerated.  Weight bearing as tolerated    CONSULTATIONS  Gastroenterology     PROCEDURES  EGD and colonoscopy    LABORATORY  Lab Results   Component Value Date    SODIUM 142 08/30/2022    POTASSIUM 3.8 08/30/2022    CHLORIDE 113 (H) 08/30/2022    CO2 20 08/30/2022    GLUCOSE 78 08/30/2022    BUN 9 08/30/2022    CREATININE 0.45 (L) 08/30/2022        Lab Results   Component Value Date    WBC 5.1 08/29/2022    HEMOGLOBIN 10.3 (L) 08/29/2022    HEMATOCRIT 31.3 (L) 08/29/2022    PLATELETCT 186 08/29/2022        Total time of the discharge process exceeds 36 minutes.

## 2022-08-30 NOTE — OR NURSING
0812 To PACU from endo via gurney s/p colonoscopy. Pt sleeping respirations spontaneous and non-labored. Abdomen soft.    0820 Dr Agee at the bedside to discuss the procedure.     0827 Pt has no complaints.    0832 Pt's  updated.     0842 No changes. Report to [facility] RN.

## 2022-08-30 NOTE — DISCHARGE INSTRUCTIONS
Bloody Stools  Bloody stools means there is blood in your poop (stool). It is a sign that there is a problem somewhere in the digestive system. It is important for your doctor to find the cause of your bleeding, so the problem can be treated.   HOME CARE  Only take medicine as told by your doctor.  Eat foods with fiber (prunes, bran cereals).  Drink enough fluids to keep your pee (urine) clear or pale yellow.  Sit in warm water (sitz bath) for 10 to 15 minutes as told by your doctor.  Know how to take your medicines (enemas, suppositories) if advised by your doctor.  Watch for signs that you are getting better or getting worse.  GET HELP RIGHT AWAY IF:   You are not getting better.  You start to get better but then get worse again.  You have new problems.  You have severe bleeding from the place where poop comes out (rectum) that does not stop.  You throw up (vomit) blood.  You feel weak or pass out (faint).  You have a fever.  MAKE SURE YOU:   Understand these instructions.  Will watch your condition.  Will get help right away if you are not doing well or get worse.  Document Released: 12/06/2010 Document Revised: 03/11/2013 Document Reviewed: 05/04/2012  [employer]® Patient Information ©2014 [employer], [employer].

## 2022-08-30 NOTE — PROGRESS NOTES
Discharge to home with spouse . Pt signed a copy of the discharge papers and confirms all questions have been answered.. Second copy of d/c papers in chart.  Prescriptions sent to pts pharmacy. IV discontinued. Pt states all person belongings are in possession.

## 2022-08-31 ENCOUNTER — PATIENT OUTREACH (OUTPATIENT)
Dept: MEDICAL GROUP | Facility: PHYSICIAN GROUP | Age: 77
End: 2022-08-31
Payer: MEDICARE

## 2022-08-31 NOTE — PROGRESS NOTES
8/31/22 9:20 am:  Nurse CM outreach call to complete hospital follow-up TCM call. Pt reports she is home doing well. States no further bloody stools. Nurse reviewed discharge instructions. Pt states she hasn't picked up Protonix yet because prescription wasn't available at pharmacy. Pt states she was told pharmacist needed to contact hospital provider to clarify prescription. Pt also states she needs a refill on her Adderall. Reports she didn't  last prescription that was called in to pharmacy and now it is no longer available. Nurse scheduled pt for hospital follow-up appt for 9/6/22. Message routed to PCP.

## 2022-08-31 NOTE — PROGRESS NOTES
"Subjective:     Cherelle Crystal is a 77 y.o. female who presents for Hospital Follow-up.    POST DISCHARGE CALL:  Discharge Date:8/30/2022   Date of Outreach Call: 8/31/2022  9:13 AM  Now that you're home, how are you doing? Good  Comment:Reports feeling tired  Do you have questions about your medications? Yes  Comment:Pt states she needs a refill on her adderall.  Did you fill your medications? *  Do you have a follow-up appointment scheduled?Yes  Comment:Sept 6, 2022  Discharging Department: *  Number of Attempts: 1  Current or previous attempts completed within two business days of discharge? Yes  Provided education regarding treatment plan, medication, self-management, ADLs? Yes  Has patient completed Advance Directive? If yes, advise them to bring to appointment. No  Care Manager phone number provided? Yes  Is there anything else I can help you with? No    HPI:   Recently hospitalized for  diarrhea with black tarry stools for the past 3 days, she was seen at urgent care on 8/27/2022 who directed her to the ER.  Reported she was taking NSAIDs occasionally, only 2-3 times in this past week.  She has never had a colonoscopy before.  Has never had GI bleed in the past.\"   Patient was admitted for possible GI bleed, melena.  EGD showed evidence of gastritis, which could be the etiology of melena.  Colonoscopy was also performed on 8/30 which showed 4 mm sigmoid colon polyp, which was removed, pandiverticulosis, moderate severity, medium internal hemorrhoids.  No old or fresh blood throughout.  Gastroenterology recommended follow-up in the outpatient setting if GI bleed/melena persist. Gastroenterology also recommended PPI for 8 weeks to which she started pantoprazole 20 mg twice daily.    Denies any abdominal pain, diarrhea, melena, nausea, vomiting, or constipation.  States that she is no longer taking NSAIDs and discussed if she has any pain where she feels like she is to take over-the-counter pain relievers to " "try Tylenol instead, she is unsure about this because it \"kills her liver \"however discussed her liver function is normal and due to her possible GI bleed from taking NSAIDs few times weekly to avoid these.  She also states that when she saw me for her Adderall refill that I canceled her prescription so she is unable to get them from the pharmacy, discussed that her prescriptions were canceled by the urgent care provider and I will refill these today in clinic for 2 months to get her to her next appointment with her PCP.  Current medicines (including reconciliation performed today)  Current Outpatient Medications   Medication Sig Dispense Refill    amphetamine-dextroamphetamine (ADDERALL) 15 MG tablet Take 1 Tablet by mouth 2 times a day as needed (on work days) for up to 30 days. 60 Tablet 0    [START ON 10/7/2022] amphetamine-dextroamphetamine (ADDERALL) 15 MG tablet Take 1 Tablet by mouth 2 times a day as needed (on work days) for up to 30 days. 60 Tablet 0    albuterol 108 (90 Base) MCG/ACT Aero Soln inhalation aerosol Inhale 1-2 Puffs every 6 hours as needed for Shortness of Breath.      Mirabegron ER (MYRBETRIQ) 25 MG TABLET SR 24 HR Take 25 mg by mouth every evening.      propranolol (INDERAL) 10 MG Tab Take 10 mg by mouth 2 times a day.      Ascorbic Acid (VITAMIN C PO) Take 1 Tablet by mouth every evening.      Cholecalciferol (VITAMIN D3 PO) Take 1 Capsule by mouth every evening.      pantoprazole (PROTONIX) 20 MG tablet Take 1 Tablet by mouth 2 times a day for 60 days. 120 Tablet 0     No current facility-administered medications for this visit.       Allergies:   Penicillin g and Lavender oil    Social History     Tobacco Use    Smoking status: Never    Smokeless tobacco: Never   Vaping Use    Vaping Use: Never used   Substance Use Topics    Alcohol use: Not Currently    Drug use: Never       Objective:     Vitals:    09/06/22 1353   BP: 130/80   BP Location: Right arm   Patient Position: Sitting   BP " "Cuff Size: Adult   Pulse: 69   Resp: 16   Temp: 37.2 °C (98.9 °F)   TempSrc: Temporal   SpO2: 99%   Weight: 67.6 kg (149 lb)   Height: 1.575 m (5' 2.01\")     Body mass index is 27.25 kg/m².    Physical Exam  Vitals reviewed.   Constitutional:       General: She is not in acute distress.     Appearance: Normal appearance.   Eyes:      Conjunctiva/sclera: Conjunctivae normal.      Pupils: Pupils are equal, round, and reactive to light.   Cardiovascular:      Rate and Rhythm: Normal rate and regular rhythm.      Pulses: Normal pulses.      Heart sounds: Normal heart sounds. No murmur heard.  Pulmonary:      Effort: Pulmonary effort is normal. No respiratory distress.      Breath sounds: Normal breath sounds. No stridor. No wheezing, rhonchi or rales.   Chest:      Chest wall: No tenderness.   Abdominal:      General: Bowel sounds are normal.   Musculoskeletal:      Right lower leg: No edema.      Left lower leg: No edema.   Skin:     General: Skin is warm.      Coloration: Skin is not pale.   Neurological:      Mental Status: She is alert and oriented to person, place, and time.   Psychiatric:         Mood and Affect: Mood normal.         Behavior: Behavior normal.         Assessment and Plan:   1. Attention deficit hyperactivity disorder (ADHD), predominantly inattentive type  Night, taking Adderall 15 mg twice daily as needed.  2-month refill sent to pharmacy until she sees PCP  in November, I did refill recently however her prescriptions were canceled well at urgent care  - amphetamine-dextroamphetamine (ADDERALL) 15 MG tablet; Take 1 Tablet by mouth 2 times a day as needed (on work days) for up to 30 days.  Dispense: 60 Tablet; Refill: 0  - amphetamine-dextroamphetamine (ADDERALL) 15 MG tablet; Take 1 Tablet by mouth 2 times a day as needed (on work days) for up to 30 days.  Dispense: 60 Tablet; Refill: 0    2. History of melena  Acute issue while inpatient, referral placed to gastroenterology for a follow-up.  " Discussed avoiding NSAIDs and taking Tylenol as needed for her pain instead, discussed getting a follow-up lab work done in a month to make sure her blood counts are trending upward and discussed ways to increase her oral dietary iron.  - Referral to Gastroenterology  - CBC WITH DIFFERENTIAL; Future  - FERRITIN; Future    3. Anemia, unspecified type  - IRON/TOTAL IRON BIND; Future    - Chart and discharge summary were reviewed.   - Hospitalization and results reviewed with patient.   - Medications reviewed including instructions regarding high risk medications, dosing and side effects.  - Recommended Services: No services needed at this time  - Advance directive/POLST on file?  No    Follow-up:No follow-ups on file.    Face-to-face transitional care management services with MODERATE (today's visit is within 14 days post discharge & LACE+ score of 28-58) medical decision complexity were provided.     LACE+ Historical Score Over Time (0-28: Low, 29-58: Medium, 59+: High): 25

## 2022-08-31 NOTE — TELEPHONE ENCOUNTER
Patient informed that she cannot get a refill unless seeing a provider. She is scheduled for a hospital follow up on 09/06 and will discuss it during that time.

## 2022-09-06 ENCOUNTER — OFFICE VISIT (OUTPATIENT)
Dept: MEDICAL GROUP | Facility: PHYSICIAN GROUP | Age: 77
End: 2022-09-06
Payer: MEDICARE

## 2022-09-06 VITALS
OXYGEN SATURATION: 99 % | HEIGHT: 62 IN | HEART RATE: 69 BPM | TEMPERATURE: 98.9 F | WEIGHT: 149 LBS | DIASTOLIC BLOOD PRESSURE: 80 MMHG | SYSTOLIC BLOOD PRESSURE: 130 MMHG | BODY MASS INDEX: 27.42 KG/M2 | RESPIRATION RATE: 16 BRPM

## 2022-09-06 DIAGNOSIS — Z87.19 HISTORY OF MELENA: ICD-10-CM

## 2022-09-06 DIAGNOSIS — D64.9 ANEMIA, UNSPECIFIED TYPE: ICD-10-CM

## 2022-09-06 DIAGNOSIS — F90.0 ATTENTION DEFICIT HYPERACTIVITY DISORDER (ADHD), PREDOMINANTLY INATTENTIVE TYPE: ICD-10-CM

## 2022-09-06 PROCEDURE — 99495 TRANSJ CARE MGMT MOD F2F 14D: CPT | Performed by: FAMILY MEDICINE

## 2022-09-06 RX ORDER — PANTOPRAZOLE SODIUM 40 MG/1
TABLET, DELAYED RELEASE ORAL
COMMUNITY
Start: 2022-09-02 | End: 2022-09-06

## 2022-09-06 RX ORDER — DEXTROAMPHETAMINE SACCHARATE, AMPHETAMINE ASPARTATE, DEXTROAMPHETAMINE SULFATE AND AMPHETAMINE SULFATE 3.75; 3.75; 3.75; 3.75 MG/1; MG/1; MG/1; MG/1
15 TABLET ORAL 2 TIMES DAILY PRN
Qty: 60 TABLET | Refills: 0 | Status: SHIPPED | OUTPATIENT
Start: 2022-10-07 | End: 2022-11-06

## 2022-09-06 RX ORDER — DEXTROAMPHETAMINE SACCHARATE, AMPHETAMINE ASPARTATE, DEXTROAMPHETAMINE SULFATE AND AMPHETAMINE SULFATE 3.75; 3.75; 3.75; 3.75 MG/1; MG/1; MG/1; MG/1
15 TABLET ORAL 2 TIMES DAILY PRN
Qty: 60 TABLET | Refills: 0 | Status: SHIPPED | OUTPATIENT
Start: 2022-09-06 | End: 2022-09-06

## 2022-09-06 RX ORDER — DEXTROAMPHETAMINE SACCHARATE, AMPHETAMINE ASPARTATE, DEXTROAMPHETAMINE SULFATE AND AMPHETAMINE SULFATE 3.75; 3.75; 3.75; 3.75 MG/1; MG/1; MG/1; MG/1
15 TABLET ORAL 2 TIMES DAILY PRN
Qty: 60 TABLET | Refills: 0 | Status: SHIPPED | OUTPATIENT
Start: 2022-09-06 | End: 2022-10-06

## 2022-09-06 ASSESSMENT — FIBROSIS 4 INDEX: FIB4 SCORE: 1.81

## 2022-10-11 ENCOUNTER — OFFICE VISIT (OUTPATIENT)
Dept: MEDICAL GROUP | Facility: PHYSICIAN GROUP | Age: 77
End: 2022-10-11
Payer: MEDICARE

## 2022-10-11 VITALS
HEART RATE: 71 BPM | HEIGHT: 62 IN | DIASTOLIC BLOOD PRESSURE: 74 MMHG | WEIGHT: 150 LBS | SYSTOLIC BLOOD PRESSURE: 138 MMHG | BODY MASS INDEX: 27.6 KG/M2 | RESPIRATION RATE: 14 BRPM | OXYGEN SATURATION: 96 % | TEMPERATURE: 99 F

## 2022-10-11 DIAGNOSIS — Z23 NEED FOR VACCINATION: ICD-10-CM

## 2022-10-11 DIAGNOSIS — L24.89 IRRITANT CONTACT DERMATITIS DUE TO OTHER AGENTS: ICD-10-CM

## 2022-10-11 PROCEDURE — 90662 IIV NO PRSV INCREASED AG IM: CPT | Performed by: FAMILY MEDICINE

## 2022-10-11 PROCEDURE — G0008 ADMIN INFLUENZA VIRUS VAC: HCPCS | Performed by: FAMILY MEDICINE

## 2022-10-11 PROCEDURE — 99213 OFFICE O/P EST LOW 20 MIN: CPT | Mod: 25 | Performed by: FAMILY MEDICINE

## 2022-10-11 RX ORDER — TRIAMCINOLONE ACETONIDE 1 MG/G
1 CREAM TOPICAL 2 TIMES DAILY
Qty: 45 G | Refills: 0 | Status: SHIPPED | OUTPATIENT
Start: 2022-10-11 | End: 2023-03-14

## 2022-10-11 ASSESSMENT — FIBROSIS 4 INDEX: FIB4 SCORE: 1.81

## 2022-10-11 NOTE — PROGRESS NOTES
Subjective:     CC:   Chief Complaint   Patient presents with    Hair/Scalp Problem     Infection, x 10 days, itchy, red, spreading, hair not falling out        HPI:   Cherelle presents today with a concern regarding a rash.   R side of forehead/scalp, started on 10/4/22, started 1 day prior to traveling to [place]. She denies any new soaps, lotions, hair products, detergents that she may have washed her bedding, and or hiking outside near poison ivy. States she does do a lot of gardening and maybe touched her face with her garden gloves with an irritant on it. On further discussion we talked about her gardening and she does have lavender growing in her garden and has been weeding it and cleaning out the dead lavender, has a known allergy to lavender oil.  Endorses there was some watery drainage from scalp when the rash first started, however this has gone away or she started using antibiotic ointment cream as well as hydrocortisone cream 1% that she got over-the-counter from the pharmacy notes that these have been helpful.  She states that the rash is itchy, red, and she has noticed dry flaking from her scalp where the rash is present.  Dry, flaky, mildly red,       No problem-specific Assessment & Plan notes found for this encounter.      Current Outpatient Medications Ordered in Epic   Medication Sig Dispense Refill    triamcinolone acetonide (KENALOG) 0.1 % Cream Apply 1 Application topically 2 times a day. 45 g 0    amphetamine-dextroamphetamine (ADDERALL) 15 MG tablet Take 1 Tablet by mouth 2 times a day as needed (on work days) for up to 30 days. 60 Tablet 0    pantoprazole (PROTONIX) 20 MG tablet Take 1 Tablet by mouth 2 times a day for 60 days. 120 Tablet 0    albuterol 108 (90 Base) MCG/ACT Aero Soln inhalation aerosol Inhale 1-2 Puffs every 6 hours as needed for Shortness of Breath.      Mirabegron ER (MYRBETRIQ) 25 MG TABLET SR 24 HR Take 25 mg by mouth every evening.      propranolol (INDERAL) 10 MG Tab Take  "10 mg by mouth 2 times a day.      Ascorbic Acid (VITAMIN C PO) Take 1 Tablet by mouth every evening.      Cholecalciferol (VITAMIN D3 PO) Take 1 Capsule by mouth every evening.       No current Epic-ordered facility-administered medications on file.       Health Maintenance: completed      Objective:     Exam:  /74 (BP Location: Left arm, Patient Position: Sitting, BP Cuff Size: Adult)   Pulse 71   Temp 37.2 °C (99 °F) (Temporal)   Resp 14   Ht 1.575 m (5' 2.01\")   Wt 68 kg (150 lb)   SpO2 96%   BMI 27.43 kg/m²  Body mass index is 27.43 kg/m².    Physical Exam  Vitals reviewed.   Constitutional:       General: She is not in acute distress.     Appearance: Normal appearance.   Eyes:      Conjunctiva/sclera: Conjunctivae normal.      Pupils: Pupils are equal, round, and reactive to light.   Cardiovascular:      Rate and Rhythm: Normal rate.      Pulses: Normal pulses.      Heart sounds: No murmur heard.  Pulmonary:      Effort: Pulmonary effort is normal. No respiratory distress.      Breath sounds: Normal breath sounds. No stridor. No wheezing, rhonchi or rales.   Chest:      Chest wall: No tenderness.   Skin:     Findings: Rash present. Rash is scaling.             Comments: Rash on right side of forehead and into hairline, scaling and mildly red appears to be consistent with a dermatitis.  no drainage or warmth observed.    Neurological:      Mental Status: She is alert and oriented to person, place, and time.   Psychiatric:         Mood and Affect: Mood normal.         Behavior: Behavior normal.        A chaperone was offered to the patient during today's exam. Patient declined chaperone.    Assessment & Plan:     77 y.o. female with the following -     1. Need for vaccination  - INFLUENZA VACCINE, HIGH DOSE (65+ ONLY)    2. Irritant contact dermatitis due to other agents  New problem x 1 week, on examination it is on the right side of her forehead into her hairline, slight scaling and peeling of " skin likely from the hydrocortisone cream she has been she has been using.  Mildly erythemic however no drainage or warmth present from rash site.  When discussing contact allergens she notes that she does grow lavender and she was weeding in prior to leaving and she possibly touched her gardening gloves to her face to push her hair back which could have caused her contact dermatitis reaction.  Steroid cream sent to pharmacy to decrease inflammation and itching.  Discussed continue to avoid irritants that she is allergic to such as the lavender.  Janeth if symptoms worsen or do not improve to please let us know.  Other orders  - triamcinolone acetonide (KENALOG) 0.1 % Cream; Apply 1 Application topically 2 times a day.  Dispense: 45 g; Refill: 0       I spent a total of 20 minutes with record review, exam, communication with the patient, communication with other providers, and documentation of this encounter.      No follow-ups on file.    Please note that this dictation was created using voice recognition software. I have made every reasonable attempt to correct obvious errors, but I expect that there are errors of grammar and possibly content that I did not discover before finalizing the note.

## 2022-10-25 ENCOUNTER — TELEPHONE (OUTPATIENT)
Dept: MEDICAL GROUP | Facility: PHYSICIAN GROUP | Age: 77
End: 2022-10-25
Payer: MEDICARE

## 2022-10-25 NOTE — TELEPHONE ENCOUNTER
1. Caller Name: Cherelle Crystal                          Call Back Number: [phone] (home)         How would the patient prefer to be contacted with a response: Phone call OK to leave a detailed message    Patient called and said pharmacy won't release the rX for adderrall from 10/7/22   They were out of town and were not able to .  Could you send another RX starting today?

## 2022-10-26 NOTE — TELEPHONE ENCOUNTER
Called and spoke with Pharmacy Zak and he will have it ready for  RX tomorrow after 11am.  Called and let Cherelle know.

## 2022-11-03 ENCOUNTER — APPOINTMENT (OUTPATIENT)
Dept: MEDICAL GROUP | Facility: PHYSICIAN GROUP | Age: 77
End: 2022-11-03
Payer: MEDICARE

## 2022-11-14 ENCOUNTER — OFFICE VISIT (OUTPATIENT)
Dept: MEDICAL GROUP | Facility: PHYSICIAN GROUP | Age: 77
End: 2022-11-14
Payer: MEDICARE

## 2022-11-14 VITALS
RESPIRATION RATE: 12 BRPM | BODY MASS INDEX: 27.85 KG/M2 | SYSTOLIC BLOOD PRESSURE: 126 MMHG | HEART RATE: 78 BPM | OXYGEN SATURATION: 96 % | DIASTOLIC BLOOD PRESSURE: 60 MMHG | TEMPERATURE: 97.9 F | WEIGHT: 152.3 LBS

## 2022-11-14 DIAGNOSIS — G25.0 ESSENTIAL TREMOR: ICD-10-CM

## 2022-11-14 DIAGNOSIS — E55.9 VITAMIN D DEFICIENCY: ICD-10-CM

## 2022-11-14 DIAGNOSIS — L98.9 SKIN LESION: ICD-10-CM

## 2022-11-14 DIAGNOSIS — F90.0 ATTENTION DEFICIT HYPERACTIVITY DISORDER (ADHD), PREDOMINANTLY INATTENTIVE TYPE: ICD-10-CM

## 2022-11-14 DIAGNOSIS — L24.89 IRRITANT CONTACT DERMATITIS DUE TO OTHER AGENTS: ICD-10-CM

## 2022-11-14 DIAGNOSIS — Z12.31 ENCOUNTER FOR SCREENING MAMMOGRAM FOR BREAST CANCER: ICD-10-CM

## 2022-11-14 DIAGNOSIS — E78.2 MIXED HYPERLIPIDEMIA: ICD-10-CM

## 2022-11-14 DIAGNOSIS — N39.41 URGENCY INCONTINENCE: ICD-10-CM

## 2022-11-14 DIAGNOSIS — I10 PRIMARY HYPERTENSION: ICD-10-CM

## 2022-11-14 DIAGNOSIS — Z78.0 MENOPAUSE: ICD-10-CM

## 2022-11-14 DIAGNOSIS — D50.9 IRON DEFICIENCY ANEMIA, UNSPECIFIED IRON DEFICIENCY ANEMIA TYPE: ICD-10-CM

## 2022-11-14 PROCEDURE — 99215 OFFICE O/P EST HI 40 MIN: CPT | Performed by: INTERNAL MEDICINE

## 2022-11-14 RX ORDER — DEXTROAMPHETAMINE SACCHARATE, AMPHETAMINE ASPARTATE, DEXTROAMPHETAMINE SULFATE AND AMPHETAMINE SULFATE 5; 5; 5; 5 MG/1; MG/1; MG/1; MG/1
20 TABLET ORAL 2 TIMES DAILY
COMMUNITY
End: 2022-11-14 | Stop reason: SDUPTHER

## 2022-11-14 RX ORDER — DEXTROAMPHETAMINE SACCHARATE, AMPHETAMINE ASPARTATE, DEXTROAMPHETAMINE SULFATE AND AMPHETAMINE SULFATE 3.75; 3.75; 3.75; 3.75 MG/1; MG/1; MG/1; MG/1
15 TABLET ORAL 2 TIMES DAILY
Qty: 60 TABLET | Refills: 0 | Status: SHIPPED | OUTPATIENT
Start: 2023-01-24 | End: 2023-02-14 | Stop reason: SDUPTHER

## 2022-11-14 RX ORDER — DEXTROAMPHETAMINE SACCHARATE, AMPHETAMINE ASPARTATE, DEXTROAMPHETAMINE SULFATE AND AMPHETAMINE SULFATE 3.75; 3.75; 3.75; 3.75 MG/1; MG/1; MG/1; MG/1
15 TABLET ORAL 2 TIMES DAILY
Qty: 60 TABLET | Refills: 0 | Status: SHIPPED | OUTPATIENT
Start: 2022-12-25 | End: 2023-01-24

## 2022-11-14 RX ORDER — PROPRANOLOL HYDROCHLORIDE 10 MG/1
20 TABLET ORAL 2 TIMES DAILY
Qty: 200 TABLET | Refills: 3 | Status: SHIPPED
Start: 2022-11-14 | End: 2023-01-30 | Stop reason: SDUPTHER

## 2022-11-14 RX ORDER — PANTOPRAZOLE SODIUM 20 MG/1
20 TABLET, DELAYED RELEASE ORAL DAILY
Qty: 100 TABLET | Refills: 3 | Status: SHIPPED | OUTPATIENT
Start: 2022-11-14 | End: 2023-12-14

## 2022-11-14 RX ORDER — MIRABEGRON 25 MG/1
25 TABLET, FILM COATED, EXTENDED RELEASE ORAL EVERY EVENING
Qty: 100 TABLET | Refills: 3 | Status: SHIPPED | OUTPATIENT
Start: 2022-11-14 | End: 2023-02-24 | Stop reason: SDUPTHER

## 2022-11-14 RX ORDER — DEXTROAMPHETAMINE SACCHARATE, AMPHETAMINE ASPARTATE, DEXTROAMPHETAMINE SULFATE AND AMPHETAMINE SULFATE 3.75; 3.75; 3.75; 3.75 MG/1; MG/1; MG/1; MG/1
15 TABLET ORAL 2 TIMES DAILY
Qty: 60 TABLET | Refills: 0 | Status: SHIPPED | OUTPATIENT
Start: 2022-11-25 | End: 2022-12-25

## 2022-11-14 ASSESSMENT — FIBROSIS 4 INDEX: FIB4 SCORE: 1.81

## 2022-11-14 NOTE — ASSESSMENT & PLAN NOTE
Chronic and ongoing problem.  She declines using statin therapy, recent issue with GI bleed so not on aspirin either.  Could discuss trying something like Zetia in the future if she would be open to it.

## 2022-11-14 NOTE — ASSESSMENT & PLAN NOTE
Chronic and ongoing problem, continues to find benefit with mirabegron 25 mg daily, continue at this time. Blood pressure acceptable on current dose.

## 2022-11-14 NOTE — PROGRESS NOTES
Subjective:   Chief Complaint/History of Present Illness:  Cherelle Crystal is a 77 y.o. female established patient who presents today to discuss medical problems as listed below. Cherelle is unaccompanied for today's visit.    Problem   Irritant Contact Dermatitis Due to Other Agents    She was seen in October 2022 in my clinic for dermatitis felt to be due to exposure to lavender/lavender oil. She was given Kenalog cream and has been applying it as needed. She will have marginal improvement but the lesions have not fully gone away. There is associated pruritis. Located on the right upper scalp/hairline. No ongoing exposure to explain why she is still have symptoms. No recent medication changes. No other clear cause for her symptoms.     Iron Deficiency Anemia     Latest Reference Range & Units 08/29/22 11:15   Hemoglobin 12.0 - 16.0 g/dL 10.3 (L)   Hematocrit 37.0 - 47.0 % 31.3 (L)   MCV 81.4 - 97.8 fL 97.2       She had GI bleed noted in Aug 2022. She developed melena and hematochezia at the same time. She was hospitalized with upper endoscopy showing gastritis (no active bleed). Colonoscopy demonstrated 1 small polyp, adenoma. She was placed on PPI and advised to avoid NSAIDs. No follow up labs completed yet. No recurrent episodes.     Primary Hypertension    She has past finding finding of elevated blood pressure on 2 separate clinic visits.  Some may be related to Adderall, mirabegron, and anti-inflammatory use.  She also has increased stress related to her spouse who had a recent recurrent brain bleed.  She is asymptomatic to the elevated blood pressure.  She uses propranolol for tremor but no other dedicated antihypertensives. On follow up in November 2022 readings in clinic are better.    Blood pressure in clinic ranges 126-138/60-80    Current regimen: propranolol 20 mg twice daily     Skin Lesion    She has a raised skin lesion on the left anterolateral neck. She feels this has grown in size recently. It  catches on her necklace and becomes irritated and inflamed. She would like it removed.    She also has dermatitis on the left upper scalp/hairline and requests evaluation with dermatology.     Urgency Incontinence    She reports urge incontinence.  She does not have enough time to get to the bathroom and has to wear a pad due to urinary leakage.  She is never tried any medicines for this.  It has been insidious onset.  No history of recurrent urinary tract infections.  We started Myrbetriq 25 mg daily she notes approximately 90% improvement with this medicine, no noted side effects.    Current regimen: Mirabegron 25 mg daily     Essential Tremor    She reports essential tremor noticeable mostly in the left hand 2nd digit but it can also involve both hands. It has slightly progressed over the past year.  On follow-up in mid November 2020 she reports tremor is becoming more apparent and she be interested in increasing her propranolol.    Current regimen: propranolol 20 mg twice daily  Previous regimen: propranolol 10 mg once daily     Vitamin D Deficiency       Ref. Range 7/12/2021 14:35   25-Hydroxy   Vitamin D 25 Latest Ref Range: 30 - 100 ng/mL 23 (L)       She has a history of vitamin D deficiency, no current supplementation.     Mixed Hyperlipidemia     Latest Reference Range & Units 07/12/21 14:35   Cholesterol,Tot 100 - 199 mg/dL 244 (H)   Triglycerides 0 - 149 mg/dL 151 (H)   HDL >=40 mg/dL 60   LDL <100 mg/dL 154 (H)     The 10-year ASCVD risk score (Steven THRASHER, et al., 2019) is: 22.9%    CT cardiac score (10/2021):  Coronary calcification:  LMA - 47.4  LCX - 0.0  LAD - 0.0  RCA - 0.0  PDA - 0.0     Total Calcium Score: 47.4     Percentile: Calcium score is above the 25th percentile for the patient's age and sex.     IMPRESSION: Calcium Score of 1-99 AND <75th percentile: The severity of disease is more significant as all of the calcification is in the left main coronary artery.    She has a past history of  elevated cholesterol, has not taken medication. She thinks her mother  from an MI but it was related to an accident. She declines statin therapy.     Attention Deficit Hyperactivity Disorder (Adhd), Predominantly Inattentive Type    She reports diagnosis of ADHD at age 41. This was made by her primary care physician, she does not ever recall seeing a psychiatrist. She had challenges with attention related to her job. She was started on Adderall which was increased to 30 mg within the first few months. She denies any side effects from the medicine. 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    She met with psychiatry in 2022 who confirm diagnosis and agree this medicine is appropriate to continue with maximum dose of 30 mg/24 hours.    Current regimen: adderall 15 mg twice daily          Current Medications:  Current Outpatient Medications Ordered in Epic   Medication Sig Dispense Refill    propranolol (INDERAL) 10 MG Tab Take 2 Tablets by mouth 2 times a day. 200 Tablet 3    Mirabegron ER (MYRBETRIQ) 25 MG TABLET SR 24 HR Take 25 mg by mouth every evening. 100 Tablet 3    pantoprazole (PROTONIX) 20 MG tablet Take 1 Tablet by mouth every day. 100 Tablet 3    [START ON 2022] amphetamine-dextroamphetamine (ADDERALL) 15 MG tablet Take 1 Tablet by mouth 2 times a day for 30 days. 60 Tablet 0    [START ON 2022] amphetamine-dextroamphetamine (ADDERALL) 15 MG tablet Take 1 Tablet by mouth 2 times a day for 30 days. 60 Tablet 0    [START ON 2023] amphetamine-dextroamphetamine (ADDERALL) 15 MG tablet Take 1 Tablet by mouth 2 times a day for 30 days. 60 Tablet 0    triamcinolone acetonide (KENALOG) 0.1 % Cream Apply 1 Application topically 2 times a day. 45 g 0    albuterol 108 (90 Base) MCG/ACT Aero Soln inhalation aerosol Inhale 1-2 Puffs every 6 hours as needed for Shortness of Breath.      Ascorbic Acid (VITAMIN C PO) Take 1 Tablet by mouth every evening.       Cholecalciferol (VITAMIN D3 PO) Take 1 Capsule by mouth every evening.       No current Epic-ordered facility-administered medications on file.          Objective:   Physical Exam:    Vitals: /60 (BP Location: Right arm, Patient Position: Sitting, BP Cuff Size: Adult)   Pulse 78   Temp 36.6 °C (97.9 °F) (Temporal)   Resp 12   Wt 69.1 kg (152 lb 4.8 oz)   SpO2 96%    BMI: Body mass index is 27.86 kg/m² (pended).  Physical Exam  Constitutional:       General: She is not in acute distress.     Appearance: Normal appearance. She is not ill-appearing.   HENT:      Right Ear: Tympanic membrane and ear canal normal. There is no impacted cerumen.      Left Ear: Tympanic membrane and ear canal normal. There is no impacted cerumen.   Eyes:      General: No scleral icterus.     Conjunctiva/sclera: Conjunctivae normal.   Cardiovascular:      Rate and Rhythm: Normal rate and regular rhythm.      Pulses: Normal pulses.   Pulmonary:      Effort: Pulmonary effort is normal. No respiratory distress.      Breath sounds: No wheezing or rhonchi.   Musculoskeletal:      Right lower leg: No edema.      Left lower leg: No edema.   Skin:     General: Skin is warm and dry.      Findings: Lesion and rash present. No bruising.      Comments: Raised round lesions scattered on right upper scalp/hairline, difficult to appreciate color with overlying makeup. Not indurated. Not herpetic.   Neurological:      Gait: Gait normal.   Psychiatric:         Mood and Affect: Mood normal.         Behavior: Behavior normal.         Thought Content: Thought content normal.         Judgment: Judgment normal.        Assessment and Plan:   Cherelle is a 77 y.o. female with the following:  Problem List Items Addressed This Visit       Attention deficit hyperactivity disorder (ADHD), predominantly inattentive type     Chronic and stable problem.  Appropriate to continue on current medical therapy, she met with psychiatry who confirmed her diagnosis and  "recommends ongoing use of the Adderall twice daily.  Maximum dose of 30 mg per 24 hours.  Blood pressure is acceptable today, no unintentional weight loss.  No other concerning side effects at this time.    Obtained and reviewed patient utilization report from [facility] pharmacy database on 11/14/2022 7:42 AM  prior to writing prescription for controlled substance II, III or IV per [place] bill . Based on assessment of the report, the prescription is medically necessary.     Patient understands this prescription is a controlled substance which is potentially habit-forming and its use is regulated by the NEIL. We also discussed the new \"black box\" warning regarding the lethal combination of opioids and benzodiazepines. Refills are subject to terms of a controlled substance agreement and patient has an updated one on file. Most recent UDS is appropriate. Any refill requires an office visit. Narcotics have may adverse effects and the risks of addiction, accidental overdose and death were emphasized. Provided prescriptions for the next three months.         Relevant Medications    amphetamine-dextroamphetamine (ADDERALL) 15 MG tablet (Start on 11/25/2022)    amphetamine-dextroamphetamine (ADDERALL) 15 MG tablet (Start on 12/25/2022)    amphetamine-dextroamphetamine (ADDERALL) 15 MG tablet (Start on 1/24/2023)    Essential tremor     Chronic and worsening problem.  We will trial increase propranolol 20 mg twice daily for the next 3 months to see if we get better control of her tremor.  Could also discuss other tremor medications for her to trial.  We will also need to update examination to make sure there are no signs of parkinsonism.  Advised to monitor for fatigue on the higher dose as well as bradycardia and hypotension.  No issues with either those at current time.         Vitamin D deficiency     Chronic and ongoing problem, recheck to ensure improvement and adjust supplementation as indicated.         " Relevant Orders    VITAMIN D,25 HYDROXY (DEFICIENCY)    Mixed hyperlipidemia     Chronic and ongoing problem.  She declines using statin therapy, recent issue with GI bleed so not on aspirin either.  Could discuss trying something like Zetia in the future if she would be open to it.         Relevant Medications    propranolol (INDERAL) 10 MG Tab    Other Relevant Orders    Lipid Profile    Comp Metabolic Panel    VITAMIN D,25 HYDROXY (DEFICIENCY)    VITAMIN B12    TSH WITH REFLEX TO FT4    Urgency incontinence     Chronic and ongoing problem, continues to find benefit with mirabegron 25 mg daily, continue at this time. Blood pressure acceptable on current dose.         Relevant Medications    Mirabegron ER (MYRBETRIQ) 25 MG TABLET SR 24 HR    Skin lesion     Chronic and ongoing problem, she missed appointment with dermatology, she is open to having this renewed to evaluate lesion on left neck and upper scalp. Continue as needed kenalog cream in the meantime, discussed avoiding continuous use as this can thin out the skin.         Relevant Orders    Referral to Dermatology    Primary hypertension     Chronic and improved problem, blood pressure acceptable with propranolol currently used for tremor. Continuing monitoring for need of additional medication.         Relevant Medications    propranolol (INDERAL) 10 MG Tab    Irritant contact dermatitis due to other agents     Relatively new and ongoing/decompensated problem, will refer to dermatology for more thorough evaluation. Discussed use of kenalog to limit no more than 2 weeks in 1 spot so we limit risk of thinning her skin. She agrees to discuss in more detail with dermatology.         Relevant Orders    Referral to Dermatology    Iron deficiency anemia     New problem, hospitalized briefly, underlying etiology of GI bleed not determined. Recheck blood counts and evaluate iron levels to see if any additional replacement would be needed.         Relevant  Medications    pantoprazole (PROTONIX) 20 MG tablet    Other Relevant Orders    CBC WITH DIFFERENTIAL    IRON/TOTAL IRON BIND    FERRITIN     Other Visit Diagnoses       Menopause        Relevant Orders    DS-BONE DENSITY STUDY (DEXA)    Encounter for screening mammogram for breast cancer        Relevant Orders    MA-SCREENING MAMMO BILAT W/TOMOSYNTHESIS W/CAD             RTC: Return in about 3 months (around 2/14/2023).    I spent a total of 42 minutes with record review, exam, communication with the patient, communication with other providers, and documentation of this encounter.    PLEASE NOTE: This dictation was created using voice recognition software. I have made every reasonable attempt to correct obvious errors, but I expect that there are errors of grammar and possibly content that I did not discover before finalizing the note.      Farhana Quintanilla, DO  Geriatric and Internal Medicine  [facility]

## 2022-11-14 NOTE — ASSESSMENT & PLAN NOTE
New problem, hospitalized briefly, underlying etiology of GI bleed not determined. Recheck blood counts and evaluate iron levels to see if any additional replacement would be needed.

## 2022-11-14 NOTE — ASSESSMENT & PLAN NOTE
Chronic and improved problem, blood pressure acceptable with propranolol currently used for tremor. Continuing monitoring for need of additional medication.

## 2022-11-14 NOTE — ASSESSMENT & PLAN NOTE
Chronic and ongoing problem, she missed appointment with dermatology, she is open to having this renewed to evaluate lesion on left neck and upper scalp. Continue as needed kenalog cream in the meantime, discussed avoiding continuous use as this can thin out the skin.

## 2022-11-14 NOTE — ASSESSMENT & PLAN NOTE
"Chronic and stable problem.  Appropriate to continue on current medical therapy, she met with psychiatry who confirmed her diagnosis and recommends ongoing use of the Adderall twice daily.  Maximum dose of 30 mg per 24 hours.  Blood pressure is acceptable today, no unintentional weight loss.  No other concerning side effects at this time.    Obtained and reviewed patient utilization report from [facility] pharmacy database on 11/14/2022 7:42 AM  prior to writing prescription for controlled substance II, III or IV per [place] bill . Based on assessment of the report, the prescription is medically necessary.     Patient understands this prescription is a controlled substance which is potentially habit-forming and its use is regulated by the NEIL. We also discussed the new \"black box\" warning regarding the lethal combination of opioids and benzodiazepines. Refills are subject to terms of a controlled substance agreement and patient has an updated one on file. Most recent UDS is appropriate. Any refill requires an office visit. Narcotics have may adverse effects and the risks of addiction, accidental overdose and death were emphasized. Provided prescriptions for the next three months.  "

## 2022-11-14 NOTE — ASSESSMENT & PLAN NOTE
Relatively new and ongoing/decompensated problem, will refer to dermatology for more thorough evaluation. Discussed use of kenalog to limit no more than 2 weeks in 1 spot so we limit risk of thinning her skin. She agrees to discuss in more detail with dermatology.

## 2022-11-14 NOTE — ASSESSMENT & PLAN NOTE
Chronic and worsening problem.  We will trial increase propranolol 20 mg twice daily for the next 3 months to see if we get better control of her tremor.  Could also discuss other tremor medications for her to trial.  We will also need to update examination to make sure there are no signs of parkinsonism.  Advised to monitor for fatigue on the higher dose as well as bradycardia and hypotension.  No issues with either those at current time.

## 2022-11-14 NOTE — ASSESSMENT & PLAN NOTE
Chronic and ongoing problem, recheck to ensure improvement and adjust supplementation as indicated.

## 2023-01-17 ENCOUNTER — HOSPITAL ENCOUNTER (OUTPATIENT)
Dept: RADIOLOGY | Facility: MEDICAL CENTER | Age: 78
End: 2023-01-17
Attending: INTERNAL MEDICINE
Payer: MEDICARE

## 2023-01-17 DIAGNOSIS — Z12.31 ENCOUNTER FOR SCREENING MAMMOGRAM FOR BREAST CANCER: ICD-10-CM

## 2023-01-17 DIAGNOSIS — Z78.0 MENOPAUSE: ICD-10-CM

## 2023-01-17 PROCEDURE — 77063 BREAST TOMOSYNTHESIS BI: CPT

## 2023-01-17 PROCEDURE — 77080 DXA BONE DENSITY AXIAL: CPT

## 2023-01-26 ENCOUNTER — OFFICE VISIT (OUTPATIENT)
Dept: DERMATOLOGY | Facility: IMAGING CENTER | Age: 78
End: 2023-01-26
Payer: MEDICARE

## 2023-01-26 DIAGNOSIS — L82.0 INFLAMED SEBORRHEIC KERATOSIS: ICD-10-CM

## 2023-01-26 DIAGNOSIS — L23.9 ALLERGIC CONTACT DERMATITIS, UNSPECIFIED TRIGGER: ICD-10-CM

## 2023-01-26 PROCEDURE — 99213 OFFICE O/P EST LOW 20 MIN: CPT | Mod: 25 | Performed by: NURSE PRACTITIONER

## 2023-01-26 PROCEDURE — 17110 DESTRUCTION B9 LES UP TO 14: CPT | Performed by: NURSE PRACTITIONER

## 2023-01-26 NOTE — PROGRESS NOTES
"DERMATOLOGY NOTE  NEW VISIT       Chief complaint: Skin Lesion, Establish Care, and Rash     HPI:rash on forehead  its gone now   Onset: 09/22  Symptoms: red, itchy   Other exposures: allergy to lavender was outside    Treatments: TAC     HPI/location: lesion on lt neck   Time present: \"years\"  Painful lesion: No  Itching lesion: No  Enlarging lesion: Yes  Anything make it better or worse? Necklaces or tight clothing     History of skin cancer: No  History of precancers/actinic keratoses: No  History of biopsies:No  History of blistering/severe sunburns:Yes, Details: lips adult  Family history of skin cancer:No  Family history of atypical moles:No    Allergies   Allergen Reactions    Penicillin G Rash     Rash all over    Lavender Oil Rash           MEDICATIONS:  Medications relevant to specialty reviewed.     REVIEW OF SYSTEMS:   Positive for skin (see HPI)  Negative for fevers and chills       EXAM:  There were no vitals taken for this visit.  Constitutional: Well-developed, well-nourished, and in no distress.     A focused skin exam was performed including the affected areas of the head (including face). Notable findings on exam today listed below and/or in assessment/plan.     No discernible dermatitis to forehead or scalp present on exam today  Carreno waxy stuck on appearing papule to L neck    IMPRESSION / PLAN:    1. Inflamed seborrheic keratosis  - Benign-appearing nature of lesions discussed during exam.   CRYOTHERAPY:  Risks (including, but not limited to: skin discoloration, redness, blister, blood blister, recurrence, need for further treatment, infection, scar) and benefits of cryotherapy discussed. Patient verbally agreed to proceed with treatment. 2 cryotherapy freeze thaw cycles of 10 seconds were applied to 1 lesion on L neck with cryac. Patient tolerated procedure well. Aftercare instructions given--no specific care needed unless irritated during healing process, can apply Vaseline with small band-aid " if needed.  - Advised to continue to monitor for any return to clinic for new or concerning changes.      2. Allergic contact dermatitis, unspecified trigger--resolved  Favored diagnosis, resolved with TAC prescribed by UC/PCP  Likely related to lavender exposure per pt's report  Advised to use TAC as needed, avoid lavender exposure        Discussed risks associated with LN2, Patient verbalized understanding and agrees with plan regarding the above        Please note that this dictation was created using voice recognition software. I have made every reasonable attempt to correct obvious errors, but I expect that there are errors of grammar and possibly content that I did not discover before finalizing the note.      Return to clinic in: Return for PRN. and as needed for any new or changing skin lesions.

## 2023-02-01 ENCOUNTER — TELEMEDICINE (OUTPATIENT)
Dept: MEDICAL GROUP | Facility: PHYSICIAN GROUP | Age: 78
End: 2023-02-01
Payer: MEDICARE

## 2023-02-01 VITALS
SYSTOLIC BLOOD PRESSURE: 130 MMHG | BODY MASS INDEX: 27.98 KG/M2 | RESPIRATION RATE: 13 BRPM | WEIGHT: 153 LBS | DIASTOLIC BLOOD PRESSURE: 80 MMHG

## 2023-02-01 DIAGNOSIS — N39.41 URGENCY INCONTINENCE: ICD-10-CM

## 2023-02-01 DIAGNOSIS — G25.0 ESSENTIAL TREMOR: ICD-10-CM

## 2023-02-01 DIAGNOSIS — F90.0 ATTENTION DEFICIT HYPERACTIVITY DISORDER (ADHD), PREDOMINANTLY INATTENTIVE TYPE: ICD-10-CM

## 2023-02-01 PROCEDURE — 99213 OFFICE O/P EST LOW 20 MIN: CPT | Mod: 95 | Performed by: INTERNAL MEDICINE

## 2023-02-01 RX ORDER — PROPRANOLOL HYDROCHLORIDE 10 MG/1
20 TABLET ORAL 2 TIMES DAILY
Qty: 120 TABLET | Refills: 0 | Status: SHIPPED
Start: 2023-02-01 | End: 2023-02-14 | Stop reason: SDUPTHER

## 2023-02-01 ASSESSMENT — FIBROSIS 4 INDEX: FIB4 SCORE: 1.81

## 2023-02-01 NOTE — ASSESSMENT & PLAN NOTE
Chronic ongoing problem, there was an issue with the pharmacy filling this medicine due to their computer system going down, there was also trouble transferring the prescription so she will plan to check with them again and let me know if there is an issue filling this medicine.

## 2023-02-01 NOTE — ASSESSMENT & PLAN NOTE
Chronic ongoing problem, symptoms improved on mirabegron 25 mg daily, blood pressure at upper limits of normal so will need to keep monitoring regularly.

## 2023-02-01 NOTE — ASSESSMENT & PLAN NOTE
Chronic ongoing problem.  Unfortunately there was a glitch at her usual [facility] store on the computers and they were unable to fill her medication. She is up visiting [place] to ski with friends and her hand tremor is much worse and she would like a prescription called into to a closer pharmacy. Unfortunately the pharmacy she mentioned at [place] does not have a pharmacy. After discussing in more detail she will plan to  this medicine at her usual pharmacy, we will call over there to make sure it is filled and ready for her later this morning.

## 2023-02-01 NOTE — PROGRESS NOTES
Virtual Visit: Established Patient   This visit was conducted via  [employer]  using secure and encrypted videoconferencing technology.   The patient was in a private location outside of their home in the state of [place].    The patient's identity was confirmed and verbal consent was obtained for this virtual visit.    Subjective:   CC: No chief complaint on file.    Cherelle Crystal is a 77 y.o. female presenting for evaluation and management of:    Problem   Urgency Incontinence    She reports urge incontinence.  She does not have enough time to get to the bathroom and has to wear a pad due to urinary leakage.  She is never tried any medicines for this.  It has been insidious onset.  No history of recurrent urinary tract infections.  We started Myrbetriq 25 mg daily she notes approximately 90% improvement with this medicine, no noted side effects.    Current regimen: Mirabegron 25 mg daily     Essential Tremor    She reports essential tremor noticeable mostly in the left hand 2nd digit but it can also involve both hands. It has slightly progressed over the past year.  On follow-up in mid November 2020 she reports tremor is becoming more apparent and she be interested in increasing her propranolol.    Current regimen: propranolol 20 mg twice daily  Previous regimen: propranolol 10 mg once daily     Attention Deficit Hyperactivity Disorder (Adhd), Predominantly Inattentive Type    She reports diagnosis of ADHD at age 41. This was made by her primary care physician, she does not ever recall seeing a psychiatrist. She had challenges with attention related to her job. She was started on Adderall which was increased to 30 mg within the first few months. She denies any side effects from the medicine. 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    She met with psychiatry in April 2022 who confirm diagnosis and agree this medicine is appropriate to continue with maximum dose of 30 mg/24 hours.    Current  regimen: adderall 15 mg twice daily          ROS   Feeling stressed with issues regarding her medications and pharmacy, worsening of hand tremor    Current medicines (including changes today)  Current Outpatient Medications   Medication Sig Dispense Refill    propranolol (INDERAL) 10 MG Tab Take 2 Tablets by mouth 2 times a day. 120 Tablet 0    Mirabegron ER (MYRBETRIQ) 25 MG TABLET SR 24 HR Take 25 mg by mouth every evening. 100 Tablet 3    pantoprazole (PROTONIX) 20 MG tablet Take 1 Tablet by mouth every day. 100 Tablet 3    amphetamine-dextroamphetamine (ADDERALL) 15 MG tablet Take 1 Tablet by mouth 2 times a day for 30 days. 60 Tablet 0    triamcinolone acetonide (KENALOG) 0.1 % Cream Apply 1 Application topically 2 times a day. 45 g 0    albuterol 108 (90 Base) MCG/ACT Aero Soln inhalation aerosol Inhale 1-2 Puffs every 6 hours as needed for Shortness of Breath.      Ascorbic Acid (VITAMIN C PO) Take 1 Tablet by mouth every evening.      Cholecalciferol (VITAMIN D3 PO) Take 1 Capsule by mouth every evening.       No current facility-administered medications for this visit.       Patient Active Problem List    Diagnosis Date Noted    Irritant contact dermatitis due to other agents 11/14/2022    Iron deficiency anemia 11/14/2022    Primary hypertension 05/23/2022    Caregiver stress 05/23/2022    Wheezing 11/16/2021    Skin lesion 11/16/2021    Urgency incontinence 08/25/2021    Chest pain 08/25/2021    Essential tremor 06/14/2021    Vitamin D deficiency 06/14/2021    Mixed hyperlipidemia 06/14/2021    Muscle pain 06/14/2021    Attention deficit hyperactivity disorder (ADHD), predominantly inattentive type 01/07/2021        Objective:   /80   Resp 13   Wt 69.4 kg (153 lb)   BMI (P) 27.98 kg/m²     Physical Exam:  Constitutional: Alert, no distress, well-groomed.  Skin: No rashes in visible areas.  Eye: Round. Conjunctiva clear, lids normal. No icterus.   ENMT: Lips pink without lesions, good dentition,  moist mucous membranes. Phonation normal.  Neck: No masses, no thyromegaly. Moves freely without pain.  Respiratory: Unlabored respiratory effort, no cough or audible wheeze  Psych: Alert and oriented, normal affect and mood.     Assessment and Plan:   The following treatment plan was discussed:     Problem List Items Addressed This Visit       Attention deficit hyperactivity disorder (ADHD), predominantly inattentive type     Chronic ongoing problem, there was an issue with the pharmacy filling this medicine due to their computer system going down, there was also trouble transferring the prescription so she will plan to check with them again and let me know if there is an issue filling this medicine.         Essential tremor     Chronic ongoing problem.  Unfortunately there was a glitch at her usual [facility] store on the computers and they were unable to fill her medication. She is up visiting [place] to ski with friends and her hand tremor is much worse and she would like a prescription called into to a closer pharmacy. Unfortunately the pharmacy she mentioned at [place] does not have a pharmacy. After discussing in more detail she will plan to  this medicine at her usual pharmacy, we will call over there to make sure it is filled and ready for her later this morning.         Relevant Medications    propranolol (INDERAL) 10 MG Tab    Urgency incontinence     Chronic ongoing problem, symptoms improved on mirabegron 25 mg daily, blood pressure at upper limits of normal so will need to keep monitoring regularly.             Follow-up: Return in about 2 weeks (around 2/15/2023).         I spent a total of 22 minutes with record review, exam, communication with the patient, communication with other providers, and documentation of this encounter.    Farhana Quintanilla, DO  Geriatric and Internal Medicine  [facility]

## 2023-02-14 ENCOUNTER — OFFICE VISIT (OUTPATIENT)
Dept: MEDICAL GROUP | Facility: PHYSICIAN GROUP | Age: 78
End: 2023-02-14
Payer: MEDICARE

## 2023-02-14 VITALS
WEIGHT: 151.5 LBS | HEART RATE: 64 BPM | HEIGHT: 62 IN | RESPIRATION RATE: 12 BRPM | SYSTOLIC BLOOD PRESSURE: 132 MMHG | BODY MASS INDEX: 27.88 KG/M2 | OXYGEN SATURATION: 96 % | TEMPERATURE: 97.8 F | DIASTOLIC BLOOD PRESSURE: 70 MMHG

## 2023-02-14 DIAGNOSIS — G25.0 ESSENTIAL TREMOR: ICD-10-CM

## 2023-02-14 DIAGNOSIS — M25.511 CHRONIC RIGHT SHOULDER PAIN: ICD-10-CM

## 2023-02-14 DIAGNOSIS — G89.29 CHRONIC RIGHT SHOULDER PAIN: ICD-10-CM

## 2023-02-14 DIAGNOSIS — F90.0 ATTENTION DEFICIT HYPERACTIVITY DISORDER (ADHD), PREDOMINANTLY INATTENTIVE TYPE: ICD-10-CM

## 2023-02-14 PROCEDURE — 99214 OFFICE O/P EST MOD 30 MIN: CPT | Performed by: INTERNAL MEDICINE

## 2023-02-14 RX ORDER — DEXTROAMPHETAMINE SACCHARATE, AMPHETAMINE ASPARTATE, DEXTROAMPHETAMINE SULFATE AND AMPHETAMINE SULFATE 3.75; 3.75; 3.75; 3.75 MG/1; MG/1; MG/1; MG/1
15 TABLET ORAL 2 TIMES DAILY
Qty: 60 TABLET | Refills: 0 | Status: SHIPPED | OUTPATIENT
Start: 2023-04-07 | End: 2023-05-07

## 2023-02-14 RX ORDER — DEXTROAMPHETAMINE SACCHARATE, AMPHETAMINE ASPARTATE, DEXTROAMPHETAMINE SULFATE AND AMPHETAMINE SULFATE 3.75; 3.75; 3.75; 3.75 MG/1; MG/1; MG/1; MG/1
15 TABLET ORAL 2 TIMES DAILY
Qty: 60 TABLET | Refills: 0 | Status: SHIPPED | OUTPATIENT
Start: 2023-03-08 | End: 2023-04-07

## 2023-02-14 RX ORDER — PROPRANOLOL HYDROCHLORIDE 10 MG/1
TABLET ORAL
Qty: 300 TABLET | Refills: 3 | Status: SHIPPED | OUTPATIENT
Start: 2023-02-14 | End: 2024-01-31

## 2023-02-14 RX ORDER — DEXTROAMPHETAMINE SACCHARATE, AMPHETAMINE ASPARTATE, DEXTROAMPHETAMINE SULFATE AND AMPHETAMINE SULFATE 3.75; 3.75; 3.75; 3.75 MG/1; MG/1; MG/1; MG/1
15 TABLET ORAL 2 TIMES DAILY
Qty: 60 TABLET | Refills: 0 | Status: SHIPPED | OUTPATIENT
Start: 2023-05-07 | End: 2023-06-08

## 2023-02-14 ASSESSMENT — PATIENT HEALTH QUESTIONNAIRE - PHQ9: CLINICAL INTERPRETATION OF PHQ2 SCORE: 0

## 2023-02-14 ASSESSMENT — FIBROSIS 4 INDEX: FIB4 SCORE: 1.81

## 2023-02-14 NOTE — ASSESSMENT & PLAN NOTE
Chronic ongoing problem. Continues to do well on Adderall 15 mg twice daily for ADHD which was diagnosed in her early 40's and reconfirmed by psychiatric evaluation in [place]. Will reorder Adderall 15 mg twice daily for the next 90 days, controlled substance agreement updated and urine drug screen ordered.

## 2023-02-14 NOTE — ASSESSMENT & PLAN NOTE
New decompensated issue, interested in meeting with orthopedics/sport surgeon to pursue imaging evaluation and consider steroid injection if indicated. She will continue with topical modalities of Aspercreme and Salonpas which has been helpful but now is needing to use NSAID's which she is advised to avoid due to melena which occurred in Fall 2022.

## 2023-02-14 NOTE — PROGRESS NOTES
Subjective:   Chief Complaint/History of Present Illness:  Cherelle Crystal is a 77 y.o. female established patient who presents today to discuss medical problems as listed below. Cherelle is unaccompanied for today's visit.    Problem   Chronic Right Shoulder Pain    She reports longstanding discomfort in the right shoulder. 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 Previously topical Salonpas and Aspercreme were enough to help the discomfort but now there is pain at night when she is sleeping and also during the day limiting her activity. No recent imaging and no prior injections or surgeries to the right shoulder. She had a very successful right hip injection years ago.      Essential Tremor    She reports essential tremor noticeable mostly in the left hand 2nd digit but it can also involve both hands. It has slightly progressed over the past years with better control with use of propranolol.    Current regimen: propranolol 20 mg in AM and 10 mg in PM  Previous regimen: propranolol 10 mg once daily     Attention Deficit Hyperactivity Disorder (Adhd), Predominantly Inattentive Type    She reports diagnosis of ADHD at age 41. This was made by her primary care physician, she does not ever recall seeing a psychiatrist. She had challenges with attention related to her job. She was started on Adderall which was increased to 30 mg within the first few months. She denies any side effects from the medicine. 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    She met with psychiatry in April 2022 who confirm diagnosis and agree this medicine is appropriate to continue with maximum dose of 30 mg/24 hours.    Current regimen: adderall 15 mg twice daily          Current Medications:  Current Outpatient Medications Ordered in Epic   Medication Sig Dispense Refill    propranolol (INDERAL) 10 MG Tab Take 2 tabs in the morning and 1 tab in the afternoon 300 Tablet 3    [START ON 3/8/2023]  "amphetamine-dextroamphetamine (ADDERALL) 15 MG tablet Take 1 Tablet by mouth 2 times a day for 30 days. DNF 3/8/23 60 Tablet 0    [START ON 4/7/2023] amphetamine-dextroamphetamine (ADDERALL) 15 MG tablet Take 1 Tablet by mouth 2 times a day for 30 days. DNF 4/7/23 60 Tablet 0    [START ON 5/7/2023] amphetamine-dextroamphetamine (ADDERALL) 15 MG tablet Take 1 Tablet by mouth 2 times a day for 30 days. 60 Tablet 0    Mirabegron ER (MYRBETRIQ) 25 MG TABLET SR 24 HR Take 25 mg by mouth every evening. 100 Tablet 3    pantoprazole (PROTONIX) 20 MG tablet Take 1 Tablet by mouth every day. 100 Tablet 3    triamcinolone acetonide (KENALOG) 0.1 % Cream Apply 1 Application topically 2 times a day. 45 g 0    albuterol 108 (90 Base) MCG/ACT Aero Soln inhalation aerosol Inhale 1-2 Puffs every 6 hours as needed for Shortness of Breath.      Ascorbic Acid (VITAMIN C PO) Take 1 Tablet by mouth every evening.      Cholecalciferol (VITAMIN D3 PO) Take 1 Capsule by mouth every evening.       No current Epic-ordered facility-administered medications on file.          Objective:   Physical Exam:    Vitals: /70 (BP Location: Right arm, Patient Position: Sitting, BP Cuff Size: Adult)   Pulse 64   Temp 36.6 °C (97.8 °F)   Resp 12   Ht 1.575 m (5' 2\")   Wt 68.7 kg (151 lb 8 oz)   SpO2 96%    BMI: Body mass index is 27.71 kg/m².  Physical Exam  Constitutional:       General: She is not in acute distress.     Appearance: Normal appearance. She is not ill-appearing.   Eyes:      Conjunctiva/sclera: Conjunctivae normal.   Pulmonary:      Effort: Pulmonary effort is normal. No respiratory distress.   Musculoskeletal:      Comments: No issues with rotator cuff provoking maneuver. Audible noise with right shoulder abduction and adduction, preserved active ROM.   Skin:     General: Skin is warm and dry.      Findings: No rash.   Psychiatric:         Mood and Affect: Mood normal.         Behavior: Behavior normal.         Thought Content: " Thought content normal.         Judgment: Judgment normal.      Comments: Frustrated with care in hospital this past Fall        Assessment and Plan:   Cherelle is a 77 y.o. female with the following:  Problem List Items Addressed This Visit       Attention deficit hyperactivity disorder (ADHD), predominantly inattentive type     Chronic ongoing problem. Continues to do well on Adderall 15 mg twice daily for ADHD which was diagnosed in her early 40's and reconfirmed by psychiatric evaluation in [place], [place]. Will reorder Adderall 15 mg twice daily for the next 90 days, controlled substance agreement updated and urine drug screen ordered.         Relevant Medications    amphetamine-dextroamphetamine (ADDERALL) 15 MG tablet (Start on 3/8/2023)    amphetamine-dextroamphetamine (ADDERALL) 15 MG tablet (Start on 4/7/2023)    amphetamine-dextroamphetamine (ADDERALL) 15 MG tablet (Start on 5/7/2023)    Other Relevant Orders    Controlled Substance Treatment Agreement    URINE DRUG SCREEN    Essential tremor     Chronic ongoing problem, continue increased dose of propranolol 20 mg in AM and 10 mg in PM to help with essential tremor, no bradycardia or hypotension at this time.         Relevant Medications    propranolol (INDERAL) 10 MG Tab    Chronic right shoulder pain     New decompensated issue, interested in meeting with orthopedics/sport surgeon to pursue imaging evaluation and consider steroid injection if indicated. She will continue with topical modalities of Aspercreme and Salonpas which has been helpful but now is needing to use NSAID's which she is advised to avoid due to melena which occurred in Fall 2022.         Relevant Orders    Referral to Orthopedics        RTC: Return in about 3 months (around 5/14/2023).    I spent a total of 38 minutes with record review, exam, communication with the patient, communication with other providers, and documentation of this encounter.    PLEASE NOTE: This dictation was created  using voice recognition software. I have made every reasonable attempt to correct obvious errors, but I expect that there are errors of grammar and possibly content that I did not discover before finalizing the note.      Farhana Quintanilla, DO  Geriatric and Internal Medicine  [facility]

## 2023-02-14 NOTE — ASSESSMENT & PLAN NOTE
Chronic ongoing problem, continue increased dose of propranolol 20 mg in AM and 10 mg in PM to help with essential tremor, no bradycardia or hypotension at this time.

## 2023-03-14 ENCOUNTER — OFFICE VISIT (OUTPATIENT)
Dept: MEDICAL GROUP | Facility: PHYSICIAN GROUP | Age: 78
End: 2023-03-14
Payer: MEDICARE

## 2023-03-14 VITALS
OXYGEN SATURATION: 97 % | WEIGHT: 154.2 LBS | RESPIRATION RATE: 12 BRPM | HEART RATE: 69 BPM | DIASTOLIC BLOOD PRESSURE: 80 MMHG | TEMPERATURE: 97 F | BODY MASS INDEX: 28.37 KG/M2 | HEIGHT: 62 IN | SYSTOLIC BLOOD PRESSURE: 132 MMHG

## 2023-03-14 DIAGNOSIS — G89.29 CHRONIC RIGHT SHOULDER PAIN: ICD-10-CM

## 2023-03-14 DIAGNOSIS — L24.89 IRRITANT CONTACT DERMATITIS DUE TO OTHER AGENTS: ICD-10-CM

## 2023-03-14 DIAGNOSIS — F90.0 ATTENTION DEFICIT HYPERACTIVITY DISORDER (ADHD), PREDOMINANTLY INATTENTIVE TYPE: ICD-10-CM

## 2023-03-14 DIAGNOSIS — M25.511 CHRONIC RIGHT SHOULDER PAIN: ICD-10-CM

## 2023-03-14 PROCEDURE — 99214 OFFICE O/P EST MOD 30 MIN: CPT | Performed by: INTERNAL MEDICINE

## 2023-03-14 ASSESSMENT — FIBROSIS 4 INDEX: FIB4 SCORE: 1.81

## 2023-03-14 NOTE — ASSESSMENT & PLAN NOTE
Chronic improved problem, doing better after right shoulder injection in early March 2023 by Dr. Stephenson at [facility]. Will reach out to his office to see if her PT referral has been authorized yet, she is pleased with the improvement.

## 2023-03-14 NOTE — ASSESSMENT & PLAN NOTE
Chronic ongoing problem, she currently has 3 prescriptions on file for the next 90 days, she will check with her pharmacy and we will likely need to authorize an early release for the 3rd (May) prescription before she goes on vacation for 4 weeks. She will check with her pharmacy and keep me updated. She will also complete urine drug screen next week with her usual lab work.

## 2023-03-14 NOTE — PROGRESS NOTES
Subjective:   Chief Complaint/History of Present Illness:  Cherelle Crystal is a 77 y.o. female established patient who presents today to discuss medical problems as listed below. Cherelle is unaccompanied for today's visit.    Problem   Chronic Right Shoulder Pain    She reports longstanding discomfort in the right shoulder. 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 Previously topical Salonpas and Aspercreme were enough to help the discomfort but now there is pain at night when she is sleeping and also during the day limiting her activity. No recent imaging and no prior injections or surgeries to the right shoulder. She had a very successful right hip injection years ago.     She received right shoulder injection by Dr. Stephenson at [facility] on 3/2/2023, she had excellent results. Still some aching but plans to pursue PT.     Irritant Contact Dermatitis Due to Other Agents    She was seen in October 2022 in my clinic for dermatitis felt to be due to exposure to lavender/lavender oil. She was given Kenalog cream and has been applying it as needed. She will have marginal improvement but the lesions have not fully gone away. There is associated pruritis. Located on the right upper scalp/hairline. No ongoing exposure to explain why she is still have symptoms. No recent medication changes. No other clear cause for her symptoms.     Attention Deficit Hyperactivity Disorder (Adhd), Predominantly Inattentive Type    She reports diagnosis of ADHD at age 41. This was made by her primary care physician, she does not ever recall seeing a psychiatrist. She had challenges with attention related to her job. She was started on Adderall which was increased to 30 mg within the first few months. She denies any side effects from the medicine. 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    She met with psychiatry in April 2022 who confirm diagnosis and agree this medicine is appropriate to continue  "with maximum dose of 30 mg/24 hours.    Current regimen: adderall 15 mg twice daily          Current Medications:  Current Outpatient Medications Ordered in Epic   Medication Sig Dispense Refill    mirabegron ER (MYRBETRIQ) 25 MG TABLET SR 24 HR Take 1 Tablet by mouth every evening. 30 Tablet 11    propranolol (INDERAL) 10 MG Tab Take 2 tablets in the morning and 1 tablet in the afternoon 300 Tablet 3    amphetamine-dextroamphetamine (ADDERALL) 15 MG tablet Take 1 Tablet by mouth 2 times a day for 30 days. DNF 3/8/23 60 Tablet 0    [START ON 4/7/2023] amphetamine-dextroamphetamine (ADDERALL) 15 MG tablet Take 1 Tablet by mouth 2 times a day for 30 days. DNF 4/7/23 60 Tablet 0    [START ON 5/7/2023] amphetamine-dextroamphetamine (ADDERALL) 15 MG tablet Take 1 Tablet by mouth 2 times a day for 30 days. DNF 5/7/23 60 Tablet 0    pantoprazole (PROTONIX) 20 MG tablet Take 1 Tablet by mouth every day. 100 Tablet 3    albuterol 108 (90 Base) MCG/ACT Aero Soln inhalation aerosol Inhale 1-2 Puffs every 6 hours as needed for Shortness of Breath.      Ascorbic Acid (VITAMIN C PO) Take 1 Tablet by mouth every evening.      Cholecalciferol (VITAMIN D3 PO) Take 1 Capsule by mouth every evening.       No current Epic-ordered facility-administered medications on file.          Objective:   Physical Exam:    Vitals: /80 (BP Location: Left arm, Patient Position: Sitting, BP Cuff Size: Adult)   Pulse 69   Temp 36.1 °C (97 °F) (Temporal)   Resp 12   Ht 1.575 m (5' 2\")   Wt 69.9 kg (154 lb 3.2 oz)   SpO2 97%    BMI: Body mass index is 28.2 kg/m².  Physical Exam  Constitutional:       General: She is not in acute distress.     Appearance: Normal appearance. She is not ill-appearing.   HENT:      Right Ear: Ear canal normal. There is no impacted cerumen.      Left Ear: Ear canal normal. There is no impacted cerumen.   Eyes:      General: No scleral icterus.     Conjunctiva/sclera: Conjunctivae normal.   Cardiovascular:      " Rate and Rhythm: Normal rate and regular rhythm.      Pulses: Normal pulses.   Pulmonary:      Effort: Pulmonary effort is normal. No respiratory distress.      Breath sounds: No wheezing, rhonchi or rales.   Musculoskeletal:      Right lower leg: No edema.      Left lower leg: No edema.   Skin:     General: Skin is warm and dry.      Findings: No bruising or rash.   Neurological:      Gait: Gait normal.   Psychiatric:         Mood and Affect: Mood normal.         Behavior: Behavior normal.         Thought Content: Thought content normal.         Judgment: Judgment normal.        Assessment and Plan:   Cherelle is a 77 y.o. female with the following:  Problem List Items Addressed This Visit       Attention deficit hyperactivity disorder (ADHD), predominantly inattentive type     Chronic ongoing problem, she currently has 3 prescriptions on file for the next 90 days, she will check with her pharmacy and we will likely need to authorize an early release for the 3rd (May) prescription before she goes on vacation for 4 weeks. She will check with her pharmacy and keep me updated. She will also complete urine drug screen next week with her usual lab work.         Chronic right shoulder pain     Chronic improved problem, doing better after right shoulder injection in early March 2023 by Dr. Stephenson at [facility]. Will reach out to his office to see if her PT referral has been authorized yet, she is pleased with the improvement.         Irritant contact dermatitis due to other agents     She reports improvement/resolution after application of topical steroid. No concerns at this time.             RTC: Return in about 3 months (around 6/14/2023).    I spent a total of 32 minutes with record review, exam, communication with the patient, communication with other providers, and documentation of this encounter.    PLEASE NOTE: This dictation was created using voice recognition software. I have made every reasonable attempt to correct  obvious errors, but I expect that there are errors of grammar and possibly content that I did not discover before finalizing the note.      Farhana Quintanilla, DO  Geriatric and Internal Medicine  [facility]

## 2023-03-14 NOTE — ASSESSMENT & PLAN NOTE
She reports improvement/resolution after application of topical steroid. No concerns at this time.

## 2023-04-20 PROCEDURE — RXMED WILLOW AMBULATORY MEDICATION CHARGE: Performed by: INTERNAL MEDICINE

## 2023-04-24 ENCOUNTER — PHARMACY VISIT (OUTPATIENT)
Dept: PHARMACY | Facility: MEDICAL CENTER | Age: 78
End: 2023-04-24
Payer: MEDICARE

## 2023-04-24 ENCOUNTER — HOSPITAL ENCOUNTER (OUTPATIENT)
Facility: MEDICAL CENTER | Age: 78
End: 2023-04-24
Attending: INTERNAL MEDICINE
Payer: MEDICARE

## 2023-04-24 LAB — AMBIGUOUS DTTM AMBI4: NORMAL

## 2023-04-24 PROCEDURE — 80307 DRUG TEST PRSMV CHEM ANLYZR: CPT

## 2023-04-26 LAB
AMPHETAMINES UR QL: POSITIVE NG/ML
BARBITURATES UR QL: NEGATIVE NG/ML
BENZODIAZ UR QL: NEGATIVE NG/ML
CANNABINOIDS UR QL SCN: NEGATIVE NG/ML
COCAINE UR QL: NEGATIVE NG/ML
DRUG SCREEN COMMENT UR-IMP: NORMAL
MDMA CTO UR SCN-MCNC: NEGATIVE NG/ML
METHADONE UR QL: NEGATIVE NG/ML
OPIATES UR QL: NEGATIVE NG/ML
OXYCODONE CTO UR SCN-MCNC: NEGATIVE NG/ML
PCP UR QL SCN: NEGATIVE NG/ML
PROPOXYPH UR QL: NEGATIVE NG/ML

## 2023-05-03 ENCOUNTER — HOSPITAL ENCOUNTER (OUTPATIENT)
Dept: LAB | Facility: MEDICAL CENTER | Age: 78
End: 2023-05-03
Attending: INTERNAL MEDICINE
Payer: MEDICARE

## 2023-05-03 DIAGNOSIS — E55.9 VITAMIN D DEFICIENCY: ICD-10-CM

## 2023-05-03 DIAGNOSIS — E78.2 MIXED HYPERLIPIDEMIA: ICD-10-CM

## 2023-05-03 DIAGNOSIS — D50.9 IRON DEFICIENCY ANEMIA, UNSPECIFIED IRON DEFICIENCY ANEMIA TYPE: ICD-10-CM

## 2023-05-03 LAB
25(OH)D3 SERPL-MCNC: 56 NG/ML (ref 30–100)
BASOPHILS # BLD AUTO: 0.6 % (ref 0–1.8)
BASOPHILS # BLD: 0.04 K/UL (ref 0–0.12)
EOSINOPHIL # BLD AUTO: 0.08 K/UL (ref 0–0.51)
EOSINOPHIL NFR BLD: 1.2 % (ref 0–6.9)
ERYTHROCYTE [DISTWIDTH] IN BLOOD BY AUTOMATED COUNT: 49.1 FL (ref 35.9–50)
FERRITIN SERPL-MCNC: 35.2 NG/ML (ref 10–291)
HCT VFR BLD AUTO: 43.9 % (ref 37–47)
HGB BLD-MCNC: 14 G/DL (ref 12–16)
IMM GRANULOCYTES # BLD AUTO: 0.02 K/UL (ref 0–0.11)
IMM GRANULOCYTES NFR BLD AUTO: 0.3 % (ref 0–0.9)
LYMPHOCYTES # BLD AUTO: 1.17 K/UL (ref 1–4.8)
LYMPHOCYTES NFR BLD: 17.5 % (ref 22–41)
MCH RBC QN AUTO: 30.7 PG (ref 27–33)
MCHC RBC AUTO-ENTMCNC: 31.9 G/DL (ref 33.6–35)
MCV RBC AUTO: 96.3 FL (ref 81.4–97.8)
MONOCYTES # BLD AUTO: 0.67 K/UL (ref 0–0.85)
MONOCYTES NFR BLD AUTO: 10 % (ref 0–13.4)
NEUTROPHILS # BLD AUTO: 4.7 K/UL (ref 2–7.15)
NEUTROPHILS NFR BLD: 70.4 % (ref 44–72)
NRBC # BLD AUTO: 0 K/UL
NRBC BLD-RTO: 0 /100 WBC
PLATELET # BLD AUTO: 246 K/UL (ref 164–446)
PMV BLD AUTO: 12 FL (ref 9–12.9)
RBC # BLD AUTO: 4.56 M/UL (ref 4.2–5.4)
TSH SERPL DL<=0.005 MIU/L-ACNC: 3.39 UIU/ML (ref 0.38–5.33)
VIT B12 SERPL-MCNC: 3581 PG/ML (ref 211–911)
WBC # BLD AUTO: 6.7 K/UL (ref 4.8–10.8)

## 2023-05-03 PROCEDURE — 84443 ASSAY THYROID STIM HORMONE: CPT

## 2023-05-03 PROCEDURE — 36415 COLL VENOUS BLD VENIPUNCTURE: CPT

## 2023-05-03 PROCEDURE — 83540 ASSAY OF IRON: CPT

## 2023-05-03 PROCEDURE — 83550 IRON BINDING TEST: CPT

## 2023-05-03 PROCEDURE — 80061 LIPID PANEL: CPT

## 2023-05-03 PROCEDURE — 80053 COMPREHEN METABOLIC PANEL: CPT

## 2023-05-03 PROCEDURE — 82607 VITAMIN B-12: CPT

## 2023-05-03 PROCEDURE — 85025 COMPLETE CBC W/AUTO DIFF WBC: CPT

## 2023-05-03 PROCEDURE — 82306 VITAMIN D 25 HYDROXY: CPT

## 2023-05-03 PROCEDURE — 82728 ASSAY OF FERRITIN: CPT

## 2023-05-04 LAB
ALBUMIN SERPL BCP-MCNC: 4.4 G/DL (ref 3.2–4.9)
ALBUMIN/GLOB SERPL: 1.6 G/DL
ALP SERPL-CCNC: 59 U/L (ref 30–99)
ALT SERPL-CCNC: 13 U/L (ref 2–50)
ANION GAP SERPL CALC-SCNC: 10 MMOL/L (ref 7–16)
AST SERPL-CCNC: 20 U/L (ref 12–45)
BILIRUB SERPL-MCNC: 0.7 MG/DL (ref 0.1–1.5)
BUN SERPL-MCNC: 11 MG/DL (ref 8–22)
CALCIUM ALBUM COR SERPL-MCNC: 9 MG/DL (ref 8.5–10.5)
CALCIUM SERPL-MCNC: 9.3 MG/DL (ref 8.5–10.5)
CHLORIDE SERPL-SCNC: 102 MMOL/L (ref 96–112)
CHOLEST SERPL-MCNC: 243 MG/DL (ref 100–199)
CO2 SERPL-SCNC: 26 MMOL/L (ref 20–33)
CREAT SERPL-MCNC: 0.59 MG/DL (ref 0.5–1.4)
GFR SERPLBLD CREATININE-BSD FMLA CKD-EPI: 93 ML/MIN/1.73 M 2
GLOBULIN SER CALC-MCNC: 2.8 G/DL (ref 1.9–3.5)
GLUCOSE SERPL-MCNC: 96 MG/DL (ref 65–99)
HDLC SERPL-MCNC: 72 MG/DL
IRON SATN MFR SERPL: 27 % (ref 15–55)
IRON SERPL-MCNC: 89 UG/DL (ref 40–170)
LDLC SERPL CALC-MCNC: 161 MG/DL
POTASSIUM SERPL-SCNC: 4.3 MMOL/L (ref 3.6–5.5)
PROT SERPL-MCNC: 7.2 G/DL (ref 6–8.2)
SODIUM SERPL-SCNC: 138 MMOL/L (ref 135–145)
TIBC SERPL-MCNC: 328 UG/DL (ref 250–450)
TRIGL SERPL-MCNC: 52 MG/DL (ref 0–149)
UIBC SERPL-MCNC: 239 UG/DL (ref 110–370)

## 2023-05-09 ENCOUNTER — PHARMACY VISIT (OUTPATIENT)
Dept: PHARMACY | Facility: MEDICAL CENTER | Age: 78
End: 2023-05-09
Payer: MEDICARE

## 2023-05-09 PROCEDURE — RXMED WILLOW AMBULATORY MEDICATION CHARGE: Performed by: INTERNAL MEDICINE

## 2023-05-15 PROCEDURE — RXMED WILLOW AMBULATORY MEDICATION CHARGE: Performed by: INTERNAL MEDICINE

## 2023-05-19 ENCOUNTER — PHARMACY VISIT (OUTPATIENT)
Dept: PHARMACY | Facility: MEDICAL CENTER | Age: 78
End: 2023-05-19
Payer: MEDICARE

## 2023-06-12 ENCOUNTER — APPOINTMENT (OUTPATIENT)
Dept: MEDICAL GROUP | Facility: PHYSICIAN GROUP | Age: 78
End: 2023-06-12
Payer: MEDICARE

## 2023-06-19 ENCOUNTER — OFFICE VISIT (OUTPATIENT)
Dept: MEDICAL GROUP | Facility: PHYSICIAN GROUP | Age: 78
End: 2023-06-19
Payer: MEDICARE

## 2023-06-19 VITALS
RESPIRATION RATE: 16 BRPM | HEART RATE: 68 BPM | HEIGHT: 62 IN | DIASTOLIC BLOOD PRESSURE: 70 MMHG | TEMPERATURE: 96.9 F | WEIGHT: 152 LBS | SYSTOLIC BLOOD PRESSURE: 128 MMHG | OXYGEN SATURATION: 94 % | BODY MASS INDEX: 27.97 KG/M2

## 2023-06-19 DIAGNOSIS — M25.511 CHRONIC RIGHT SHOULDER PAIN: ICD-10-CM

## 2023-06-19 DIAGNOSIS — G89.29 CHRONIC RIGHT SHOULDER PAIN: ICD-10-CM

## 2023-06-19 DIAGNOSIS — F90.0 ATTENTION DEFICIT HYPERACTIVITY DISORDER (ADHD), PREDOMINANTLY INATTENTIVE TYPE: ICD-10-CM

## 2023-06-19 DIAGNOSIS — E78.2 MIXED HYPERLIPIDEMIA: ICD-10-CM

## 2023-06-19 DIAGNOSIS — I10 PRIMARY HYPERTENSION: ICD-10-CM

## 2023-06-19 PROBLEM — D50.9 IRON DEFICIENCY ANEMIA: Status: RESOLVED | Noted: 2022-11-14 | Resolved: 2023-06-19

## 2023-06-19 PROCEDURE — 3078F DIAST BP <80 MM HG: CPT | Performed by: INTERNAL MEDICINE

## 2023-06-19 PROCEDURE — 3074F SYST BP LT 130 MM HG: CPT | Performed by: INTERNAL MEDICINE

## 2023-06-19 PROCEDURE — 99214 OFFICE O/P EST MOD 30 MIN: CPT | Performed by: INTERNAL MEDICINE

## 2023-06-19 RX ORDER — DEXTROAMPHETAMINE SACCHARATE, AMPHETAMINE ASPARTATE, DEXTROAMPHETAMINE SULFATE AND AMPHETAMINE SULFATE 3.75; 3.75; 3.75; 3.75 MG/1; MG/1; MG/1; MG/1
TABLET ORAL
COMMUNITY
End: 2023-06-29 | Stop reason: SDUPTHER

## 2023-06-19 ASSESSMENT — FIBROSIS 4 INDEX: FIB4 SCORE: 1.71

## 2023-06-19 NOTE — PROGRESS NOTES
Subjective:   Chief Complaint/History of Present Illness:  Cherelle Crystal is a 77 y.o. female established patient who presents today to discuss medical problems as listed below. Cherelle is unaccompanied for today's visit.    Problem   Chronic Right Shoulder Pain    She reports longstanding discomfort in the right shoulder. 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 Previously topical Salonpas and Aspercreme were enough to help the discomfort but now there is pain at night when she is sleeping and also during the day limiting her activity. No recent imaging and no prior injections or surgeries to the right shoulder. She had a very successful right hip injection years ago.     She received right shoulder injection by Dr. Stephenson at [facility] on 3/2/2023, she had excellent results. Still some aching but plans to pursue PT.    She reports shoulder pain continues to stabilize with physical therapy, she will follow-up with orthopedic surgery later this week.     Primary Hypertension    She has past finding finding of elevated blood pressure on 2 separate clinic visits.  Some may be related to Adderall, mirabegron, and anti-inflammatory use.  She also has increased stress related to her spouse who had a recent recurrent brain bleed.  She is asymptomatic to the elevated blood pressure.  She uses propranolol for tremor but no other dedicated antihypertensives. On follow up in November 2022 readings in clinic are better.    Blood pressure in clinic ranges 126-138/60-80    Current regimen: propranolol 20 mg AM and 10 mg PM     Mixed Hyperlipidemia     Latest Reference Range & Units 05/03/23 10:11   Cholesterol,Tot 100 - 199 mg/dL 243 (H)   Triglycerides 0 - 149 mg/dL 52   HDL >=40 mg/dL 72   LDL <100 mg/dL 161 (H)     The 10-year ASCVD risk score (Steven THRASHER, et al., 2019) is: 23.8%    CT cardiac score (10/2021):  Coronary calcification:  LMA - 47.4  LCX - 0.0  LAD - 0.0  RCA - 0.0  PDA - 0.0     Total  Calcium Score: 47.4     Percentile: Calcium score is above the 25th percentile for the patient's age and sex.     IMPRESSION: Calcium Score of 1-99 AND <75th percentile: The severity of disease is more significant as all of the calcification is in the left main coronary artery.    She has a past history of elevated cholesterol, has not taken medication. She thinks her mother  from an MI but it was related to an accident. She declines statin therapy.     Attention Deficit Hyperactivity Disorder (Adhd), Predominantly Inattentive Type    She reports diagnosis of ADHD at age 41. This was made by her primary care physician, she does not ever recall seeing a psychiatrist. She had challenges with attention related to her job. She was started on Adderall which was increased to 30 mg within the first few months. She denies any side effects from the medicine. 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    She met with psychiatry in 2022 who confirm diagnosis and agree this medicine is appropriate to continue with maximum dose of 30 mg/24 hours.  She reports sometimes she forgets to take the second dose in the afternoon or if she is traveling and gets busy on vacation she will sometimes miss a few days.  Continues to find good benefit when she takes it.  No negative sequelae noted.    Current regimen: adderall 15 mg twice daily     Iron Deficiency Anemia (Resolved)     Latest Reference Range & Units 22 11:15   Hemoglobin 12.0 - 16.0 g/dL 10.3 (L)   Hematocrit 37.0 - 47.0 % 31.3 (L)   MCV 81.4 - 97.8 fL 97.2       She had GI bleed noted in Aug 2022. She developed melena and hematochezia at the same time. She was hospitalized with upper endoscopy showing gastritis (no active bleed). Colonoscopy demonstrated 1 small polyp, adenoma. She was placed on PPI and advised to avoid NSAIDs. No follow up labs completed yet. No recurrent episodes.          Current Medications:  Current Outpatient  "Medications Ordered in Epic   Medication Sig Dispense Refill    amphetamine-dextroamphetamine (ADDERALL) 15 MG tablet dextroamphetamine-amphetamine 15 mg tablet      mirabegron ER (MYRBETRIQ) 25 MG TABLET SR 24 HR Take 1 tablet by mouth every evening. 90 Tablet 3    propranolol (INDERAL) 10 MG Tab Take 2 tablets in the morning and 1 tablet in the afternoon 300 Tablet 3    pantoprazole (PROTONIX) 20 MG tablet Take 1 Tablet by mouth every day. 100 Tablet 3    albuterol 108 (90 Base) MCG/ACT Aero Soln inhalation aerosol Inhale 1-2 Puffs every 6 hours as needed for Shortness of Breath.      Ascorbic Acid (VITAMIN C PO) Take 1 Tablet by mouth every evening.      Cholecalciferol (VITAMIN D3 PO) Take 1 Capsule by mouth every evening.       No current Epic-ordered facility-administered medications on file.          Objective:   Physical Exam:    Vitals: /70 (BP Location: Right arm, Patient Position: Sitting, BP Cuff Size: Adult)   Pulse 68   Temp 36.1 °C (96.9 °F) (Temporal)   Resp 16   Ht 1.575 m (5' 2\")   Wt 68.9 kg (152 lb)   SpO2 94%    BMI: Body mass index is 27.8 kg/m².  Physical Exam  Constitutional:       General: She is not in acute distress.     Appearance: Normal appearance. She is not ill-appearing.   HENT:      Right Ear: External ear normal. There is no impacted cerumen.      Left Ear: External ear normal. There is no impacted cerumen.   Eyes:      General: No scleral icterus.     Conjunctiva/sclera: Conjunctivae normal.   Cardiovascular:      Rate and Rhythm: Normal rate and regular rhythm.      Pulses: Normal pulses.      Heart sounds: No murmur heard.  Pulmonary:      Effort: Pulmonary effort is normal. No respiratory distress.      Breath sounds: No wheezing, rhonchi or rales.   Abdominal:      General: Bowel sounds are normal. There is no distension.      Palpations: Abdomen is soft.      Tenderness: There is no abdominal tenderness.   Musculoskeletal:      Right lower leg: No edema.      Left " "lower leg: No edema.   Lymphadenopathy:      Cervical: No cervical adenopathy.   Skin:     General: Skin is warm and dry.      Findings: No rash.   Neurological:      Gait: Gait normal.   Psychiatric:         Mood and Affect: Mood normal.         Behavior: Behavior normal.         Thought Content: Thought content normal.         Judgment: Judgment normal.               Assessment and Plan:   Cherelle is a 77 y.o. female with the following:  Problem List Items Addressed This Visit       Attention deficit hyperactivity disorder (ADHD), predominantly inattentive type     Chronic ongoing problem, continues to do well on Adderall 15 mg twice daily, reviewed with her today and she reports she sometimes misses the second dose later in the day or when she is traveling.  She continues to have good effects when using it.  No negative sequelae such as insomnia, unintentional weight loss, irritability, headache, elevated blood pressure, chest pain, or any other concerns at this time.  Appropriate to continue.    Obtained and reviewed patient utilization report from [facility] pharmacy database on 6/19/2023 7:43 AM  prior to writing prescription for controlled substance II, III or IV per [place] bill . Based on assessment of the report, the prescription is medically necessary.     Patient understands this prescription is a controlled substance which is potentially habit-forming and its use is regulated by the [place]. We also discussed the new \"black box\" warning regarding the lethal combination of opioids and benzodiazepines. Refills are subject to terms of a controlled substance agreement and patient has an updated one on file. Most recent UDS is appropriate. Any refill requires an office visit. Narcotics have may adverse effects and the risks of addiction, accidental overdose and death were emphasized. Provided prescriptions for the next three months.         Relevant Orders    Controlled Substance Treatment Agreement    " Chronic right shoulder pain     Chronic improving problem, with steroid shot and PT at the shoulder pain has improved significantly, she will follow-up with orthopedic surgery later this week and is happy with her physical therapy.         Mixed hyperlipidemia     Chronic ongoing problem.  Continues to have elevated LDL as well as high HDL and normal triglycerides.  She declines statin therapy or aspirin, she reports coronary artery disease does not run in her family.  Reviewed angina and anginal equivalents that should prompt her in for ER evaluation, she voiced understanding.         Primary hypertension     Chronic stable problem, blood pressures well controlled on current medical therapy, continue propranolol 20 mg in the morning and 10 mg in the evening which she uses for tremor but also helps control blood pressure.               RTC: Return in about 3 months (around 9/19/2023).    I spent a total of 32 minutes with record review, exam, communication with the patient, communication with other providers, and documentation of this encounter.    PLEASE NOTE: This dictation was created using voice recognition software. I have made every reasonable attempt to correct obvious errors, but I expect that there are errors of grammar and possibly content that I did not discover before finalizing the note.      Farhana Quintanilla, DO  Geriatric and Internal Medicine  [facility]

## 2023-06-19 NOTE — OR SURGEON
Immediate Post OP Note    PreOp Diagnosis: Melena      PostOp Diagnosis: Erosive gastritis, duodenitis      Procedure(s):  GASTROSCOPY with cold forceps biopsies- Wound Class: None    Surgeon(s):  Rich Agee M.D.    Anesthesiologist/Type of Anesthesia:  Anesthesiologist: Vic Castaneda M.D./SAMANTHA    Surgical Staff:  Circulator: Jimmy Narayanan R.N.  Endoscopy Technician: Matty Sheets; Anthony Villatoro    Specimens removed if any:  ID Type Source Tests Collected by Time Destination   A : Gastric, Biopsy Other Other PATHOLOGY SPECIMEN Rich Agee M.D. 8/29/2022  9:44 AM        Estimated Blood Loss: None    Findings:   Scattered  3-4 mm clean based erosions in gastric antrum, approximately 4 in total.  Biopsies obtained for H pylori  Nonerosive duodenitis with scattered erythema duodenal bulb and sweep  Duodenal lymphangiectasia    Complications: None        8/29/2022 9:48 AM Rich Agee M.D.   19-Jun-2023 07:11

## 2023-06-19 NOTE — ASSESSMENT & PLAN NOTE
Chronic improving problem, with steroid shot and PT at the shoulder pain has improved significantly, she will follow-up with orthopedic surgery later this week and is happy with her physical therapy.

## 2023-06-19 NOTE — ASSESSMENT & PLAN NOTE
Chronic stable problem, blood pressures well controlled on current medical therapy, continue propranolol 20 mg in the morning and 10 mg in the evening which she uses for tremor but also helps control blood pressure.

## 2023-06-19 NOTE — ASSESSMENT & PLAN NOTE
"Chronic ongoing problem, continues to do well on Adderall 15 mg twice daily, reviewed with her today and she reports she sometimes misses the second dose later in the day or when she is traveling.  She continues to have good effects when using it.  No negative sequelae such as insomnia, unintentional weight loss, irritability, headache, elevated blood pressure, chest pain, or any other concerns at this time.  Appropriate to continue.    Obtained and reviewed patient utilization report from [facility] pharmacy database on 6/19/2023 7:43 AM  prior to writing prescription for controlled substance II, III or IV per [place] bill . Based on assessment of the report, the prescription is medically necessary.     Patient understands this prescription is a controlled substance which is potentially habit-forming and its use is regulated by the [place]. We also discussed the new \"black box\" warning regarding the lethal combination of opioids and benzodiazepines. Refills are subject to terms of a controlled substance agreement and patient has an updated one on file. Most recent UDS is appropriate. Any refill requires an office visit. Narcotics have may adverse effects and the risks of addiction, accidental overdose and death were emphasized. Provided prescriptions for the next three months.  "

## 2023-06-19 NOTE — ASSESSMENT & PLAN NOTE
Chronic ongoing problem.  Continues to have elevated LDL as well as high HDL and normal triglycerides.  She declines statin therapy or aspirin, she reports coronary artery disease does not run in her family.  Reviewed angina and anginal equivalents that should prompt her in for ER evaluation, she voiced understanding.

## 2023-06-29 DIAGNOSIS — F90.0 ATTENTION DEFICIT HYPERACTIVITY DISORDER (ADHD), PREDOMINANTLY INATTENTIVE TYPE: ICD-10-CM

## 2023-06-29 PROCEDURE — RXMED WILLOW AMBULATORY MEDICATION CHARGE: Performed by: INTERNAL MEDICINE

## 2023-06-29 RX ORDER — DEXTROAMPHETAMINE SACCHARATE, AMPHETAMINE ASPARTATE, DEXTROAMPHETAMINE SULFATE AND AMPHETAMINE SULFATE 3.75; 3.75; 3.75; 3.75 MG/1; MG/1; MG/1; MG/1
15 TABLET ORAL 2 TIMES DAILY
Qty: 60 TABLET | Refills: 0 | Status: SHIPPED | OUTPATIENT
Start: 2023-06-29 | End: 2023-08-04

## 2023-06-29 RX ORDER — DEXTROAMPHETAMINE SACCHARATE, AMPHETAMINE ASPARTATE, DEXTROAMPHETAMINE SULFATE AND AMPHETAMINE SULFATE 3.75; 3.75; 3.75; 3.75 MG/1; MG/1; MG/1; MG/1
15 TABLET ORAL 2 TIMES DAILY
Qty: 60 TABLET | Refills: 0 | Status: SHIPPED | OUTPATIENT
Start: 2023-08-28 | End: 2023-09-14 | Stop reason: SDUPTHER

## 2023-06-29 RX ORDER — DEXTROAMPHETAMINE SACCHARATE, AMPHETAMINE ASPARTATE, DEXTROAMPHETAMINE SULFATE AND AMPHETAMINE SULFATE 3.75; 3.75; 3.75; 3.75 MG/1; MG/1; MG/1; MG/1
15 TABLET ORAL 2 TIMES DAILY
Qty: 60 TABLET | Refills: 0 | Status: SHIPPED | OUTPATIENT
Start: 2023-07-29 | End: 2023-09-14

## 2023-07-05 ENCOUNTER — PHARMACY VISIT (OUTPATIENT)
Dept: PHARMACY | Facility: MEDICAL CENTER | Age: 78
End: 2023-07-05
Payer: MEDICARE

## 2023-08-24 ENCOUNTER — OFFICE VISIT (OUTPATIENT)
Dept: MEDICAL GROUP | Facility: PHYSICIAN GROUP | Age: 78
End: 2023-08-24
Payer: MEDICARE

## 2023-08-24 ENCOUNTER — PHARMACY VISIT (OUTPATIENT)
Dept: PHARMACY | Facility: MEDICAL CENTER | Age: 78
End: 2023-08-24
Payer: MEDICARE

## 2023-08-24 VITALS
DIASTOLIC BLOOD PRESSURE: 74 MMHG | OXYGEN SATURATION: 96 % | HEIGHT: 62 IN | TEMPERATURE: 97.1 F | WEIGHT: 151.7 LBS | SYSTOLIC BLOOD PRESSURE: 132 MMHG | HEART RATE: 68 BPM | BODY MASS INDEX: 27.92 KG/M2 | RESPIRATION RATE: 16 BRPM

## 2023-08-24 DIAGNOSIS — Z63.6 CAREGIVER STRESS: ICD-10-CM

## 2023-08-24 DIAGNOSIS — F90.0 ATTENTION DEFICIT HYPERACTIVITY DISORDER (ADHD), PREDOMINANTLY INATTENTIVE TYPE: ICD-10-CM

## 2023-08-24 DIAGNOSIS — N39.41 URGENCY INCONTINENCE: ICD-10-CM

## 2023-08-24 DIAGNOSIS — R09.1 PLEURISY: ICD-10-CM

## 2023-08-24 PROCEDURE — RXMED WILLOW AMBULATORY MEDICATION CHARGE: Performed by: INTERNAL MEDICINE

## 2023-08-24 PROCEDURE — 3078F DIAST BP <80 MM HG: CPT | Performed by: INTERNAL MEDICINE

## 2023-08-24 PROCEDURE — 3075F SYST BP GE 130 - 139MM HG: CPT | Performed by: INTERNAL MEDICINE

## 2023-08-24 PROCEDURE — 99214 OFFICE O/P EST MOD 30 MIN: CPT | Performed by: INTERNAL MEDICINE

## 2023-08-24 RX ORDER — MIRABEGRON 25 MG/1
25 TABLET, FILM COATED, EXTENDED RELEASE ORAL EVERY EVENING
Qty: 100 TABLET | Refills: 3 | Status: SHIPPED | OUTPATIENT
Start: 2023-08-24

## 2023-08-24 RX ORDER — PREDNISONE 10 MG/1
TABLET ORAL
Qty: 32 TABLET | Refills: 0 | Status: SHIPPED | OUTPATIENT
Start: 2023-08-24 | End: 2023-09-08

## 2023-08-24 SDOH — SOCIAL STABILITY - SOCIAL INSECURITY: DEPENDENT RELATIVE NEEDING CARE AT HOME: Z63.6

## 2023-08-24 ASSESSMENT — FIBROSIS 4 INDEX: FIB4 SCORE: 1.74

## 2023-08-24 NOTE — PROGRESS NOTES
Subjective:   Chief Complaint/History of Present Illness:  Cherelle Crystal is a 78 y.o. female established patient who presents today to discuss medical problems as listed below. Cherelle is unaccompanied for today's visit.    Problem   Pleurisy    She has developed chest wall tenderness and pain with deep inspiration in the central chest and wrapping under and lateral toward the left breast.  Nonexertional.  Also feels her breathing is a little heavy and tight.  History of pleurisy.  Recently cleaned out her brother's home which was in disarray and although she wore a mask and gloves wonders if she could have inhaled an irritant that has led to most recent episode.  Vital signs are stable.  Sounds a little tight on exam but otherwise no infiltrates or fluid appreciated.     Caregiver Stress    She reports recent increase in stress related to the loss of her sister-in-law who had advanced dementia and needing to go assist her brother.  Her brother lives down in [place] and her niece called her requesting assistance following hospitalization for COVID-pneumonia.  She reports his house was in disarray and she spent much of the time trying to clean up and help support her brother and niece.  She also had some stressors with family when traveling to [place] with 2 of her sisters as well as with her  to [place] for a reunion in setting of all the flight arrangements.  She has been back home for a week now and is trying to take it easy.     Urgency Incontinence    She reports urge incontinence.  She does not have enough time to get to the bathroom and has to wear a pad due to urinary leakage.  She is never tried any medicines for this.  It has been insidious onset.  No history of recurrent urinary tract infections.  We started Myrbetriq 25 mg daily she notes approximately 90% improvement with this medicine, no noted side effects.    Current regimen: Mirabegron 25 mg daily     Attention Deficit Hyperactivity Disorder  (Adhd), Predominantly Inattentive Type    She reports diagnosis of ADHD at age 41. This was made by her primary care physician, she does not ever recall seeing a psychiatrist. She had challenges with attention related to her job. She was started on Adderall which was increased to 30 mg within the first few months. She denies any side effects from the medicine. 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    She met with psychiatry in April 2022 who confirm diagnosis and agree this medicine is appropriate to continue with maximum dose of 30 mg/24 hours.  She reports sometimes she forgets to take the second dose in the afternoon or if she is traveling and gets busy on vacation she will sometimes miss a few days.  Continues to find good benefit when she takes it.  No negative sequelae noted.    Current regimen: adderall 15 mg twice daily          Current Medications:  Current Outpatient Medications Ordered in Epic   Medication Sig Dispense Refill    predniSONE (DELTASONE) 10 MG Tab Take 4 Tablets by mouth every day for 3 days, THEN 3 Tablets every day for 3 days, THEN 2 Tablets every day for 3 days, THEN 1 Tablet every day for 3 days, THEN 0.5 Tablets every day for 3 days. 32 Tablet 0    mirabegron ER (MYRBETRIQ) 25 MG TABLET SR 24 HR Take 1 tablet by mouth every evening. 100 Tablet 3    amphetamine-dextroamphetamine (ADDERALL) 15 MG tablet Take 1 Tablet by mouth 2 times a day for 30 days. 60 Tablet 0    propranolol (INDERAL) 10 MG Tab Take 2 tablets in the morning and 1 tablet in the afternoon 300 Tablet 3    pantoprazole (PROTONIX) 20 MG tablet Take 1 Tablet by mouth every day. 100 Tablet 3    albuterol 108 (90 Base) MCG/ACT Aero Soln inhalation aerosol Inhale 1-2 Puffs every 6 hours as needed for Shortness of Breath.      Ascorbic Acid (VITAMIN C PO) Take 1 Tablet by mouth every evening.      Cholecalciferol (VITAMIN D3 PO) Take 1 Capsule by mouth every evening.      [START ON 8/28/2023]  "amphetamine-dextroamphetamine (ADDERALL) 15 MG tablet Take 1 Tablet by mouth 2 times a day for 30 days. 60 Tablet 0     No current Epic-ordered facility-administered medications on file.          Objective:   Physical Exam:    Vitals: /74 (BP Location: Right arm, Patient Position: Sitting, BP Cuff Size: Adult)   Pulse 68   Temp 36.2 °C (97.1 °F) (Temporal)   Resp 16   Ht 1.575 m (5' 2\")   Wt 68.8 kg (151 lb 11.2 oz)   SpO2 96%    BMI: Body mass index is 27.75 kg/m².  Physical Exam  Constitutional:       General: She is not in acute distress.     Appearance: Normal appearance. She is not ill-appearing.   HENT:      Right Ear: External ear normal.      Left Ear: External ear normal.   Eyes:      General: No scleral icterus.     Conjunctiva/sclera: Conjunctivae normal.   Cardiovascular:      Rate and Rhythm: Normal rate and regular rhythm.      Pulses: Normal pulses.      Heart sounds: No murmur heard.  Pulmonary:      Effort: Pulmonary effort is normal. No respiratory distress.      Breath sounds: No rales.      Comments: Mild coarseness  Abdominal:      General: Bowel sounds are normal. There is no distension.      Palpations: Abdomen is soft.      Tenderness: There is no abdominal tenderness.   Musculoskeletal:      Right lower leg: No edema.      Left lower leg: No edema.   Lymphadenopathy:      Cervical: No cervical adenopathy.   Skin:     General: Skin is warm and dry.      Findings: No rash.   Neurological:      Gait: Gait normal.   Psychiatric:         Behavior: Behavior normal.         Thought Content: Thought content normal.         Judgment: Judgment normal.      Comments: Increased stress recently               Assessment and Plan:   Cherelle is a 78 y.o. female with the following:  Problem List Items Addressed This Visit       Attention deficit hyperactivity disorder (ADHD), predominantly inattentive type     Chronic ongoing problem.  Was unable to fill her last prescription as she has been " traveling out of town for both vacation and then helping family after the sudden loss of her sister-in-law.  She will plan to contact the pharmacy to pick this up.         Caregiver stress     Chronic decompensated problem.  She has had various stressors over the years but now recently she has had several trips which she was in charge of planning as well as sudden loss of her sister-in-law and needed to travel to [place] to help take care of her brother after he was hospitalized.  Provided active listening, encouraged her to take some time for herself over the next week as some of her physical ailments may be related to recent stress and travel.         Pleurisy     New decompensated problem.  We will start with a course of steroids, she will take it with food and continue on pantoprazole.  Discussed side effects to monitor for including increased appetite, insomnia, hyperglycemia, and mood lability.  She will keep me updated on her progress and if no better we will proceed with lab work to check for inflammation markers and chest x-ray.         Relevant Medications    predniSONE (DELTASONE) 10 MG Tab    Other Relevant Orders    DX-CHEST-2 VIEWS    Sed Rate    CRP QUANTITIVE (NON-CARDIAC)    CBC WITH DIFFERENTIAL    Basic Metabolic Panel    Urgency incontinence     Chronic ongoing issue, continues to have improvement of bladder control with use of mirabegron 25 mg daily, continue at this time and will provide a refill.         Relevant Medications    mirabegron ER (MYRBETRIQ) 25 MG TABLET SR 24 HR          RTC: Return in about 3 weeks (around 9/14/2023).    I spent a total of 34 minutes with record review, exam, communication with the patient, communication with other providers, and documentation of this encounter.    PLEASE NOTE: This dictation was created using voice recognition software. I have made every reasonable attempt to correct obvious errors, but I expect that there are errors of grammar and possibly  content that I did not discover before finalizing the note.      Farhana Quintanilla, DO  Geriatric and Internal Medicine  [facility]

## 2023-08-24 NOTE — ASSESSMENT & PLAN NOTE
Chronic decompensated problem.  She has had various stressors over the years but now recently she has had several trips which she was in charge of planning as well as sudden loss of her sister-in-law and needed to travel to [place] to help take care of her brother after he was hospitalized.  Provided active listening, encouraged her to take some time for herself over the next week as some of her physical ailments may be related to recent stress and travel.

## 2023-08-24 NOTE — ASSESSMENT & PLAN NOTE
Chronic ongoing problem.  Was unable to fill her last prescription as she has been traveling out of town for both vacation and then helping family after the sudden loss of her sister-in-law.  She will plan to contact the pharmacy to pick this up.

## 2023-08-24 NOTE — ASSESSMENT & PLAN NOTE
Chronic ongoing issue, continues to have improvement of bladder control with use of mirabegron 25 mg daily, continue at this time and will provide a refill.

## 2023-08-24 NOTE — ASSESSMENT & PLAN NOTE
New decompensated problem.  We will start with a course of steroids, she will take it with food and continue on pantoprazole.  Discussed side effects to monitor for including increased appetite, insomnia, hyperglycemia, and mood lability.  She will keep me updated on her progress and if no better we will proceed with lab work to check for inflammation markers and chest x-ray.

## 2023-08-28 ENCOUNTER — TELEPHONE (OUTPATIENT)
Dept: MEDICAL GROUP | Facility: PHYSICIAN GROUP | Age: 78
End: 2023-08-28
Payer: MEDICARE

## 2023-08-28 NOTE — TELEPHONE ENCOUNTER
----- Message from Hanny Henry, Med Ass't sent at 8/24/2023 10:06 AM PDT -----  Regarding: call  How is she feeling

## 2023-08-28 NOTE — TELEPHONE ENCOUNTER
1. Caller Name: Cherelle Crystal                          Call Back Number: [phone] (home)         How would the patient prefer to be contacted with a response: Phone call OK to leave a detailed message    Feeling a little better but not great.

## 2023-09-14 ENCOUNTER — OFFICE VISIT (OUTPATIENT)
Dept: MEDICAL GROUP | Facility: PHYSICIAN GROUP | Age: 78
End: 2023-09-14
Payer: MEDICARE

## 2023-09-14 VITALS
DIASTOLIC BLOOD PRESSURE: 64 MMHG | BODY MASS INDEX: 28.03 KG/M2 | HEART RATE: 68 BPM | SYSTOLIC BLOOD PRESSURE: 130 MMHG | RESPIRATION RATE: 12 BRPM | OXYGEN SATURATION: 98 % | HEIGHT: 62 IN | WEIGHT: 152.3 LBS | TEMPERATURE: 96.9 F

## 2023-09-14 DIAGNOSIS — R09.1 PLEURISY: ICD-10-CM

## 2023-09-14 DIAGNOSIS — N39.41 URGENCY INCONTINENCE: ICD-10-CM

## 2023-09-14 DIAGNOSIS — F90.0 ATTENTION DEFICIT HYPERACTIVITY DISORDER (ADHD), PREDOMINANTLY INATTENTIVE TYPE: ICD-10-CM

## 2023-09-14 DIAGNOSIS — I10 PRIMARY HYPERTENSION: ICD-10-CM

## 2023-09-14 DIAGNOSIS — E78.2 MIXED HYPERLIPIDEMIA: ICD-10-CM

## 2023-09-14 DIAGNOSIS — Z00.00 ENCOUNTER FOR SUBSEQUENT ANNUAL WELLNESS VISIT (AWV) IN MEDICARE PATIENT: Primary | ICD-10-CM

## 2023-09-14 PROCEDURE — 3075F SYST BP GE 130 - 139MM HG: CPT | Performed by: INTERNAL MEDICINE

## 2023-09-14 PROCEDURE — 3078F DIAST BP <80 MM HG: CPT | Performed by: INTERNAL MEDICINE

## 2023-09-14 PROCEDURE — G0439 PPPS, SUBSEQ VISIT: HCPCS | Performed by: INTERNAL MEDICINE

## 2023-09-14 RX ORDER — DEXTROAMPHETAMINE SACCHARATE, AMPHETAMINE ASPARTATE, DEXTROAMPHETAMINE SULFATE AND AMPHETAMINE SULFATE 3.75; 3.75; 3.75; 3.75 MG/1; MG/1; MG/1; MG/1
15 TABLET ORAL 2 TIMES DAILY
Qty: 60 TABLET | Refills: 0 | Status: SHIPPED | OUTPATIENT
Start: 2023-09-23 | End: 2023-11-05

## 2023-09-14 RX ORDER — DEXTROAMPHETAMINE SACCHARATE, AMPHETAMINE ASPARTATE, DEXTROAMPHETAMINE SULFATE AND AMPHETAMINE SULFATE 3.75; 3.75; 3.75; 3.75 MG/1; MG/1; MG/1; MG/1
15 TABLET ORAL 2 TIMES DAILY
Qty: 60 TABLET | Refills: 0 | Status: SHIPPED | OUTPATIENT
Start: 2023-10-23 | End: 2023-12-06 | Stop reason: SDUPTHER

## 2023-09-14 RX ORDER — DEXTROAMPHETAMINE SACCHARATE, AMPHETAMINE ASPARTATE, DEXTROAMPHETAMINE SULFATE AND AMPHETAMINE SULFATE 3.75; 3.75; 3.75; 3.75 MG/1; MG/1; MG/1; MG/1
15 TABLET ORAL 2 TIMES DAILY
Qty: 60 TABLET | Refills: 0 | Status: SHIPPED | OUTPATIENT
Start: 2023-11-22 | End: 2024-01-17

## 2023-09-14 ASSESSMENT — ACTIVITIES OF DAILY LIVING (ADL): BATHING_REQUIRES_ASSISTANCE: 0

## 2023-09-14 ASSESSMENT — PATIENT HEALTH QUESTIONNAIRE - PHQ9: CLINICAL INTERPRETATION OF PHQ2 SCORE: 0

## 2023-09-14 ASSESSMENT — FIBROSIS 4 INDEX: FIB4 SCORE: 1.74

## 2023-09-14 ASSESSMENT — ENCOUNTER SYMPTOMS: GENERAL WELL-BEING: GOOD

## 2023-09-14 NOTE — PROGRESS NOTES
Chief Complaint   Patient presents with    Annual Wellness Visit       HPI:  Cherelle Crystal is a 78 y.o. here for Medicare Annual Wellness Visit     Problem   Pleurisy    She has developed chest wall tenderness and pain with deep inspiration in the central chest and wrapping under and lateral toward the left breast.  Nonexertional.  Also feels her breathing is a little heavy and tight.  History of pleurisy.  Recently cleaned out her brother's home which was in disarray and although she wore a mask and gloves wonders if she could have inhaled an irritant that has led to most recent episode.  Vital signs are stable.  Sounds a little tight on exam but otherwise no infiltrates or fluid appreciated.    She completed treatment course with oral steroids.  Did help to improve breathing and pleuritic pain however made her essential tremor worse.  In the past when she has had pleurisy is taking upwards of 6 months until she felt completely back to normal.  She is about 1 month out from the exposure to her brother's house in [place] which precipitated this issue.     Primary Hypertension    She has past finding finding of elevated blood pressure on 2 separate clinic visits.  Some may be related to Adderall, mirabegron, and anti-inflammatory use.  She also has increased stress related to her spouse who had a recent recurrent brain bleed.  She is asymptomatic to the elevated blood pressure.  She uses propranolol for tremor but no other dedicated antihypertensives. On follow up in November 2022 readings in clinic are better.    Blood pressure in clinic ranges 126-138/60-80    Current regimen: propranolol 20 mg AM and 10 mg PM     Urgency Incontinence    She reports urge incontinence.  She does not have enough time to get to the bathroom and has to wear a pad due to urinary leakage.  She is never tried any medicines for this.  It has been insidious onset.  No history of recurrent urinary tract infections.  We started Myrbetriq 25  mg daily she notes approximately 90% improvement with this medicine, no noted side effects.    Current regimen: Mirabegron 25 mg daily     Mixed Hyperlipidemia     Latest Reference Range & Units 23 10:11   Cholesterol,Tot 100 - 199 mg/dL 243 (H)   Triglycerides 0 - 149 mg/dL 52   HDL >=40 mg/dL 72   LDL <100 mg/dL 161 (H)     The 10-year ASCVD risk score (Steven THRASHER, et al., 2019) is: 23.8%    CT cardiac score (10/2021):  Coronary calcification:  LMA - 47.4  LCX - 0.0  LAD - 0.0  RCA - 0.0  PDA - 0.0     Total Calcium Score: 47.4     Percentile: Calcium score is above the 25th percentile for the patient's age and sex.     IMPRESSION: Calcium Score of 1-99 AND <75th percentile: The severity of disease is more significant as all of the calcification is in the left main coronary artery.    She has a past history of elevated cholesterol, has not taken medication. She thinks her mother  from an MI but it was related to an accident. She declines statin therapy.     Attention Deficit Hyperactivity Disorder (Adhd), Predominantly Inattentive Type    She reports diagnosis of ADHD at age 41. This was made by her primary care physician, she does not ever recall seeing a psychiatrist. She had challenges with attention related to her job. She was started on Adderall which was increased to 30 mg within the first few months. She denies any side effects from the medicine. 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    She met with psychiatry in 2022 who confirm diagnosis and agree this medicine is appropriate to continue with maximum dose of 30 mg/24 hours.  She reports sometimes she forgets to take the second dose in the afternoon or if she is traveling and gets busy on vacation she will sometimes miss a few days.  Continues to find good benefit when she takes it.  No negative sequelae noted.    Current regimen: adderall 15 mg twice daily           Patient Active Problem List    Diagnosis Date  Noted    Pleurisy 08/24/2023    Chronic right shoulder pain 02/14/2023    Irritant contact dermatitis due to other agents 11/14/2022    Primary hypertension 05/23/2022    Caregiver stress 05/23/2022    Wheezing 11/16/2021    Skin lesion 11/16/2021    Urgency incontinence 08/25/2021    Chest pain 08/25/2021    Essential tremor 06/14/2021    Vitamin D deficiency 06/14/2021    Mixed hyperlipidemia 06/14/2021    Muscle pain 06/14/2021    Attention deficit hyperactivity disorder (ADHD), predominantly inattentive type 01/07/2021       Current Outpatient Medications   Medication Sig Dispense Refill    [START ON 9/23/2023] amphetamine-dextroamphetamine (ADDERALL) 15 MG tablet Take 1 tablet by mouth 2 times a day for 30 days. 60 Tablet 0    [START ON 10/23/2023] amphetamine-dextroamphetamine (ADDERALL) 15 MG tablet Take 1 tablet by mouth 2 times a day for 30 days. 60 Tablet 0    [START ON 11/22/2023] amphetamine-dextroamphetamine (ADDERALL) 15 MG tablet Take 1 tablet by mouth 2 times a day for 30 days. 60 Tablet 0    mirabegron ER (MYRBETRIQ) 25 MG TABLET SR 24 HR Take 1 tablet by mouth every evening. 100 Tablet 3    propranolol (INDERAL) 10 MG Tab Take 2 tablets in the morning and 1 tablet in the afternoon 300 Tablet 3    pantoprazole (PROTONIX) 20 MG tablet Take 1 Tablet by mouth every day. 100 Tablet 3    albuterol 108 (90 Base) MCG/ACT Aero Soln inhalation aerosol Inhale 1-2 Puffs every 6 hours as needed for Shortness of Breath.      Ascorbic Acid (VITAMIN C PO) Take 1 Tablet by mouth every evening.      Cholecalciferol (VITAMIN D3 PO) Take 1 Capsule by mouth every evening.       No current facility-administered medications for this visit.          Current supplements as per medication list.     Allergies: Penicillin g and Lavender oil    Current social contact/activities: Goes to lunch with friends, volunteers, photography     She  reports that she has never smoked. She has never used smokeless tobacco. She reports that  she does not currently use alcohol. She reports that she does not use drugs.  Counseling given: Not Answered      ROS:    Gait: Uses no assistive device  Ostomy: No  Other tubes: No  Amputations: No  Chronic oxygen use: No  Last eye exam: 2022  Wears hearing aids: No   : Reports urinary leakage during the last 6 months that has not interfered at all with their daily activities or sleep.    Screenin  Depression Screening  Little interest or pleasure in doing things?  0 - not at all  Feeling down, depressed , or hopeless? 0 - not at all  Patient Health Questionnaire Score: 0     If depressive symptoms identified deferred to follow up visit unless specifically addressed in assessment and plan.    Interpretation of PHQ-9 Total Score   Score Severity   1-4 No Depression   5-9 Mild Depression   10-14 Moderate Depression   15-19 Moderately Severe Depression   20-27 Severe Depression    Screening for Cognitive Impairment  Do you or any of your friends or family members have any concern about your memory? No  Three Minute Recall (Banana, Sunrise, Chair) 2/3    Raymond clock face with all 12 numbers and set the hands to show 20 past 8.  Yes    Cognitive concerns identified deferred for follow up unless specifically addressed in assessment and plan.    Fall Risk Assessment  Has the patient had two or more falls in the last year or any fall with injury in the last year?  No    Safety Assessment  Do you always wear your seatbelt?  Yes  Any changes to home needed to function safely? No  Difficulty hearing.  Yes  Patient counseled about all safety risks that were identified.    Functional Assessment ADLs  Are there any barriers preventing you from cooking for yourself or meeting nutritional needs?  No.    Are there any barriers preventing you from driving safely or obtaining transportation?  No.    Are there any barriers preventing you from using a telephone or calling for help?  No    Are there any barriers preventing you  from shopping?  No.    Are there any barriers preventing you from taking care of your own finances?  No    Are there any barriers preventing you from managing your medications?  No    Are there any barriers preventing you from showering, bathing or dressing yourself? No    Are there any barriers preventing you from doing housework or laundry? No  Are there any barriers preventing you from using the toilet?No  Are you currently engaging in any exercise or physical activity?  Yes.      Self-Assessment of Health  What is your perception of your health? Good  Do you sleep more than six hours a night? Yes  In the past 7 days, how much did pain keep you from doing your normal work? None  Do you spend quality time with family or friends (virtually or in person)? Yes  Do you usually eat a heart healthy diet that constists of a variety of fruits, vegetables, whole grains and fiber? Yes  Do you eat foods high in fat and/or Fast Food more than three times per week? No    Advance Care Planning  Do you have an Advance Directive, Living Will, Durable Power of , or POLST? Yes  Advance Directive       is not on file - instructed patient to bring in a copy to scan into their chart      Health Maintenance Summary            Postponed - IMM DTaP/Tdap/Td Vaccine (1 - Tdap) Postponed until 6/18/2024      No completion history exists for this topic.              Postponed - COVID-19 Vaccine (2 - Pfizer series) Postponed until 6/19/2024 01/18/2023  Imm Admin: MODERNA BIVALENT BOOSTER SARS-COV-2 VACCINE (6+)    12/18/2021  Imm Admin: MODERNA SARS-COV-2 VACCINE (12+)    03/16/2021  Imm Admin: MODERNA SARS-COV-2 VACCINE (12+)    02/16/2021  Imm Admin: MODERNA SARS-COV-2 VACCINE (12+)              Annual Wellness Visit (Every 366 Days) Next due on 9/14/2024 09/14/2023  Level of Service: ANNUAL WELLNESS VISIT-INCLUDES PPPS SUBSEQUE*              Bone Density Scan (Every 5 Years) Next due on 1/17/2028 01/17/2023  DS-BONE  DENSITY STUDY (DEXA)    01/01/2019  Done              Hepatitis C Screening  Tentatively Complete      07/12/2021  Hepatitis C Antibody component of HEP C VIRUS ANTIBODY              Zoster (Shingles) Vaccines (Series Information) Completed      11/16/2021  Imm Admin: Zoster Vaccine Recombinant (RZV) (SHINGRIX)    07/12/2021  Imm Admin: Zoster Vaccine Recombinant (RZV) (SHINGRIX)              Pneumococcal Vaccine: 65+ Years (Series Information) Completed      05/23/2022  Imm Admin: Pneumococcal Conjugate Vaccine (PCV20)    11/16/2020  Imm Admin: Pneumococcal polysaccharide vaccine (PPSV-23)    09/03/2013  Imm Admin: Pneumococcal polysaccharide vaccine (PPSV-23)              Influenza Vaccine (Series Information) Completed      09/05/2023  Imm Admin: Influenza Vaccine Adult HD    10/11/2022  Imm Admin: Influenza Vaccine Adult HD    02/22/2022  Imm Admin: Influenza Vaccine Adult HD    11/16/2020  Imm Admin: Influenza Vaccine Quad Inj (Pf)    01/03/2020  Imm Admin: Influenza Vaccine Adult HD    Only the first 5 history entries have been loaded, but more history exists.              Hepatitis A Vaccine (Hep A) (Series Information) Aged Out      No completion history exists for this topic.              Hepatitis B Vaccine (Hep B) (Series Information) Aged Out      No completion history exists for this topic.              HPV Vaccines (Series Information) Aged Out      No completion history exists for this topic.              Polio Vaccine (Inactivated Polio) (Series Information) Aged Out      No completion history exists for this topic.              Meningococcal Immunization (Series Information) Aged Out      No completion history exists for this topic.              Discontinued - Mammogram  Discontinued        Frequency changed to Never automatically (Topic No Longer Applies)    01/17/2023  MA-SCREENING MAMMO BILAT W/TOMOSYNTHESIS W/CAD              Discontinued - Colorectal Cancer Screening  Discontinued         "Frequency changed to Never automatically (Topic No Longer Applies)    08/30/2022  Surgical Procedure: LA COLONOSCOPY,DIAGNOSTIC    01/07/2019  Colon Cancer Screening Cologuard Stool (FIT DNA) (Done)                    Patient Care Team:  Farhana Quintanilla D.O. as PCP - General (Internal Medicine)        Social History     Tobacco Use    Smoking status: Never    Smokeless tobacco: Never   Vaping Use    Vaping Use: Never used   Substance Use Topics    Alcohol use: Not Currently    Drug use: Never     Family History   Problem Relation Age of Onset    Allergies Mother     Cancer Father         prostate    Diabetes Sister     Heart Disease Sister     Stroke Brother     Stroke Paternal Grandmother     Cancer Paternal Grandfather         throat    No Known Problems Sister     Hyperlipidemia Son     Heart Disease Daughter     Hyperlipidemia Daughter      She  has a past medical history of Hyperlipidemia, Iron deficiency anemia (11/14/2022), and Leg paresthesia (6/14/2021).   Past Surgical History:   Procedure Laterality Date    LA COLONOSCOPY,DIAGNOSTIC N/A 8/30/2022    Procedure: COLONOSCOPY;  Surgeon: Rich Agee M.D.;  Location: SURGERY [facility];  Service: Gastroenterology    LA UPPER GI ENDOSCOPY,DIAGNOSIS N/A 8/29/2022    Procedure: GASTROSCOPY;  Surgeon: Rich Agee M.D.;  Location: SURGERY [facility];  Service: Gastroenterology       Exam:   /64 (BP Location: Left arm, Patient Position: Sitting, BP Cuff Size: Adult)   Pulse 68   Temp 36.1 °C (96.9 °F) (Temporal)   Resp 12   Ht 1.575 m (5' 2\")   Wt 69.1 kg (152 lb 4.8 oz)   SpO2 98%  Body mass index is 27.86 kg/m².    Hearing good.    Dentition good  Alert, oriented in no acute distress.  Eye contact is good, speech goal directed, affect calm  Physical Exam  Constitutional:       General: She is not in acute distress.     Appearance: Normal appearance. She is not ill-appearing.   HENT:      Right Ear: External ear normal.      " "Left Ear: External ear normal.   Eyes:      General: No scleral icterus.     Conjunctiva/sclera: Conjunctivae normal.   Cardiovascular:      Rate and Rhythm: Normal rate and regular rhythm.      Pulses: Normal pulses.      Heart sounds: No murmur heard.  Pulmonary:      Effort: Pulmonary effort is normal. No respiratory distress.      Breath sounds: No wheezing, rhonchi or rales.   Musculoskeletal:      Right lower leg: No edema.      Left lower leg: No edema.   Lymphadenopathy:      Cervical: No cervical adenopathy.   Skin:     General: Skin is warm and dry.      Findings: No rash.   Neurological:      Gait: Gait normal.   Psychiatric:         Mood and Affect: Mood normal.         Behavior: Behavior normal.         Thought Content: Thought content normal.         Judgment: Judgment normal.         Assessment and Plan. The following treatment and monitoring plan is recommended:      Problem List Items Addressed This Visit       Attention deficit hyperactivity disorder (ADHD), predominantly inattentive type     Chronic ongoing problem.  No side effects from the Adderall, continues to be extremely helpful for quality of life and was previously diagnosed and confirmed to have ADHD by psychiatry both in [place] and here locally.  We will send in prescriptions for the next 3 months.    Obtained and reviewed patient utilization report from [facility] pharmacy database on 9/14/2023 8:58 AM  prior to writing prescription for controlled substance II, III or IV per [place] bill . Based on assessment of the report, the prescription is medically necessary.     Patient understands this prescription is a controlled substance which is potentially habit-forming and its use is regulated by the NEIL. We also discussed the new \"black box\" warning regarding the lethal combination of opioids and benzodiazepines. Refills are subject to terms of a controlled substance agreement and patient has an updated one on file. Most recent UDS is " appropriate. Any refill requires an office visit. Narcotics have may adverse effects and the risks of addiction, accidental overdose and death were emphasized. Provided prescriptions for the next three months.           Relevant Medications    amphetamine-dextroamphetamine (ADDERALL) 15 MG tablet (Start on 9/23/2023)    amphetamine-dextroamphetamine (ADDERALL) 15 MG tablet (Start on 10/23/2023)    amphetamine-dextroamphetamine (ADDERALL) 15 MG tablet (Start on 11/22/2023)    Mixed hyperlipidemia     Chronic ongoing issue, has high running HDL and normal triglycerides with elevated LDL.  No family history of CAD and she has declined statin and aspirin in the past.  Denies any angina or anginal equivalents at this time, discussed ER precautions in the past.         Pleurisy     Chronic improving problem, completed treatment course of steroids, pleurisy initially better and has worsened again a little bit.  Steroids worsen her essential tremors so she would like to avoid taking more of that at this time.  She will keep me updated on her breathing status and if things would worsen she would proceed with chest x-ray and we could consider additional imaging.         Primary hypertension     Chronic ongoing problem, blood pressure currently well controlled, continue propranolol 20 mg in the morning 10 mg in the afternoon, takes this as well for essential tremor.         Urgency incontinence     Chronic stable problem, continue mirabegron 25 mg daily, no noted side effects of high blood pressure high heart rate at this time.          Other Visit Diagnoses       Encounter for subsequent annual wellness visit (AWV) in Medicare patient    -  Primary              Services suggested: No services needed at this time  Health Care Screening: Age-appropriate preventive services recommended by USPTF and ACIP covered by Medicare were discussed today. Services ordered if indicated and agreed upon by the patient.  Referrals offered:  Community-based lifestyle interventions to reduce health risks and promote self-management and wellness, fall prevention, nutrition, physical activity, tobacco-use cessation, weight loss, and mental health services as per orders if indicated.    Discussion today about general wellness and lifestyle habits:    Prevent falls and reduce trip hazards; Cautioned about securing or removing rugs.  Have a working fire alarm and carbon monoxide detector;   Engage in regular physical activity and social activities     Follow-up: Return in about 3 months (around 12/14/2023).    I spent a total of 32 minutes with record review, exam, communication with the patient, communication with other providers, and documentation of this encounter.    Farhana Quintanilla, DO  Geriatric and Internal Medicine  [facility]

## 2023-09-14 NOTE — ASSESSMENT & PLAN NOTE
"Chronic ongoing problem.  No side effects from the Adderall, continues to be extremely helpful for quality of life and was previously diagnosed and confirmed to have ADHD by psychiatry both in [place] and here locally.  We will send in prescriptions for the next 3 months.    Obtained and reviewed patient utilization report from [facility] pharmacy database on 9/14/2023 8:58 AM  prior to writing prescription for controlled substance II, III or IV per [place] bill . Based on assessment of the report, the prescription is medically necessary.     Patient understands this prescription is a controlled substance which is potentially habit-forming and its use is regulated by the NEIL. We also discussed the new \"black box\" warning regarding the lethal combination of opioids and benzodiazepines. Refills are subject to terms of a controlled substance agreement and patient has an updated one on file. Most recent UDS is appropriate. Any refill requires an office visit. Narcotics have may adverse effects and the risks of addiction, accidental overdose and death were emphasized. Provided prescriptions for the next three months.    "

## 2023-09-14 NOTE — ASSESSMENT & PLAN NOTE
Chronic improving problem, completed treatment course of steroids, pleurisy initially better and has worsened again a little bit.  Steroids worsen her essential tremors so she would like to avoid taking more of that at this time.  She will keep me updated on her breathing status and if things would worsen she would proceed with chest x-ray and we could consider additional imaging.

## 2023-09-14 NOTE — ASSESSMENT & PLAN NOTE
Chronic ongoing issue, has high running HDL and normal triglycerides with elevated LDL.  No family history of CAD and she has declined statin and aspirin in the past.  Denies any angina or anginal equivalents at this time, discussed ER precautions in the past.

## 2023-09-14 NOTE — ASSESSMENT & PLAN NOTE
Chronic ongoing problem, blood pressure currently well controlled, continue propranolol 20 mg in the morning 10 mg in the afternoon, takes this as well for essential tremor.

## 2023-09-14 NOTE — ASSESSMENT & PLAN NOTE
Chronic stable problem, continue mirabegron 25 mg daily, no noted side effects of high blood pressure high heart rate at this time.

## 2023-10-04 PROCEDURE — RXMED WILLOW AMBULATORY MEDICATION CHARGE: Performed by: INTERNAL MEDICINE

## 2023-10-06 ENCOUNTER — PHARMACY VISIT (OUTPATIENT)
Dept: PHARMACY | Facility: MEDICAL CENTER | Age: 78
End: 2023-10-06
Payer: COMMERCIAL

## 2023-11-10 PROCEDURE — RXMED WILLOW AMBULATORY MEDICATION CHARGE: Performed by: INTERNAL MEDICINE

## 2023-11-13 ENCOUNTER — PHARMACY VISIT (OUTPATIENT)
Dept: PHARMACY | Facility: MEDICAL CENTER | Age: 78
End: 2023-11-13
Payer: COMMERCIAL

## 2023-12-04 DIAGNOSIS — F90.0 ATTENTION DEFICIT HYPERACTIVITY DISORDER (ADHD), PREDOMINANTLY INATTENTIVE TYPE: ICD-10-CM

## 2023-12-04 PROCEDURE — RXMED WILLOW AMBULATORY MEDICATION CHARGE: Performed by: INTERNAL MEDICINE

## 2023-12-04 RX ORDER — DEXTROAMPHETAMINE SACCHARATE, AMPHETAMINE ASPARTATE, DEXTROAMPHETAMINE SULFATE AND AMPHETAMINE SULFATE 3.75; 3.75; 3.75; 3.75 MG/1; MG/1; MG/1; MG/1
15 TABLET ORAL 2 TIMES DAILY
Qty: 60 TABLET | Refills: 0 | OUTPATIENT
Start: 2023-12-04 | End: 2024-01-03

## 2023-12-04 NOTE — TELEPHONE ENCOUNTER
She picked up 30 day supply on 11/10/2023 so next Rx will not be released until 12/10/2023, that will be her last Rx. We have appointments on 12/6 and 12/12, do we need both of those or can we bump it out?

## 2023-12-06 ENCOUNTER — OFFICE VISIT (OUTPATIENT)
Dept: MEDICAL GROUP | Facility: PHYSICIAN GROUP | Age: 78
End: 2023-12-06
Payer: MEDICARE

## 2023-12-06 ENCOUNTER — APPOINTMENT (OUTPATIENT)
Dept: MEDICAL GROUP | Facility: PHYSICIAN GROUP | Age: 78
End: 2023-12-06
Payer: MEDICARE

## 2023-12-06 ENCOUNTER — PHARMACY VISIT (OUTPATIENT)
Dept: PHARMACY | Facility: MEDICAL CENTER | Age: 78
End: 2023-12-06
Payer: COMMERCIAL

## 2023-12-06 VITALS
TEMPERATURE: 97.3 F | OXYGEN SATURATION: 94 % | DIASTOLIC BLOOD PRESSURE: 70 MMHG | RESPIRATION RATE: 16 BRPM | SYSTOLIC BLOOD PRESSURE: 138 MMHG | BODY MASS INDEX: 28.19 KG/M2 | HEIGHT: 62 IN | HEART RATE: 74 BPM | WEIGHT: 153.2 LBS

## 2023-12-06 DIAGNOSIS — E78.2 MIXED HYPERLIPIDEMIA: ICD-10-CM

## 2023-12-06 DIAGNOSIS — I10 PRIMARY HYPERTENSION: ICD-10-CM

## 2023-12-06 DIAGNOSIS — M79.674 TOE PAIN, RIGHT: ICD-10-CM

## 2023-12-06 DIAGNOSIS — G25.0 ESSENTIAL TREMOR: ICD-10-CM

## 2023-12-06 DIAGNOSIS — F90.0 ATTENTION DEFICIT HYPERACTIVITY DISORDER (ADHD), PREDOMINANTLY INATTENTIVE TYPE: ICD-10-CM

## 2023-12-06 PROCEDURE — 3075F SYST BP GE 130 - 139MM HG: CPT | Performed by: INTERNAL MEDICINE

## 2023-12-06 PROCEDURE — 99214 OFFICE O/P EST MOD 30 MIN: CPT | Performed by: INTERNAL MEDICINE

## 2023-12-06 PROCEDURE — 3078F DIAST BP <80 MM HG: CPT | Performed by: INTERNAL MEDICINE

## 2023-12-06 RX ORDER — DEXTROAMPHETAMINE SACCHARATE, AMPHETAMINE ASPARTATE, DEXTROAMPHETAMINE SULFATE AND AMPHETAMINE SULFATE 3.75; 3.75; 3.75; 3.75 MG/1; MG/1; MG/1; MG/1
15 TABLET ORAL 2 TIMES DAILY
Qty: 60 TABLET | Refills: 0 | Status: SHIPPED | OUTPATIENT
Start: 2024-03-09 | End: 2024-04-08

## 2023-12-06 RX ORDER — DEXTROAMPHETAMINE SACCHARATE, AMPHETAMINE ASPARTATE, DEXTROAMPHETAMINE SULFATE AND AMPHETAMINE SULFATE 3.75; 3.75; 3.75; 3.75 MG/1; MG/1; MG/1; MG/1
15 TABLET ORAL 2 TIMES DAILY
Qty: 60 TABLET | Refills: 0 | Status: SHIPPED | OUTPATIENT
Start: 2024-01-09 | End: 2024-02-25

## 2023-12-06 RX ORDER — DEXTROAMPHETAMINE SACCHARATE, AMPHETAMINE ASPARTATE, DEXTROAMPHETAMINE SULFATE AND AMPHETAMINE SULFATE 3.75; 3.75; 3.75; 3.75 MG/1; MG/1; MG/1; MG/1
15 TABLET ORAL 2 TIMES DAILY
Qty: 60 TABLET | Refills: 0 | Status: SHIPPED | OUTPATIENT
Start: 2024-02-08 | End: 2024-04-14

## 2023-12-06 ASSESSMENT — FIBROSIS 4 INDEX: FIB4 SCORE: 1.74

## 2023-12-06 NOTE — ASSESSMENT & PLAN NOTE
New decompensated problem, she has hypertrophic second toenail on the right foot and some skin thickening callus formation on the distal aspect of the second toe.  Will refer to podiatry to assist with debridement of the toenail callus as well as recommendations for orthotic or footwear to assist with minimizing pain and damage when she is going downhill with hiking.

## 2023-12-06 NOTE — ASSESSMENT & PLAN NOTE
"Chronic ongoing problem, continues to find effectiveness with Adderall and denies any noted side effects. 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    Obtained and reviewed patient utilization report from [facility] pharmacy database on 12/6/2023 10:19 AM  prior to writing prescription for controlled substance II, III or IV per [place] bill . Based on assessment of the report, the prescription is medically necessary.     Patient understands this prescription is a controlled substance which is potentially habit-forming and its use is regulated by the NEIL. We also discussed the new \"black box\" warning regarding the lethal combination of opioids and benzodiazepines. Refills are subject to terms of a controlled substance agreement and patient has an updated one on file. Most recent UDS is appropriate. Any refill requires an office visit. Narcotics have may adverse effects and the risks of addiction, accidental overdose and death were emphasized. Provided prescriptions for the next three months.    "

## 2023-12-06 NOTE — ASSESSMENT & PLAN NOTE
Chronic ongoing problem, notes propranolol does not work great but is somewhat effective, may be interested in trying a different medicine but will wait until after the holidays at our follow-up in April.

## 2023-12-06 NOTE — ASSESSMENT & PLAN NOTE
Chronic ongoing problem, has declined pharmacotherapy with previous discussions, recheck lipid panel at least annually to ensure stability, she will have this done before our follow-up visit.

## 2023-12-06 NOTE — PROGRESS NOTES
Subjective:   Chief Complaint/History of Present Illness:  Cherelle Crystal is a 78 y.o. female established patient who presents today to discuss medical problems as listed below. Cherelle is unaccompanied for today's visit.    Problem   Toe Pain, Right    Right foot second digit with more pain especially with hiking.  Her second toe is longer than her great toe and as a result when she is going downhill hits in the front of her hiking boots and now she has thickening of the toenail and callus formation on the distal aspect which has become uncomfortable.  Is worried if she tries a larger shoe or shoe box that she will not have the stability she needs with her hiking shoes.  Interested in meeting with podiatry to look into treatment options.  When her toenail was trimmed too much it tends to become very raw.     Primary Hypertension    She has past finding finding of elevated blood pressure on 2 separate clinic visits.  Some may be related to Adderall, mirabegron, and anti-inflammatory use.  She also has increased stress related to her spouse who had a recent recurrent brain bleed.  She is asymptomatic to the elevated blood pressure.  She uses propranolol for tremor but no other dedicated antihypertensives. On follow up in November 2022 readings in clinic are better.    Blood pressure in clinic ranges 126-138/60-80    Current regimen: propranolol 20 mg AM and 10 mg PM     Essential Tremor    She reports essential tremor noticeable mostly in the left hand 2nd digit but it can also involve both hands. It has slightly progressed over the past years with better control with use of propranolol.    Current regimen: propranolol 20 mg in AM and 10 mg in PM  Previous regimen: propranolol 10 mg once daily     Mixed Hyperlipidemia     Latest Reference Range & Units 05/03/23 10:11   Cholesterol,Tot 100 - 199 mg/dL 243 (H)   Triglycerides 0 - 149 mg/dL 52   HDL >=40 mg/dL 72   LDL <100 mg/dL 161 (H)     The 10-year ASCVD risk score  (Steven THRASHER, et al., 2019) is: 23.8%    CT cardiac score (10/2021):  Coronary calcification:  LMA - 47.4  LCX - 0.0  LAD - 0.0  RCA - 0.0  PDA - 0.0     Total Calcium Score: 47.4     Percentile: Calcium score is above the 25th percentile for the patient's age and sex.     IMPRESSION: Calcium Score of 1-99 AND <75th percentile: The severity of disease is more significant as all of the calcification is in the left main coronary artery.    She has a past history of elevated cholesterol, has not taken medication. She thinks her mother  from an MI but it was related to an accident. She declines statin therapy.     Attention Deficit Hyperactivity Disorder (Adhd), Predominantly Inattentive Type    She reports diagnosis of ADHD at age 41. This was made by her primary care physician, she does not ever recall seeing a psychiatrist. She had challenges with attention related to her job. She was started on Adderall which was increased to 30 mg within the first few months. She denies any side effects from the medicine. 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    She met with psychiatry in 2022 who confirm diagnosis and agree this medicine is appropriate to continue with maximum dose of 30 mg/24 hours.  She reports sometimes she forgets to take the second dose in the afternoon or if she is traveling and gets busy on vacation she will sometimes miss a few days.  Continues to find good benefit when she takes it.  No negative sequelae noted.    Current regimen: adderall 15 mg twice daily          Current Medications:  Current Outpatient Medications Ordered in Epic   Medication Sig Dispense Refill    [START ON 2024] amphetamine-dextroamphetamine (ADDERALL) 15 MG tablet Take 1 tablet by mouth 2 times a day for 30 days. 60 Tablet 0    [START ON 2024] amphetamine-dextroamphetamine (ADDERALL) 15 MG tablet Take 1 tablet by mouth 2 times a day for 30 days. 60 Tablet 0    [START ON 3/9/2024]  "amphetamine-dextroamphetamine (ADDERALL) 15 MG tablet Take 1 tablet by mouth 2 times a day for 30 days. 60 Tablet 0    amphetamine-dextroamphetamine (ADDERALL) 15 MG tablet Take 1 tablet by mouth 2 times a day for 30 days. 60 Tablet 0    mirabegron ER (MYRBETRIQ) 25 MG TABLET SR 24 HR Take 1 tablet by mouth every evening. 100 Tablet 3    propranolol (INDERAL) 10 MG Tab Take 2 tablets in the morning and 1 tablet in the afternoon 300 Tablet 3    pantoprazole (PROTONIX) 20 MG tablet Take 1 Tablet by mouth every day. 100 Tablet 3    albuterol 108 (90 Base) MCG/ACT Aero Soln inhalation aerosol Inhale 1-2 Puffs every 6 hours as needed for Shortness of Breath.      Ascorbic Acid (VITAMIN C PO) Take 1 Tablet by mouth every evening.      Cholecalciferol (VITAMIN D3 PO) Take 1 Capsule by mouth every evening.       No current Epic-ordered facility-administered medications on file.          Objective:   Physical Exam:    Vitals: /70 (BP Location: Left arm, Patient Position: Sitting, BP Cuff Size: Adult)   Pulse 74   Temp 36.3 °C (97.3 °F) (Temporal)   Resp 16   Ht 1.575 m (5' 2\")   Wt 69.5 kg (153 lb 3.2 oz)   SpO2 94%    BMI: Body mass index is 28.02 kg/m².  Physical Exam  Constitutional:       General: She is not in acute distress.     Appearance: Normal appearance. She is not ill-appearing.   HENT:      Right Ear: External ear normal.      Left Ear: External ear normal.   Eyes:      General: No scleral icterus.     Conjunctiva/sclera: Conjunctivae normal.   Cardiovascular:      Rate and Rhythm: Normal rate and regular rhythm.      Pulses: Normal pulses.   Pulmonary:      Effort: Pulmonary effort is normal. No respiratory distress.      Breath sounds: No wheezing or rhonchi.   Abdominal:      General: Bowel sounds are normal. There is no distension.      Palpations: Abdomen is soft.      Tenderness: There is no abdominal tenderness.   Musculoskeletal:      Right lower leg: No edema.      Left lower leg: No edema. " "  Skin:     General: Skin is warm and dry.      Findings: No rash.      Comments: Thickened toenail and callus on distal aspect of right 2nd toe     Neurological:      Gait: Gait normal.   Psychiatric:         Mood and Affect: Mood normal.         Behavior: Behavior normal.         Thought Content: Thought content normal.         Judgment: Judgment normal.          Assessment and Plan:   Cherelle is a 78 y.o. female with the following:  Problem List Items Addressed This Visit       Attention deficit hyperactivity disorder (ADHD), predominantly inattentive type     Chronic ongoing problem, continues to find effectiveness with Adderall and denies any noted side effects. 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    Obtained and reviewed patient utilization report from [facility] database on 12/6/2023 10:19 AM  prior to writing prescription for controlled substance II, III or IV per [place] bill . Based on assessment of the report, the prescription is medically necessary.     Patient understands this prescription is a controlled substance which is potentially habit-forming and its use is regulated by the [place]. We also discussed the new \"black box\" warning regarding the lethal combination of opioids and benzodiazepines. Refills are subject to terms of a controlled substance agreement and patient has an updated one on file. Most recent UDS is appropriate. Any refill requires an office visit. Narcotics have may adverse effects and the risks of addiction, accidental overdose and death were emphasized. Provided prescriptions for the next three months.           Relevant Medications    amphetamine-dextroamphetamine (ADDERALL) 15 MG tablet (Start on 1/9/2024)    amphetamine-dextroamphetamine (ADDERALL) 15 MG tablet (Start on 2/8/2024)    amphetamine-dextroamphetamine (ADDERALL) 15 MG tablet (Start on 3/9/2024)    Essential tremor     Chronic ongoing problem, notes " propranolol does not work great but is somewhat effective, may be interested in trying a different medicine but will wait until after the holidays at our follow-up in April.         Mixed hyperlipidemia     Chronic ongoing problem, has declined pharmacotherapy with previous discussions, recheck lipid panel at least annually to ensure stability, she will have this done before our follow-up visit.         Relevant Orders    FREE THYROXINE    TSH    VITAMIN B12    VITAMIN D,25 HYDROXY (DEFICIENCY)    Lipid Profile    Comp Metabolic Panel    CBC WITH DIFFERENTIAL    Primary hypertension     Chronic ongoing problem, continue medical therapy with propranolol 20 mg in the morning 10 mg in the evening which she is also using for tremor control.  No noted side effects.         Toe pain, right     New decompensated problem, she has hypertrophic second toenail on the right foot and some skin thickening callus formation on the distal aspect of the second toe.  Will refer to podiatry to assist with debridement of the toenail callus as well as recommendations for orthotic or footwear to assist with minimizing pain and damage when she is going downhill with hiking.         Relevant Orders    Referral to Podiatry          RTC: Return in about 4 months (around 4/6/2024).    I spent a total of 32 minutes with record review, exam, communication with the patient, communication with other providers, and documentation of this encounter.    PLEASE NOTE: This dictation was created using voice recognition software. I have made every reasonable attempt to correct obvious errors, but I expect that there are errors of grammar and possibly content that I did not discover before finalizing the note.      Farhana Quintanilla, DO  Geriatric and Internal Medicine  [facility]

## 2023-12-06 NOTE — ASSESSMENT & PLAN NOTE
Chronic ongoing problem, continue medical therapy with propranolol 20 mg in the morning 10 mg in the evening which she is also using for tremor control.  No noted side effects.

## 2023-12-12 ENCOUNTER — APPOINTMENT (OUTPATIENT)
Dept: MEDICAL GROUP | Facility: PHYSICIAN GROUP | Age: 78
End: 2023-12-12
Payer: MEDICARE

## 2023-12-14 DIAGNOSIS — D50.9 IRON DEFICIENCY ANEMIA, UNSPECIFIED IRON DEFICIENCY ANEMIA TYPE: ICD-10-CM

## 2023-12-14 PROCEDURE — RXMED WILLOW AMBULATORY MEDICATION CHARGE: Performed by: INTERNAL MEDICINE

## 2023-12-14 RX ORDER — PANTOPRAZOLE SODIUM 20 MG/1
20 TABLET, DELAYED RELEASE ORAL DAILY
Qty: 100 TABLET | Refills: 0 | Status: SHIPPED | OUTPATIENT
Start: 2023-12-14 | End: 2024-03-21

## 2023-12-18 ENCOUNTER — PHARMACY VISIT (OUTPATIENT)
Dept: PHARMACY | Facility: MEDICAL CENTER | Age: 78
End: 2023-12-18
Payer: COMMERCIAL

## 2024-01-10 DIAGNOSIS — F90.0 ATTENTION DEFICIT HYPERACTIVITY DISORDER (ADHD), PREDOMINANTLY INATTENTIVE TYPE: ICD-10-CM

## 2024-01-10 RX ORDER — DEXTROAMPHETAMINE SACCHARATE, AMPHETAMINE ASPARTATE, DEXTROAMPHETAMINE SULFATE AND AMPHETAMINE SULFATE 3.75; 3.75; 3.75; 3.75 MG/1; MG/1; MG/1; MG/1
15 TABLET ORAL 2 TIMES DAILY
Qty: 60 TABLET | Refills: 0 | OUTPATIENT
Start: 2024-01-10 | End: 2024-02-09

## 2024-01-24 PROCEDURE — RXMED WILLOW AMBULATORY MEDICATION CHARGE: Performed by: INTERNAL MEDICINE

## 2024-01-26 ENCOUNTER — PHARMACY VISIT (OUTPATIENT)
Dept: PHARMACY | Facility: MEDICAL CENTER | Age: 79
End: 2024-01-26
Payer: COMMERCIAL

## 2024-01-31 DIAGNOSIS — G25.0 ESSENTIAL TREMOR: ICD-10-CM

## 2024-01-31 RX ORDER — PROPRANOLOL HYDROCHLORIDE 10 MG/1
TABLET ORAL
Qty: 400 TABLET | Refills: 3 | Status: SHIPPED | OUTPATIENT
Start: 2024-01-31

## 2024-01-31 NOTE — TELEPHONE ENCOUNTER
Received request via: Patient    Was the patient seen in the last year in this department? Yes    Does the patient have an active prescription (recently filled or refills available) for medication(s) requested? No    Pharmacy Name: [facility]    Does the patient have [facility] and need 100 day supply (blood pressure, diabetes and cholesterol meds only)? Yes, quantity updated to 100 days

## 2024-03-05 ENCOUNTER — APPOINTMENT (OUTPATIENT)
Dept: MEDICAL GROUP | Facility: PHYSICIAN GROUP | Age: 79
End: 2024-03-05
Payer: MEDICARE

## 2024-03-08 PROCEDURE — RXMED WILLOW AMBULATORY MEDICATION CHARGE: Performed by: INTERNAL MEDICINE

## 2024-03-14 PROCEDURE — RXMED WILLOW AMBULATORY MEDICATION CHARGE: Performed by: INTERNAL MEDICINE

## 2024-03-15 ENCOUNTER — PHARMACY VISIT (OUTPATIENT)
Dept: PHARMACY | Facility: MEDICAL CENTER | Age: 79
End: 2024-03-15
Payer: COMMERCIAL

## 2024-03-21 DIAGNOSIS — D50.9 IRON DEFICIENCY ANEMIA, UNSPECIFIED IRON DEFICIENCY ANEMIA TYPE: ICD-10-CM

## 2024-03-21 RX ORDER — PANTOPRAZOLE SODIUM 20 MG/1
20 TABLET, DELAYED RELEASE ORAL DAILY
Qty: 100 TABLET | Refills: 3 | Status: SHIPPED | OUTPATIENT
Start: 2024-03-21

## 2024-03-21 NOTE — TELEPHONE ENCOUNTER
Received request via: Pharmacy    Was the patient seen in the last year in this department? Yes    Does the patient have an active prescription (recently filled or refills available) for medication(s) requested? No    Ph[facility] Name[facility]    Does the patient have [facility] and need 100 day supply (blood pressure, diabetes and cholesterol meds only)? Medication is not for cholesterol, blood pressure or diabetes

## 2024-04-04 ENCOUNTER — TELEPHONE (OUTPATIENT)
Dept: HEALTH INFORMATION MANAGEMENT | Facility: OTHER | Age: 79
End: 2024-04-04
Payer: MEDICARE

## 2024-04-09 ENCOUNTER — APPOINTMENT (OUTPATIENT)
Dept: MEDICAL GROUP | Facility: PHYSICIAN GROUP | Age: 79
End: 2024-04-09
Payer: MEDICARE

## 2024-04-09 VITALS
SYSTOLIC BLOOD PRESSURE: 126 MMHG | WEIGHT: 153 LBS | RESPIRATION RATE: 12 BRPM | TEMPERATURE: 96.8 F | HEART RATE: 68 BPM | DIASTOLIC BLOOD PRESSURE: 80 MMHG | OXYGEN SATURATION: 97 % | HEIGHT: 62 IN | BODY MASS INDEX: 28.16 KG/M2

## 2024-04-09 DIAGNOSIS — G25.0 ESSENTIAL TREMOR: ICD-10-CM

## 2024-04-09 DIAGNOSIS — I10 PRIMARY HYPERTENSION: ICD-10-CM

## 2024-04-09 DIAGNOSIS — F90.0 ATTENTION DEFICIT HYPERACTIVITY DISORDER (ADHD), PREDOMINANTLY INATTENTIVE TYPE: ICD-10-CM

## 2024-04-09 DIAGNOSIS — Z00.00 ENCOUNTER FOR SUBSEQUENT ANNUAL WELLNESS VISIT (AWV) IN MEDICARE PATIENT: Primary | ICD-10-CM

## 2024-04-09 DIAGNOSIS — N39.41 URGENCY INCONTINENCE: ICD-10-CM

## 2024-04-09 PROCEDURE — 99214 OFFICE O/P EST MOD 30 MIN: CPT | Performed by: INTERNAL MEDICINE

## 2024-04-09 PROCEDURE — 3079F DIAST BP 80-89 MM HG: CPT | Performed by: INTERNAL MEDICINE

## 2024-04-09 PROCEDURE — 3074F SYST BP LT 130 MM HG: CPT | Performed by: INTERNAL MEDICINE

## 2024-04-09 RX ORDER — PREDNISONE 10 MG/1
10 TABLET ORAL DAILY
COMMUNITY
End: 2024-04-09

## 2024-04-09 RX ORDER — DEXTROAMPHETAMINE SACCHARATE, AMPHETAMINE ASPARTATE MONOHYDRATE, DEXTROAMPHETAMINE SULFATE AND AMPHETAMINE SULFATE 3.75; 3.75; 3.75; 3.75 MG/1; MG/1; MG/1; MG/1
15 CAPSULE, EXTENDED RELEASE ORAL
Qty: 60 CAPSULE | Refills: 0 | Status: SHIPPED | OUTPATIENT
Start: 2024-06-13 | End: 2024-07-13

## 2024-04-09 RX ORDER — DEXTROAMPHETAMINE SACCHARATE, AMPHETAMINE ASPARTATE MONOHYDRATE, DEXTROAMPHETAMINE SULFATE AND AMPHETAMINE SULFATE 3.75; 3.75; 3.75; 3.75 MG/1; MG/1; MG/1; MG/1
15 CAPSULE, EXTENDED RELEASE ORAL
Qty: 60 CAPSULE | Refills: 0 | Status: SHIPPED | OUTPATIENT
Start: 2024-05-14 | End: 2024-06-13

## 2024-04-09 RX ORDER — PROPRANOLOL HYDROCHLORIDE 10 MG/1
30 TABLET ORAL 2 TIMES DAILY
Qty: 600 TABLET | Refills: 3
Start: 2024-04-09

## 2024-04-09 ASSESSMENT — PATIENT HEALTH QUESTIONNAIRE - PHQ9
5. POOR APPETITE OR OVEREATING: 0 - NOT AT ALL
SUM OF ALL RESPONSES TO PHQ QUESTIONS 1-9: 3
CLINICAL INTERPRETATION OF PHQ2 SCORE: 1

## 2024-04-09 ASSESSMENT — ENCOUNTER SYMPTOMS: GENERAL WELL-BEING: GOOD

## 2024-04-09 ASSESSMENT — ACTIVITIES OF DAILY LIVING (ADL): BATHING_REQUIRES_ASSISTANCE: 0

## 2024-04-09 ASSESSMENT — FIBROSIS 4 INDEX: FIB4 SCORE: 1.74

## 2024-04-09 NOTE — ASSESSMENT & PLAN NOTE
Chronic and worsening problem, she feels like essential tremor is worsening and not responding as well to the propranolol.  Advised her to try a dose increase up to 30 mg twice daily and see how she feels.  If no better then could consider going off propranolol and trying something different such as primidone.  Advised her to reach out to me in the interim if she has any side effects to the higher dose of propranolol.

## 2024-04-09 NOTE — ASSESSMENT & PLAN NOTE
Chronic and stable problem, blood pressure has been doing better for the past 6 months in the normal range, we are increasing propranolol to help with tremor and will keep an eye on heart rate and blood pressure to make sure she is not overtreated.  Her mirabegron can cause increases in heart rate and blood pressure as well as the Adderall.

## 2024-04-09 NOTE — PROGRESS NOTES
Chief Complaint   Patient presents with    Follow-Up     3 month f/v   Med refill   LOV 12/6/24  Labs not done from Dec 23   Printed lab orders for her     Feeling blah, no energy tired.  Does not sleep nore than 2 hours at a time Went skiing at [facility] on Friday     Annual Exam     Annual       HPI:  Cherelle Crystal is a 78 y.o. here for Medicare Annual Wellness Visit     Problem   Primary Hypertension    She has past finding finding of elevated blood pressure on 2 separate clinic visits.  Some may be related to Adderall, mirabegron, and anti-inflammatory use.  She also has increased stress related to her spouse who had a recent recurrent brain bleed.  She is asymptomatic to the elevated blood pressure.  She uses propranolol for tremor but no other dedicated antihypertensives. On follow up in November 2022 readings in clinic are better.    Blood pressure in clinic ranges 126-138/60-80    Current regimen: propranolol 30 mg AM and 30 mg PM     Urgency Incontinence    She reports urge incontinence.  She does not have enough time to get to the bathroom and has to wear a pad due to urinary leakage.  She is never tried any medicines for this.  It has been insidious onset.  No history of recurrent urinary tract infections.  We started Myrbetriq 25 mg daily she notes approximately 90% improvement with this medicine, no noted side effects.    Current regimen: Mirabegron 25 mg daily     Essential Tremor    She reports essential tremor noticeable mostly in the left hand 2nd digit but it can also involve both hands. It has slightly progressed over the past years with better control with use of propranolol.    Feels like tremor is continuing to worsen, advised that she try increasing propranolol from 20 mg twice daily 30 mg twice daily to see if that gets better coverage of tremor.  Could also consider transitioning her onto something like primidone.    Current regimen: propranolol 30 mg in AM and 30 mg in PM  Previous  regimen: propranolol 10 mg once daily     Attention Deficit Hyperactivity Disorder (Adhd), Predominantly Inattentive Type    She reports diagnosis of ADHD at age 41. This was made by her primary care physician, she does not ever recall seeing a psychiatrist. She had challenges with attention related to her job. She was started on Adderall which was increased to 30 mg within the first few months. She denies any side effects from the medicine. 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    She met with psychiatry in April 2022 who confirm diagnosis and agree this medicine is appropriate to continue with maximum dose of 30 mg/24 hours.  She reports sometimes she forgets to take the second dose in the afternoon or if she is traveling and gets busy on vacation she will sometimes miss a few days.  Continues to find good benefit when she takes it.  No negative sequelae noted.    Current regimen: adderall 15 mg twice daily           Patient Active Problem List    Diagnosis Date Noted    Toe pain, right 12/06/2023    Pleurisy 08/24/2023    Chronic right shoulder pain 02/14/2023    Irritant contact dermatitis due to other agents 11/14/2022    Primary hypertension 05/23/2022    Caregiver stress 05/23/2022    Wheezing 11/16/2021    Skin lesion 11/16/2021    Urgency incontinence 08/25/2021    Chest pain 08/25/2021    Essential tremor 06/14/2021    Vitamin D deficiency 06/14/2021    Mixed hyperlipidemia 06/14/2021    Muscle pain 06/14/2021    Attention deficit hyperactivity disorder (ADHD), predominantly inattentive type 01/07/2021       Current Outpatient Medications   Medication Sig Dispense Refill    propranolol (INDERAL) 10 MG Tab Take 3 Tablets by mouth 2 times a day. 600 Tablet 3    [START ON 5/14/2024] amphetamine-dextroamphetamine (ADDERALL XR) 15 MG XR capsule Take 1 Capsule by mouth 2 times a day for 30 days. 60 Capsule 0    [START ON 6/13/2024] amphetamine-dextroamphetamine (ADDERALL XR) 15 MG XR  capsule Take 1 Capsule by mouth 2 times a day for 30 days. 60 Capsule 0    pantoprazole (PROTONIX) 20 MG tablet TAKE 1 TABLET BY MOUTH EVERY  Tablet 3    amphetamine-dextroamphetamine (ADDERALL) 15 MG tablet Take 1 tablet by mouth 2 times a day for 30 days. 60 Tablet 0    mirabegron ER (MYRBETRIQ) 25 MG TABLET SR 24 HR Take 1 tablet by mouth every evening. 100 Tablet 3    albuterol 108 (90 Base) MCG/ACT Aero Soln inhalation aerosol Inhale 1-2 Puffs every 6 hours as needed for Shortness of Breath.       No current facility-administered medications for this visit.          Current supplements as per medication list.     Allergies: Penicillin g and Lavender oil    Current social contact/activities:      She  reports that she has never smoked. She has never used smokeless tobacco. She reports that she does not currently use alcohol. She reports that she does not use drugs.  Counseling given: Not Answered      ROS:    Gait: Uses no assistive device  Ostomy: No  Other tubes: No  Amputations: No  Chronic oxygen use: No  Last eye exam: 3 years ago   Wears hearing aids: No   : Reports urinary leakage during the last 6 months that has somewhat interfered with their daily activities or sleep.    Screening:    Depression Screening  Little interest or pleasure in doing things?  0 - not at all  Feeling down, depressed , or hopeless? 1 - several days  Trouble falling or staying asleep, or sleeping too much?  1 - several days  Feeling tired or having little energy?  1 - several days  Poor appetite or overeating?  0 - not at all  Feeling bad about yourself - or that you are a failure or have let yourself or your family down? 0 - not at all  Trouble concentrating on things, such as reading the newspaper or watching television? 0 - not at all  Moving or speaking so slowly that other people could have noticed.  Or the opposite - being so fidgety or restless that you have been moving around a lot more than usual?  0 - not at  all  Thoughts that you would be better off dead, or of hurting yourself?  0 - not at all  Patient Health Questionnaire Score: 3    If depressive symptoms identified deferred to follow up visit unless specifically addressed in assessment and plan.    Interpretation of PHQ-9 Total Score   Score Severity   1-4 No Depression   5-9 Mild Depression   10-14 Moderate Depression   15-19 Moderately Severe Depression   20-27 Severe Depression    Screening for Cognitive Impairment  Do you or any of your friends or family members have any concern about your memory? No  Three Minute Recall (Leader, Season, Table) 2/3 Forgot leader   Raymond clock face with all 12 numbers and set the hands to show 10 minutes after 11.  Yes    Cognitive concerns identified deferred for follow up unless specifically addressed in assessment and plan.    Fall Risk Assessment  Has the patient had two or more falls in the last year or any fall with injury in the last year?  No    Safety Assessment  Do you always wear your seatbelt?  Yes  Any changes to home needed to function safely? No  Difficulty hearing.  No  Patient counseled about all safety risks that were identified.    Functional Assessment ADLs  Are there any barriers preventing you from cooking for yourself or meeting nutritional needs?  No.    Are there any barriers preventing you from driving safely or obtaining transportation?  No.    Are there any barriers preventing you from using a telephone or calling for help?  No    Are there any barriers preventing you from shopping?  No.    Are there any barriers preventing you from taking care of your own finances?  No    Are there any barriers preventing you from managing your medications?  No    Are there any barriers preventing you from showering, bathing or dressing yourself? No    Are there any barriers preventing you from doing housework or laundry? No    Are there any barriers preventing you from using the toilet?No    Are you currently  engaging in any exercise or physical activity?  Yes. Work in garden       Self-Assessment of Health  What is your perception of your health? Good    Do you sleep more than six hours a night? No    In the past 7 days, how much did pain keep you from doing your normal work? None    Do you spend quality time with family or friends (virtually or in person)? Yes    Do you usually eat a heart healthy diet that constists of a variety of fruits, vegetables, whole grains and fiber? Yes    Do you eat foods high in fat and/or Fast Food more than three times per week? No    How concerned are you that your medical conditions are not being well managed? Not at all    Are you worried that in the next 2 months, you may not have stable housing that you own, rent, or stay in as part of a household? No        Advance Care Planning  Do you have an Advance Directive, Living Will, Durable Power of , or POLST? No                 Health Maintenance Summary            Overdue - COVID-19 Vaccine (4 - 2023-24 season) Overdue since 9/1/2023 01/18/2023  Imm Admin: MODERNA BIVALENT BOOSTER SARS-COV-2 VACCINE (6+)    12/18/2021  Imm Admin: MODERNA SARS-COV-2 VACCINE (12+)    03/16/2021  Imm Admin: MODERNA SARS-COV-2 VACCINE (12+)    02/16/2021  Imm Admin: MODERNA SARS-COV-2 VACCINE (12+)              Postponed - IMM DTaP/Tdap/Td Vaccine (1 - Tdap) Postponed until 6/18/2024      No completion history exists for this topic.              Annual Wellness Visit (Yearly) Next due on 4/9/2025 04/09/2024  Level of Service: ANNUAL WELLNESS VISIT-INCLUDES PPPS SUBSEQUE*    09/14/2023  Level of Service: [place] ANNUAL WELLNESS VISIT-INCLUDES PPPS SUBSEQUE*              Bone Density Scan (Every 5 Years) Next due on 1/17/2028 01/17/2023  DS-BONE DENSITY STUDY (DEXA)    01/01/2019  Done              Hepatitis C Screening  Tentatively Complete      07/12/2021  Hepatitis C Antibody component of HEP C VIRUS ANTIBODY              Zoster  (Shingles) Vaccines (Series Information) Completed      11/16/2021  Imm Admin: Zoster Vaccine Recombinant (RZV) (SHINGRIX)    07/12/2021  Imm Admin: Zoster Vaccine Recombinant (RZV) (SHINGRIX)              Pneumococcal Vaccine: 65+ Years (Series Information) Completed      05/23/2022  Imm Admin: Pneumococcal Conjugate Vaccine (PCV20)    11/16/2020  Imm Admin: Pneumococcal polysaccharide vaccine (PPSV-23)    09/03/2013  Imm Admin: Pneumococcal polysaccharide vaccine (PPSV-23)              Influenza Vaccine (Series Information) Completed      09/05/2023  Imm Admin: Influenza Vaccine Adult HD    10/11/2022  Imm Admin: Influenza Vaccine Adult HD    02/22/2022  Imm Admin: Influenza Vaccine Adult HD    11/16/2020  Imm Admin: Influenza Vaccine Quad Inj (Pf)    01/03/2020  Imm Admin: Influenza Vaccine Adult HD    Only the first 5 history entries have been loaded, but more history exists.              Hepatitis A Vaccine (Hep A) (Series Information) Aged Out      No completion history exists for this topic.              Hepatitis B Vaccine (Hep B) (Series Information) Aged Out      No completion history exists for this topic.              HPV Vaccines (Series Information) Aged Out      No completion history exists for this topic.              Polio Vaccine (Inactivated Polio) (Series Information) Aged Out      No completion history exists for this topic.              Meningococcal Immunization (Series Information) Aged Out      No completion history exists for this topic.              Discontinued - Mammogram  Discontinued        Frequency changed to Never automatically (Topic No Longer Applies)    01/17/2023  MA-SCREENING MAMMO BILAT W/TOMOSYNTHESIS W/CAD              Discontinued - Colorectal Cancer Screening  Discontinued        Frequency changed to Never automatically (Topic No Longer Applies)    08/30/2022  Surgical Procedure: MO COLONOSCOPY,DIAGNOSTIC    01/07/2019  Colon Cancer Screening Cologuard Stool (FIT DNA)  "(Done)                    Patient Care Team:  Farhana Quintanilla D.O. as PCP - General (Internal Medicine)      Social History     Tobacco Use    Smoking status: Never    Smokeless tobacco: Never   Vaping Use    Vaping Use: Never used   Substance Use Topics    Alcohol use: Not Currently    Drug use: Never     Family History   Problem Relation Age of Onset    Allergies Mother     Cancer Father         prostate    Diabetes Sister     Heart Disease Sister     Stroke Brother     Stroke Paternal Grandmother     Cancer Paternal Grandfather         throat    No Known Problems Sister     Hyperlipidemia Son     Heart Disease Daughter     Hyperlipidemia Daughter      She  has a past medical history of Hyperlipidemia, Iron deficiency anemia (11/14/2022), and Leg paresthesia (6/14/2021).   Past Surgical History:   Procedure Laterality Date    NH COLONOSCOPY,DIAGNOSTIC N/A 8/30/2022    Procedure: COLONOSCOPY;  Surgeon: Rich Agee M.D.;  Location: SURGERY [facility];  Service: Gastroenterology    NH UPPER GI ENDOSCOPY,DIAGNOSIS N/A 8/29/2022    Procedure: GASTROSCOPY;  Surgeon: Rich Agee M.D.;  Location: [facility];  Service: Gastroenterology       Exam:   /80 (BP Location: Left arm, Patient Position: Sitting, BP Cuff Size: Adult)   Pulse 68   Temp 36 °C (96.8 °F) (Temporal)   Resp 12   Ht 1.575 m (5' 2\")   Wt 69.4 kg (153 lb)   SpO2 97%  Body mass index is 27.98 kg/m².    Hearing good.    Dentition good  Alert, oriented in no acute distress.  Eye contact is good, speech goal directed, affect calm    Assessment and Plan. The following treatment and monitoring plan is recommended:    Problem List Items Addressed This Visit       Attention deficit hyperactivity disorder (ADHD), predominantly inattentive type     Chronic ongoing problem, continues to find the Adderall 50 mg twice daily is helpful for focus and concentration.  Overall her mood has taken a decline due to a multitude of " "reasons.  She bit more apathetic than her usual and is having more trouble finding purpose whereas before she was very busy with volunteering.  Mild issues with insomnia but does not relate this to Adderall, weight remains stable.  Will continue at this time with low threshold to make dose adjustments in the future if the insomnia and mood do not improve.    Obtained and reviewed patient utilization report from [facility] [place] pharmacy database on 4/9/2024 10:44 AM  prior to writing prescription for controlled substance II, III or IV per [place] bill . Based on assessment of the report, the prescription is medically necessary.     Patient understands this prescription is a controlled substance which is potentially habit-forming and its use is regulated by the NEIL. We also discussed the new \"black box\" warning regarding the lethal combination of opioids and benzodiazepines. Refills are subject to terms of a controlled substance agreement and patient has an updated one on file. Most recent UDS is appropriate. Any refill requires an office visit. Narcotics have may adverse effects and the risks of addiction, accidental overdose and death were emphasized. Provided prescriptions for the next three months.           Relevant Medications    amphetamine-dextroamphetamine (ADDERALL XR) 15 MG XR capsule (Start on 5/14/2024)    amphetamine-dextroamphetamine (ADDERALL XR) 15 MG XR capsule (Start on 6/13/2024)    Other Relevant Orders    Controlled Substance Treatment Agreement    URINE DRUG SCREEN    Essential tremor     Chronic and worsening problem, she feels like essential tremor is worsening and not responding as well to the propranolol.  Advised her to try a dose increase up to 30 mg twice daily and see how she feels.  If no better then could consider going off propranolol and trying something different such as primidone.  Advised her to reach out to me in the interim if she has any side effects to the higher dose of " propranolol.         Relevant Medications    propranolol (INDERAL) 10 MG Tab    Primary hypertension     Chronic and stable problem, blood pressure has been doing better for the past 6 months in the normal range, we are increasing propranolol to help with tremor and will keep an eye on heart rate and blood pressure to make sure she is not overtreated.  Her mirabegron can cause increases in heart rate and blood pressure as well as the Adderall.         Relevant Medications    propranolol (INDERAL) 10 MG Tab    Urgency incontinence     Chronic ongoing problem, continues to be successful with mirabegron 25 mg daily, cost can be a nuisance but otherwise does not know any side effects.          Other Visit Diagnoses       Encounter for subsequent annual wellness visit (AWV) in Medicare patient    -  Primary              Services suggested: No services needed at this time  Health Care Screening: Age-appropriate preventive services recommended by USPTF and ACIP covered by Medicare were discussed today. Services ordered if indicated and agreed upon by the patient.  Referrals offered: Community-based lifestyle interventions to reduce health risks and promote self-management and wellness, fall prevention, nutrition, physical activity, tobacco-use cessation, weight loss, and mental health services as per orders if indicated.    Discussion today about general wellness and lifestyle habits:    Prevent falls and reduce trip hazards; Cautioned about securing or removing rugs.  Have a working fire alarm and carbon monoxide detector;   Engage in regular physical activity and social activities     Follow-up: Return in about 3 months (around 7/9/2024).    I spent a total of 34 minutes with record review, exam, communication with the patient, communication with other providers, and documentation of this encounter.    Farhana Quintanilla, DO  Geriatric and Internal Medicine  [facility]

## 2024-04-09 NOTE — ASSESSMENT & PLAN NOTE
Chronic ongoing problem, continues to be successful with mirabegron 25 mg daily, cost can be a nuisance but otherwise does not know any side effects.

## 2024-04-09 NOTE — ASSESSMENT & PLAN NOTE
"Chronic ongoing problem, continues to find the Adderall 50 mg twice daily is helpful for focus and concentration.  Overall her mood has taken a decline due to a multitude of reasons.  She bit more apathetic than her usual and is having more trouble finding purpose whereas before she was very busy with volunteering.  Mild issues with insomnia but does not relate this to Adderall, weight remains stable.  Will continue at this time with low threshold to make dose adjustments in the future if the insomnia and mood do not improve.    Obtained and reviewed patient utilization report from [facility] pharmacy database on 4/9/2024 10:44 AM  prior to writing prescription for controlled substance II, III or IV per [place] bill . Based on assessment of the report, the prescription is medically necessary.     Patient understands this prescription is a controlled substance which is potentially habit-forming and its use is regulated by the NEIL. We also discussed the new \"black box\" warning regarding the lethal combination of opioids and benzodiazepines. Refills are subject to terms of a controlled substance agreement and patient has an updated one on file. Most recent UDS is appropriate. Any refill requires an office visit. Narcotics have may adverse effects and the risks of addiction, accidental overdose and death were emphasized. Provided prescriptions for the next three months.    "

## 2024-04-17 DIAGNOSIS — F90.0 ATTENTION DEFICIT HYPERACTIVITY DISORDER (ADHD), PREDOMINANTLY INATTENTIVE TYPE: ICD-10-CM

## 2024-04-18 RX ORDER — DEXTROAMPHETAMINE SACCHARATE, AMPHETAMINE ASPARTATE, DEXTROAMPHETAMINE SULFATE AND AMPHETAMINE SULFATE 3.75; 3.75; 3.75; 3.75 MG/1; MG/1; MG/1; MG/1
15 TABLET ORAL 2 TIMES DAILY
Qty: 60 TABLET | Refills: 0 | OUTPATIENT
Start: 2024-04-18 | End: 2024-05-18

## 2024-04-22 PROCEDURE — RXMED WILLOW AMBULATORY MEDICATION CHARGE: Performed by: INTERNAL MEDICINE

## 2024-04-29 ENCOUNTER — PHARMACY VISIT (OUTPATIENT)
Dept: PHARMACY | Facility: MEDICAL CENTER | Age: 79
End: 2024-04-29
Payer: COMMERCIAL

## 2024-05-02 ENCOUNTER — HOSPITAL ENCOUNTER (EMERGENCY)
Facility: MEDICAL CENTER | Age: 79
End: 2024-05-02
Attending: EMERGENCY MEDICINE
Payer: MEDICARE

## 2024-05-02 ENCOUNTER — APPOINTMENT (OUTPATIENT)
Dept: RADIOLOGY | Facility: MEDICAL CENTER | Age: 79
End: 2024-05-02
Attending: EMERGENCY MEDICINE
Payer: MEDICARE

## 2024-05-02 VITALS
SYSTOLIC BLOOD PRESSURE: 147 MMHG | RESPIRATION RATE: 14 BRPM | TEMPERATURE: 97.8 F | DIASTOLIC BLOOD PRESSURE: 74 MMHG | HEIGHT: 62 IN | OXYGEN SATURATION: 95 % | WEIGHT: 149 LBS | HEART RATE: 61 BPM | BODY MASS INDEX: 27.42 KG/M2

## 2024-05-02 DIAGNOSIS — S09.8XXA BLUNT HEAD TRAUMA, INITIAL ENCOUNTER: ICD-10-CM
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
## 2024-05-02 ASSESSMENT — PAIN DESCRIPTION - PAIN TYPE: TYPE: ACUTE PAIN

## 2024-05-02 ASSESSMENT — FIBROSIS 4 INDEX: FIB4 SCORE: 1.74

## 2024-05-02 NOTE — ED TRIAGE NOTES
Chief Complaint   Patient presents with    Head Laceration    Fall     Patient brought in with . Patient made a TBI on arrival to ED lobby.   Patient was walking and tripped on her feet and hit her head on a concert patio. Patient denies ALOC. -blood thinners. Patient states she denies any dizziness or syncopal episode before falling.   On arrival patient is A+Ox4. VSS.   Patient has 1 inch laceration to left eyebrow.   All questions answered.

## 2024-05-02 NOTE — ED PROVIDER NOTES
ER Provider Note    Scribed for Ricardo Massey M.D. by Negar Ordoñez. 5/2/2024   9:31 AM    Primary Care Provider: Farhana Quintanilla D.O.    CHIEF COMPLAINT  Chief Complaint   Patient presents with    Head Laceration    Fall     EXTERNAL RECORDS REVIEWED  The patient was last seen on 4/9/24 for an annual wellness visit.     HPI/ROS  Cherelle Crystal is a 78 y.o. female who presents to the ED as a code TBI following a mechanical ground level fall prior to arrival. Per patient, she tripped over something on her patio and fell, striking the left side of her head and sustaining a laceration. She denies any neck or extremity pain. She denies taking any blood thinners.     PAST MEDICAL HISTORY  Past Medical History:   Diagnosis Date    Hyperlipidemia     Iron deficiency anemia 11/14/2022     Latest Reference Range & Units 08/29/22 11:15 Hemoglobin 12.0 - 16.0 g/dL 10.3 (L) Hematocrit 37.0 - 47.0 % 31.3 (L) MCV 81.4 - 97.8 fL 97.2   She had GI bleed noted in Aug 2022. She developed melena and hematochezia at the same time. She was hospitalized with upper endoscopy showing gastritis (no active bleed). Colonoscopy demonstrated 1 small polyp, adenoma. She was placed on PPI and advised to    Leg paresthesia 6/14/2021    She reports abnormal sensations in her bilateral lower extremities. Starts int he feet and goes up to the knees with some additional abnormalities in the bilateral upper extremities. No recent lab work. No preceding injury. Denies known side effects from from medications.       SURGICAL HISTORY  Past Surgical History:   Procedure Laterality Date    MT COLONOSCOPY,DIAGNOSTIC N/A 8/30/2022    Procedure: COLONOSCOPY;  Surgeon: Rich Agee M.D.;  Location: [facility];  Service: Gastroenterology    MT UPPER GI ENDOSCOPY,DIAGNOSIS N/A 8/29/2022    Procedure: GASTROSCOPY;  Surgeon: Rich Agee M.D.;  Location: [facility];  Service: Gastroenterology       FAMILY HISTORY  Family  "History   Problem Relation Age of Onset    Allergies Mother     Cancer Father         prostate    Diabetes Sister     Heart Disease Sister     Stroke Brother     Stroke Paternal Grandmother     Cancer Paternal Grandfather         throat    No Known Problems Sister     Hyperlipidemia Son     Heart Disease Daughter     Hyperlipidemia Daughter        SOCIAL HISTORY   reports that she has never smoked. She has never used smokeless tobacco. She reports that she does not currently use alcohol. She reports that she does not use drugs.    CURRENT MEDICATIONS  Previous Medications    ALBUTEROL 108 (90 BASE) MCG/ACT AERO SOLN INHALATION AEROSOL    Inhale 1-2 Puffs every 6 hours as needed for Shortness of Breath.    AMPHETAMINE-DEXTROAMPHETAMINE (ADDERALL XR) 15 MG XR CAPSULE    Take 1 Capsule by mouth 2 times a day for 30 days.    AMPHETAMINE-DEXTROAMPHETAMINE (ADDERALL XR) 15 MG XR CAPSULE    Take 1 Capsule by mouth 2 times a day for 30 days.    AMPHETAMINE-DEXTROAMPHETAMINE (ADDERALL) 15 MG TABLET    Take 1 tablet by mouth 2 times a day for 30 days.    MIRABEGRON ER (MYRBETRIQ) 25 MG TABLET SR 24 HR    Take 1 tablet by mouth every evening.    PANTOPRAZOLE (PROTONIX) 20 MG TABLET    TAKE 1 TABLET BY MOUTH EVERY DAY    PROPRANOLOL (INDERAL) 10 MG TAB    Take 3 Tablets by mouth 2 times a day.       ALLERGIES  Allergies   Allergen Reactions    Penicillin G Rash     Rash all over    Lavender Oil Rash              PHYSICAL EXAM  BP (!) 147/74   Pulse 63   Temp 36.6 °C (97.8 °F) (Tympanic)   Resp 16   Ht 1.575 m (5' 2\")   Wt 67.6 kg (149 lb)   SpO2 95%   BMI 27.25 kg/m²    Nursing note and vitals reviewed.  Constitutional: Well-developed and well-nourished.  Mild distress.   HENT: Head is normocephalic. Superficial 1 cm laceration to the left forehead. Oropharynx is clear and moist without exudate or erythema.   Eyes: Pupils are equal, round, and reactive to light. Conjunctiva are normal.   Cardiovascular: Normal rate and " regular rhythm. No murmur heard. Normal radial pulses.  Pulmonary/Chest: Breath sounds normal. No wheezes or rales.   Abdominal: Soft and non-tender. No distention    Musculoskeletal: Extremities exhibit normal range of motion without edema or tenderness.   Neurological: Awake, alert and oriented to person, place, and time. No focal deficits noted.  Skin: Skin is warm and dry. No rash.   Psychiatric: Normal mood and affect. Appropriate for clinical situation    DIAGNOSTIC STUDIES    Radiology:   This attending emergency physician has independently interpreted the diagnostic imaging associated with this visit and is awaiting the final reading from the radiologist.   Preliminary interpretation is a follows: no intracranial hemorrhage    Radiologist interpretation:   CT-HEAD W/O   Final Result      1.  Cerebral atrophy.      2.  Otherwise, Head CT without contrast within normal limits. No evidence of acute cerebral infarction, hemorrhage or mass lesion.              INITIAL ASSESSMENT AND PLAN    9:31 AM - Patient was evaluated at the charge desk as a code TBI. Ordered for CT-Head without to evaluate.     9:59 AM - The patient was seen at bedside. She has a superficial 1 cm laceration to the left forehead that does not require repair. I discussed this with the patient and her  and updated her on negative Ct results. She was advised of all return precautions. Patient verbalizes understanding and support with my plan of care.         DISPOSITION AND DISCUSSIONS    Escalation of care considered, and ultimately not performed: acute inpatient care management, however at this time, the patient is most appropriate for outpatient management.    Decision tools and prescription drugs considered including, but not limited to: Medication modification: I do not see any indication for medication modification.    The patient will return for new or worsening symptoms and is stable at the time of  discharge.    DISPOSITION:  Patient will be discharged home in stable condition.    FOLLOW UP:  Farhana Quintanilla D.O.  [address]  [address]  [facility] [id number] [phone]    Schedule an appointment as soon as possible for a visit       [facility], Emergency Dept  [address] [facility] [id number] [phone]    If symptoms worsen    FINAL DIAGNOSIS  1. Fall, initial encounter    2. Blunt head trauma, initial encounter         I, Negar Ordoñez (Scribe), am scribing for, and in the presence of, Ricardo Massey M.D..    Electronically signed by: Negar Ordoñez (Dora), 5/2/2024    IRicardo M.D. personally performed the services described in this documentation, as scribed by Negar Ordoñez in my presence, and it is both accurate and complete.      The note accurately reflects work and decisions made by me.  Ricardo Massey M.D.  5/2/2024  2:34 PM

## 2024-05-02 NOTE — ED NOTES
Discharge paperwork given to pt, all questions answered, all belongings accounted for, pt ambulated with steady gait to the Er exit

## 2024-05-13 ENCOUNTER — TELEPHONE (OUTPATIENT)
Dept: MEDICAL GROUP | Facility: PHYSICIAN GROUP | Age: 79
End: 2024-05-13
Payer: MEDICARE

## 2024-05-13 ENCOUNTER — APPOINTMENT (OUTPATIENT)
Dept: MEDICAL GROUP | Facility: PHYSICIAN GROUP | Age: 79
End: 2024-05-13
Payer: MEDICARE

## 2024-05-13 ENCOUNTER — OFFICE VISIT (OUTPATIENT)
Dept: MEDICAL GROUP | Facility: PHYSICIAN GROUP | Age: 79
End: 2024-05-13
Payer: MEDICARE

## 2024-05-13 VITALS
SYSTOLIC BLOOD PRESSURE: 124 MMHG | BODY MASS INDEX: 27.95 KG/M2 | DIASTOLIC BLOOD PRESSURE: 62 MMHG | TEMPERATURE: 99.1 F | HEIGHT: 62 IN | WEIGHT: 151.9 LBS | HEART RATE: 78 BPM | OXYGEN SATURATION: 95 %
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
## 2024-05-13 DIAGNOSIS — K21.9 GASTROESOPHAGEAL REFLUX DISEASE WITHOUT ESOPHAGITIS: ICD-10-CM

## 2024-05-13 DIAGNOSIS — G31.9 CEREBRAL ATROPHY (HCC): ICD-10-CM

## 2024-05-13 DIAGNOSIS — S00.91XD ABRASION OF HEAD, SUBSEQUENT ENCOUNTER: ICD-10-CM

## 2024-05-13 PROBLEM — S00.91XA ABRASION OF HEAD: Status: ACTIVE | Noted: 2024-05-13
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
## 2024-05-13 PROCEDURE — RXMED WILLOW AMBULATORY MEDICATION CHARGE: Performed by: INTERNAL MEDICINE

## 2024-05-13 PROCEDURE — 99214 OFFICE O/P EST MOD 30 MIN: CPT | Performed by: INTERNAL MEDICINE

## 2024-05-13 PROCEDURE — 3074F SYST BP LT 130 MM HG: CPT | Performed by: INTERNAL MEDICINE

## 2024-05-13 PROCEDURE — 3078F DIAST BP <80 MM HG: CPT | Performed by: INTERNAL MEDICINE

## 2024-05-13 RX ORDER — PANTOPRAZOLE SODIUM 20 MG/1
20 TABLET, DELAYED RELEASE ORAL DAILY
Qty: 100 TABLET | Refills: 3 | Status: SHIPPED | OUTPATIENT
Start: 2024-05-13

## 2024-05-13 ASSESSMENT — FIBROSIS 4 INDEX: FIB4 SCORE: 1.74

## 2024-05-13 NOTE — TELEPHONE ENCOUNTER
Let me know if you can get ahold of her or Ed or if we still need to fill the 3pm spot later if she doesn't answer or get back to you

## 2024-05-13 NOTE — TELEPHONE ENCOUNTER
1. Caller Name: Cherelle Crystal                          Call Back Number: [phone] (home)         How would the patient prefer to be contacted with a response: Phone call OK to leave a detailed message    Called and left a message for patient to come in at 3:00pm today.  Please call back

## 2024-05-14 NOTE — ASSESSMENT & PLAN NOTE
New problem incidentally seen on head imaging after she had a fall hitting her head on the cement.  Reviewed head CT which was otherwise nonacute, unlikely to be clinically significant.

## 2024-05-14 NOTE — ASSESSMENT & PLAN NOTE
Chronic ongoing problem, she had worsening of her heartburn after going off PPI therapy and due to history of previous antral gastritis is agreeable to going back on a continuing at this time since she became symptomatic.  Will renew pantoprazole 20 mg daily.

## 2024-05-14 NOTE — ASSESSMENT & PLAN NOTE
New abrasion related to a fall causing trauma on the cement, healing as expected, no signs of infection at the abrasion and black eyes resolving, will be if she is wearing make-up today so it may be a little bit darker than appreciated on exam.

## 2024-05-14 NOTE — PROGRESS NOTES
Subjective:   Chief Complaint/History of Present Illness:  Cherelle Crystal is a 78 y.o. female established patient who presents today to discuss medical problems as listed below. Cherelle is unaccompanied for today's visit.    Problem   Gastroesophageal Reflux Disease Without Esophagitis    Erosive antral gastritis seen on EGD in Aug 2022 at time of hospitalization for melena. Discharged on pantoprazole which she stopped but this led to worsening of GERD symptoms so she went back on in mid May 2024.    Current regimen: protonix 20 mg daily     Fall    She had a fall on her patio in early May 2024.  She was carrying a pillow and thinks she tripped over a cord causing her to fall forward and hit her head on cement.  She denies loss of consciousness.  She had an abrasion above the left eyebrow and went to the ER for evaluation.  CT imaging was nonacute and she was discharged home with supportive care.     Abrasion of Head    She had a fall in May 2024 where she hit her head on cement after tripping over a cord.  No LOC.  Seen in the ER with supportive treatment and reassuring CT imaging.  Developed black as subsequently but notes everything is healing as expected.     Cerebral Atrophy (Hcc)    Had CT imaging after she sustained a fall and hit her head on cement.  Incidental finding of mild cerebral atrophy, discussed this is most likely age-related and does not have any significant ventriculomegaly or disproportionate loss of tissue.          Current Medications:  Current Outpatient Medications Ordered in Epic   Medication Sig Dispense Refill    pantoprazole (PROTONIX) 20 MG tablet Take 1 Tablet by mouth every day. 100 Tablet 3    propranolol (INDERAL) 10 MG Tab Take 3 Tablets by mouth 2 times a day. 600 Tablet 3    [START ON 5/14/2024] amphetamine-dextroamphetamine (ADDERALL XR) 15 MG XR capsule Take 1 Capsule by mouth 2 times a day for 30 days. 60 Capsule 0    [START ON 6/13/2024] amphetamine-dextroamphetamine (ADDERALL  "XR) 15 MG XR capsule Take 1 Capsule by mouth 2 times a day for 30 days. 60 Capsule 0    amphetamine-dextroamphetamine (ADDERALL) 15 MG tablet Take 1 tablet by mouth 2 times a day for 30 days. 60 Tablet 0    mirabegron ER (MYRBETRIQ) 25 MG TABLET SR 24 HR Take 1 tablet by mouth every evening. 100 Tablet 3    albuterol 108 (90 Base) MCG/ACT Aero Soln inhalation aerosol Inhale 1-2 Puffs every 6 hours as needed for Shortness of Breath.       No current [facility]-ordered facility-administered medications on file.          Objective:   Physical Exam:    Vitals: /62 (BP Location: Right arm, Patient Position: Sitting, BP Cuff Size: Adult)   Pulse 78   Temp 37.3 °C (99.1 °F) (Temporal)   Ht 1.575 m (5' 2\")   Wt 68.9 kg (151 lb 14.4 oz)   SpO2 95%    BMI: Body mass index is 27.78 kg/m².  Physical Exam  Constitutional:       General: She is not in acute distress.     Appearance: Normal appearance. She is not ill-appearing.   HENT:      Right Ear: External ear normal.      Left Ear: External ear normal.   Eyes:      General: No scleral icterus.     Conjunctiva/sclera: Conjunctivae normal.      Comments: Healing ecchymoses and mild edema around left orbit   Cardiovascular:      Rate and Rhythm: Normal rate and regular rhythm.      Pulses: Normal pulses.   Pulmonary:      Effort: Pulmonary effort is normal. No respiratory distress.      Breath sounds: No wheezing.   Skin:     General: Skin is warm and dry.      Findings: Bruising present.      Comments: Healing abrasion above left eyebrow   Psychiatric:         Mood and Affect: Mood normal.         Behavior: Behavior normal.         Thought Content: Thought content normal.         Judgment: Judgment normal.         Assessment and Plan:   Cherelle is a 78 y.o. female with the following:  Problem List Items Addressed This Visit       Abrasion of head     New abrasion related to a fall causing trauma on the cement, healing as expected, no signs of infection at the abrasion and " black eyes resolving, will be if she is wearing make-up today so it may be a little bit darker than appreciated on exam.         Cerebral atrophy (HCC)     New problem incidentally seen on head imaging after she had a fall hitting her head on the cement.  Reviewed head CT which was otherwise nonacute, unlikely to be clinically significant.         Fall     Sustained a fall which she believes occurred after tripping over a cord on her patio causing her to hit her head on the cement.  Prior to that cannot remember the last time she sustained a fall.  Did not have any prodrome to suggest medication as an underlying cause.         Gastroesophageal reflux disease without esophagitis     Chronic ongoing problem, she had worsening of her heartburn after going off PPI therapy and due to history of previous antral gastritis is agreeable to going back on a continuing at this time since she became symptomatic.  Will renew pantoprazole 20 mg daily.         Relevant Medications    pantoprazole (PROTONIX) 20 MG tablet          RTC: Return if symptoms worsen or fail to improve.    I spent a total of 30 minutes with record review, exam, communication with the patient, communication with other providers, and documentation of this encounter.    PLEASE NOTE: This dictation was created using voice recognition software. I have made every reasonable attempt to correct obvious errors, but I expect that there are errors of grammar and possibly content that I did not discover before finalizing the note.      Farhana Quintanilla, DO  Geriatric and Internal Medicine  [facility]

## 2024-05-14 NOTE — ASSESSMENT & PLAN NOTE
Sustained a fall which she believes occurred after tripping over a cord on her patio causing her to hit her head on the cement.  Prior to that cannot remember the last time she sustained a fall.  Did not have any prodrome to suggest medication as an underlying cause.

## 2024-05-20 ENCOUNTER — APPOINTMENT (OUTPATIENT)
Dept: MEDICAL GROUP | Facility: PHYSICIAN GROUP | Age: 79
End: 2024-05-20
Payer: MEDICARE

## 2024-05-21 ENCOUNTER — PHARMACY VISIT (OUTPATIENT)
Dept: PHARMACY | Facility: MEDICAL CENTER | Age: 79
End: 2024-05-21
Payer: COMMERCIAL

## 2024-06-05 DIAGNOSIS — F90.0 ATTENTION DEFICIT HYPERACTIVITY DISORDER (ADHD), PREDOMINANTLY INATTENTIVE TYPE: ICD-10-CM

## 2024-06-05 DIAGNOSIS — G25.0 ESSENTIAL TREMOR: ICD-10-CM

## 2024-06-05 RX ORDER — PROPRANOLOL HYDROCHLORIDE 10 MG/1
30 TABLET ORAL 2 TIMES DAILY
Qty: 600 TABLET | Refills: 3 | Status: SHIPPED | OUTPATIENT
Start: 2024-06-05

## 2024-06-05 NOTE — TELEPHONE ENCOUNTER
Requested Prescriptions     Pending Prescriptions Disp Refills    propranolol (INDERAL) 10 MG Tab 600 Tablet 3     Sig: Take 3 Tablets by mouth 2 times a day.     Gardenia Catherine M.D.

## 2024-06-06 PROCEDURE — RXMED WILLOW AMBULATORY MEDICATION CHARGE: Performed by: INTERNAL MEDICINE

## 2024-06-06 PROCEDURE — RXMED WILLOW AMBULATORY MEDICATION CHARGE: Performed by: FAMILY MEDICINE

## 2024-06-06 RX ORDER — DEXTROAMPHETAMINE SACCHARATE, AMPHETAMINE ASPARTATE, DEXTROAMPHETAMINE SULFATE AND AMPHETAMINE SULFATE 3.75; 3.75; 3.75; 3.75 MG/1; MG/1; MG/1; MG/1
15 TABLET ORAL 2 TIMES DAILY
Qty: 60 TABLET | Refills: 0 | Status: SHIPPED | OUTPATIENT
Start: 2024-06-06 | End: 2024-07-07

## 2024-06-07 ENCOUNTER — PHARMACY VISIT (OUTPATIENT)
Dept: PHARMACY | Facility: MEDICAL CENTER | Age: 79
End: 2024-06-07
Payer: COMMERCIAL

## 2024-06-20 PROCEDURE — RXMED WILLOW AMBULATORY MEDICATION CHARGE: Performed by: INTERNAL MEDICINE

## 2024-06-21 ENCOUNTER — PHARMACY VISIT (OUTPATIENT)
Dept: PHARMACY | Facility: MEDICAL CENTER | Age: 79
End: 2024-06-21
Payer: COMMERCIAL

## 2024-07-09 ENCOUNTER — APPOINTMENT (OUTPATIENT)
Dept: MEDICAL GROUP | Facility: PHYSICIAN GROUP | Age: 79
End: 2024-07-09
Payer: MEDICARE

## 2024-07-09 VITALS
TEMPERATURE: 97.8 F | OXYGEN SATURATION: 96 % | BODY MASS INDEX: 27.22 KG/M2 | HEART RATE: 65 BPM | DIASTOLIC BLOOD PRESSURE: 64 MMHG | HEIGHT: 62 IN | WEIGHT: 147.9 LBS | SYSTOLIC BLOOD PRESSURE: 124 MMHG

## 2024-07-09 DIAGNOSIS — I10 PRIMARY HYPERTENSION: ICD-10-CM

## 2024-07-09 DIAGNOSIS — G25.0 ESSENTIAL TREMOR: ICD-10-CM

## 2024-07-09 DIAGNOSIS — N39.41 URGENCY INCONTINENCE: ICD-10-CM

## 2024-07-09 DIAGNOSIS — F90.0 ATTENTION DEFICIT HYPERACTIVITY DISORDER (ADHD), PREDOMINANTLY INATTENTIVE TYPE: ICD-10-CM

## 2024-07-09 DIAGNOSIS — S00.91XD ABRASION OF HEAD, SUBSEQUENT ENCOUNTER: ICD-10-CM

## 2024-07-09 PROCEDURE — 99214 OFFICE O/P EST MOD 30 MIN: CPT | Performed by: INTERNAL MEDICINE

## 2024-07-09 PROCEDURE — 3074F SYST BP LT 130 MM HG: CPT | Performed by: INTERNAL MEDICINE

## 2024-07-09 PROCEDURE — 3078F DIAST BP <80 MM HG: CPT | Performed by: INTERNAL MEDICINE

## 2024-07-09 RX ORDER — DEXTROAMPHETAMINE SACCHARATE, AMPHETAMINE ASPARTATE, DEXTROAMPHETAMINE SULFATE AND AMPHETAMINE SULFATE 3.75; 3.75; 3.75; 3.75 MG/1; MG/1; MG/1; MG/1
15 TABLET ORAL 2 TIMES DAILY
Qty: 60 TABLET | Refills: 0 | Status: SHIPPED | OUTPATIENT
Start: 2024-08-19 | End: 2024-09-18

## 2024-07-09 RX ORDER — MIRABEGRON 25 MG/1
25 TABLET, FILM COATED, EXTENDED RELEASE ORAL EVERY EVENING
Qty: 100 TABLET | Refills: 3 | Status: SHIPPED | OUTPATIENT
Start: 2024-07-09

## 2024-07-09 RX ORDER — DEXTROAMPHETAMINE SACCHARATE, AMPHETAMINE ASPARTATE, DEXTROAMPHETAMINE SULFATE AND AMPHETAMINE SULFATE 3.75; 3.75; 3.75; 3.75 MG/1; MG/1; MG/1; MG/1
15 TABLET ORAL 2 TIMES DAILY
Qty: 60 TABLET | Refills: 0 | Status: SHIPPED | OUTPATIENT
Start: 2024-09-18 | End: 2024-10-18

## 2024-07-09 RX ORDER — DEXTROAMPHETAMINE SACCHARATE, AMPHETAMINE ASPARTATE, DEXTROAMPHETAMINE SULFATE AND AMPHETAMINE SULFATE 3.75; 3.75; 3.75; 3.75 MG/1; MG/1; MG/1; MG/1
15 TABLET ORAL 2 TIMES DAILY
Qty: 60 TABLET | Refills: 0 | Status: SHIPPED | OUTPATIENT
Start: 2024-07-20 | End: 2024-08-19

## 2024-07-09 ASSESSMENT — FIBROSIS 4 INDEX: FIB4 SCORE: 1.74

## 2024-07-30 ENCOUNTER — OFFICE VISIT (OUTPATIENT)
Dept: MEDICAL GROUP | Facility: PHYSICIAN GROUP | Age: 79
End: 2024-07-30
Payer: MEDICARE

## 2024-07-30 VITALS
WEIGHT: 147.2 LBS | OXYGEN SATURATION: 96 % | SYSTOLIC BLOOD PRESSURE: 118 MMHG | TEMPERATURE: 97.7 F | DIASTOLIC BLOOD PRESSURE: 70 MMHG | RESPIRATION RATE: 12 BRPM | HEIGHT: 62 IN | HEART RATE: 66 BPM | BODY MASS INDEX: 27.09 KG/M2

## 2024-07-30 DIAGNOSIS — L30.9 DERMATITIS: ICD-10-CM

## 2024-07-30 RX ORDER — CETIRIZINE HYDROCHLORIDE 10 MG/1
10 TABLET ORAL 2 TIMES DAILY
COMMUNITY

## 2024-07-30 RX ORDER — PREDNISONE 20 MG/1
TABLET ORAL
Qty: 15 TABLET | Refills: 0 | Status: SHIPPED | OUTPATIENT
Start: 2024-07-30 | End: 2024-08-11

## 2024-07-30 RX ORDER — TRIAMCINOLONE ACETONIDE 1 MG/G
1 CREAM TOPICAL 2 TIMES DAILY
Qty: 80 G | Refills: 2 | Status: SHIPPED | OUTPATIENT
Start: 2024-07-30

## 2024-07-30 ASSESSMENT — FIBROSIS 4 INDEX: FIB4 SCORE: 1.74

## 2024-08-01 ENCOUNTER — TELEPHONE (OUTPATIENT)
Dept: MEDICAL GROUP | Facility: PHYSICIAN GROUP | Age: 79
End: 2024-08-01
Payer: MEDICARE

## 2024-08-01 NOTE — TELEPHONE ENCOUNTER
1. Caller Name: Cherelle Crystal                          Call Back Number: [phone] (home)        How would the patient prefer to be contacted with a response: Phone call OK to leave a detailed message    Itching almost gone  Black and Blue now under rash      Not spreading any more    Question about does on prednisone  when taking 2 tabs does she take both tabs at same time  --yes

## 2024-08-01 NOTE — TELEPHONE ENCOUNTER
----- Message from Hanny Henry, Med Ass't sent at 7/30/2024  5:45 PM PDT -----  Regarding: Call  treatment helping   Is she better  ?

## 2024-08-22 PROCEDURE — RXMED WILLOW AMBULATORY MEDICATION CHARGE: Performed by: INTERNAL MEDICINE

## 2024-08-27 ENCOUNTER — PHARMACY VISIT (OUTPATIENT)
Dept: PHARMACY | Facility: MEDICAL CENTER | Age: 79
End: 2024-08-27
Payer: COMMERCIAL

## 2024-09-03 PROCEDURE — RXMED WILLOW AMBULATORY MEDICATION CHARGE: Performed by: INTERNAL MEDICINE

## 2024-09-12 ENCOUNTER — PHARMACY VISIT (OUTPATIENT)
Dept: PHARMACY | Facility: MEDICAL CENTER | Age: 79
End: 2024-09-12
Payer: COMMERCIAL

## 2024-10-07 ENCOUNTER — PHARMACY VISIT (OUTPATIENT)
Dept: PHARMACY | Facility: MEDICAL CENTER | Age: 79
End: 2024-10-07
Payer: COMMERCIAL

## 2024-10-07 PROCEDURE — RXMED WILLOW AMBULATORY MEDICATION CHARGE: Performed by: FAMILY MEDICINE

## 2024-10-22 ENCOUNTER — OFFICE VISIT (OUTPATIENT)
Dept: MEDICAL GROUP | Facility: PHYSICIAN GROUP | Age: 79
End: 2024-10-22
Payer: MEDICARE

## 2024-10-22 VITALS
RESPIRATION RATE: 16 BRPM | OXYGEN SATURATION: 97 % | HEIGHT: 62 IN | DIASTOLIC BLOOD PRESSURE: 68 MMHG | SYSTOLIC BLOOD PRESSURE: 128 MMHG | HEART RATE: 64 BPM | BODY MASS INDEX: 26.87 KG/M2 | TEMPERATURE: 97.7 F | WEIGHT: 146 LBS

## 2024-10-22 DIAGNOSIS — H91.93 BILATERAL HEARING LOSS, UNSPECIFIED HEARING LOSS TYPE: ICD-10-CM

## 2024-10-22 DIAGNOSIS — G25.0 ESSENTIAL TREMOR: ICD-10-CM

## 2024-10-22 DIAGNOSIS — F90.0 ATTENTION DEFICIT HYPERACTIVITY DISORDER (ADHD), PREDOMINANTLY INATTENTIVE TYPE: ICD-10-CM

## 2024-10-22 DIAGNOSIS — I10 PRIMARY HYPERTENSION: ICD-10-CM

## 2024-10-22 DIAGNOSIS — R73.01 ELEVATED FASTING BLOOD SUGAR: ICD-10-CM

## 2024-10-22 DIAGNOSIS — E55.9 VITAMIN D DEFICIENCY: ICD-10-CM

## 2024-10-22 DIAGNOSIS — K21.9 GASTROESOPHAGEAL REFLUX DISEASE WITHOUT ESOPHAGITIS: ICD-10-CM

## 2024-10-22 DIAGNOSIS — E78.2 MIXED HYPERLIPIDEMIA: ICD-10-CM

## 2024-10-22 DIAGNOSIS — D50.9 IRON DEFICIENCY ANEMIA, UNSPECIFIED IRON DEFICIENCY ANEMIA TYPE: ICD-10-CM

## 2024-10-22 PROCEDURE — 99214 OFFICE O/P EST MOD 30 MIN: CPT | Performed by: INTERNAL MEDICINE

## 2024-10-22 PROCEDURE — G2211 COMPLEX E/M VISIT ADD ON: HCPCS | Performed by: INTERNAL MEDICINE

## 2024-10-22 PROCEDURE — 3074F SYST BP LT 130 MM HG: CPT | Performed by: INTERNAL MEDICINE

## 2024-10-22 PROCEDURE — 3078F DIAST BP <80 MM HG: CPT | Performed by: INTERNAL MEDICINE

## 2024-10-22 RX ORDER — DEXTROAMPHETAMINE SACCHARATE, AMPHETAMINE ASPARTATE, DEXTROAMPHETAMINE SULFATE AND AMPHETAMINE SULFATE 3.75; 3.75; 3.75; 3.75 MG/1; MG/1; MG/1; MG/1
15 TABLET ORAL 2 TIMES DAILY
Qty: 60 TABLET | Refills: 0 | Status: SHIPPED | OUTPATIENT
Start: 2024-10-22 | End: 2024-11-21

## 2024-10-22 RX ORDER — PROPRANOLOL HYDROCHLORIDE 10 MG/1
40 TABLET ORAL 2 TIMES DAILY
Qty: 800 TABLET | Refills: 3
Start: 2024-10-22

## 2024-10-22 ASSESSMENT — FIBROSIS 4 INDEX: FIB4 SCORE: 1.76

## 2024-11-08 PROCEDURE — RXMED WILLOW AMBULATORY MEDICATION CHARGE: Performed by: INTERNAL MEDICINE

## 2024-11-12 ENCOUNTER — PHARMACY VISIT (OUTPATIENT)
Dept: PHARMACY | Facility: MEDICAL CENTER | Age: 79
End: 2024-11-12
Payer: COMMERCIAL

## 2025-01-02 ENCOUNTER — PHARMACY VISIT (OUTPATIENT)
Dept: PHARMACY | Facility: MEDICAL CENTER | Age: 80
End: 2025-01-02
Payer: COMMERCIAL

## 2025-01-02 PROCEDURE — RXMED WILLOW AMBULATORY MEDICATION CHARGE: Performed by: INTERNAL MEDICINE

## 2025-01-16 ENCOUNTER — HOSPITAL ENCOUNTER (OUTPATIENT)
Dept: LAB | Facility: MEDICAL CENTER | Age: 80
End: 2025-01-16
Attending: INTERNAL MEDICINE
Payer: MEDICARE

## 2025-01-16 DIAGNOSIS — E78.2 MIXED HYPERLIPIDEMIA: ICD-10-CM

## 2025-01-16 DIAGNOSIS — F90.0 ATTENTION DEFICIT HYPERACTIVITY DISORDER (ADHD), PREDOMINANTLY INATTENTIVE TYPE: ICD-10-CM

## 2025-01-16 DIAGNOSIS — E55.9 VITAMIN D DEFICIENCY: ICD-10-CM

## 2025-01-16 DIAGNOSIS — R73.01 ELEVATED FASTING BLOOD SUGAR: ICD-10-CM

## 2025-01-16 LAB
BASOPHILS # BLD AUTO: 0.8 % (ref 0–1.8)
BASOPHILS # BLD: 0.06 K/UL (ref 0–0.12)
CREAT UR-MCNC: 131.26 MG/DL
EOSINOPHIL # BLD AUTO: 0.1 K/UL (ref 0–0.51)
EOSINOPHIL NFR BLD: 1.4 % (ref 0–6.9)
ERYTHROCYTE [DISTWIDTH] IN BLOOD BY AUTOMATED COUNT: 49.5 FL (ref 35.9–50)
EST. AVERAGE GLUCOSE BLD GHB EST-MCNC: 128 MG/DL
HBA1C MFR BLD: 6.1 % (ref 4–5.6)
HCT VFR BLD AUTO: 43.7 % (ref 37–47)
HGB BLD-MCNC: 14.2 G/DL (ref 12–16)
IMM GRANULOCYTES # BLD AUTO: 0.02 K/UL (ref 0–0.11)
IMM GRANULOCYTES NFR BLD AUTO: 0.3 % (ref 0–0.9)
LYMPHOCYTES # BLD AUTO: 2.02 K/UL (ref 1–4.8)
LYMPHOCYTES NFR BLD: 27.4 % (ref 22–41)
MCH RBC QN AUTO: 31.4 PG (ref 27–33)
MCHC RBC AUTO-ENTMCNC: 32.5 G/DL (ref 32.2–35.5)
MCV RBC AUTO: 96.7 FL (ref 81.4–97.8)
MICROALBUMIN UR-MCNC: <1.2 MG/DL
MICROALBUMIN/CREAT UR: NORMAL MG/G (ref 0–30)
MONOCYTES # BLD AUTO: 0.78 K/UL (ref 0–0.85)
MONOCYTES NFR BLD AUTO: 10.6 % (ref 0–13.4)
NEUTROPHILS # BLD AUTO: 4.4 K/UL (ref 1.82–7.42)
NEUTROPHILS NFR BLD: 59.5 % (ref 44–72)
NRBC # BLD AUTO: 0 K/UL
NRBC BLD-RTO: 0 /100 WBC (ref 0–0.2)
PLATELET # BLD AUTO: 229 K/UL (ref 164–446)
PMV BLD AUTO: 12.4 FL (ref 9–12.9)
RBC # BLD AUTO: 4.52 M/UL (ref 4.2–5.4)
WBC # BLD AUTO: 7.4 K/UL (ref 4.8–10.8)

## 2025-01-16 PROCEDURE — 82043 UR ALBUMIN QUANTITATIVE: CPT

## 2025-01-16 PROCEDURE — 82306 VITAMIN D 25 HYDROXY: CPT

## 2025-01-16 PROCEDURE — RXMED WILLOW AMBULATORY MEDICATION CHARGE: Performed by: INTERNAL MEDICINE

## 2025-01-16 PROCEDURE — 80307 DRUG TEST PRSMV CHEM ANLYZR: CPT

## 2025-01-16 PROCEDURE — 84439 ASSAY OF FREE THYROXINE: CPT

## 2025-01-16 PROCEDURE — 82570 ASSAY OF URINE CREATININE: CPT

## 2025-01-16 PROCEDURE — 84443 ASSAY THYROID STIM HORMONE: CPT

## 2025-01-16 PROCEDURE — 85025 COMPLETE CBC W/AUTO DIFF WBC: CPT

## 2025-01-16 PROCEDURE — G0480 DRUG TEST DEF 1-7 CLASSES: HCPCS

## 2025-01-16 PROCEDURE — 82607 VITAMIN B-12: CPT

## 2025-01-16 PROCEDURE — 83036 HEMOGLOBIN GLYCOSYLATED A1C: CPT

## 2025-01-16 PROCEDURE — 36415 COLL VENOUS BLD VENIPUNCTURE: CPT

## 2025-01-17 ENCOUNTER — PHARMACY VISIT (OUTPATIENT)
Dept: PHARMACY | Facility: MEDICAL CENTER | Age: 80
End: 2025-01-17
Payer: COMMERCIAL

## 2025-01-17 LAB
25(OH)D3 SERPL-MCNC: 41 NG/ML (ref 30–100)
T4 FREE SERPL-MCNC: 1.2 NG/DL (ref 0.93–1.7)
TSH SERPL-ACNC: 2.79 UIU/ML (ref 0.35–5.5)
VIT B12 SERPL-MCNC: 2104 PG/ML (ref 211–911)

## 2025-01-18 LAB
AMPHET CTO UR CFM-MCNC: NORMAL NG/ML
BARBITURATES CTO UR CFM-MCNC: NEGATIVE NG/ML
BENZODIAZ CTO UR CFM-MCNC: NEGATIVE NG/ML
CANNABINOIDS CTO UR CFM-MCNC: NORMAL NG/ML
COCAINE CTO UR CFM-MCNC: NEGATIVE NG/ML
CREAT UR-MCNC: 139.6 MG/DL (ref 20–400)
DRUG COMMENT 753798: NORMAL
METHADONE CTO UR CFM-MCNC: NEGATIVE NG/ML
OPIATES CTO UR CFM-MCNC: NEGATIVE NG/ML
PCP CTO UR CFM-MCNC: NEGATIVE NG/ML
PROPOXYPH CTO UR CFM-MCNC: NEGATIVE NG/ML

## 2025-01-20 LAB
AMPHET UR CFM-MCNC: >5000 NG/ML
MDA UR CFM-MCNC: <200 NG/ML
MDEA UR CFM-MCNC: <200 NG/ML
MDMA UR CFM-MCNC: <200 NG/ML
METHAMPHET UR CFM-MCNC: <200 NG/ML
PHENTERMINE UR CFM-MCNC: <200 NG/ML

## 2025-01-21 ENCOUNTER — HOSPITAL ENCOUNTER (OUTPATIENT)
Dept: LAB | Facility: MEDICAL CENTER | Age: 80
End: 2025-01-21
Attending: INTERNAL MEDICINE
Payer: MEDICARE

## 2025-01-21 LAB — THC UR CFM-MCNC: 26 NG/ML

## 2025-01-21 PROCEDURE — 80053 COMPREHEN METABOLIC PANEL: CPT

## 2025-01-21 PROCEDURE — 80061 LIPID PANEL: CPT

## 2025-01-21 PROCEDURE — 36415 COLL VENOUS BLD VENIPUNCTURE: CPT

## 2025-01-22 ENCOUNTER — OFFICE VISIT (OUTPATIENT)
Dept: MEDICAL GROUP | Facility: PHYSICIAN GROUP | Age: 80
End: 2025-01-22
Payer: MEDICARE

## 2025-01-22 VITALS
DIASTOLIC BLOOD PRESSURE: 62 MMHG | OXYGEN SATURATION: 96 % | HEIGHT: 62 IN | BODY MASS INDEX: 27.14 KG/M2 | HEART RATE: 64 BPM | TEMPERATURE: 97.7 F | WEIGHT: 147.5 LBS | RESPIRATION RATE: 16 BRPM | SYSTOLIC BLOOD PRESSURE: 122 MMHG

## 2025-01-22 DIAGNOSIS — R73.03 PREDIABETES: ICD-10-CM

## 2025-01-22 DIAGNOSIS — M25.511 CHRONIC RIGHT SHOULDER PAIN: ICD-10-CM

## 2025-01-22 DIAGNOSIS — F90.0 ATTENTION DEFICIT HYPERACTIVITY DISORDER (ADHD), PREDOMINANTLY INATTENTIVE TYPE: ICD-10-CM

## 2025-01-22 DIAGNOSIS — G89.29 CHRONIC RIGHT SHOULDER PAIN: ICD-10-CM

## 2025-01-22 DIAGNOSIS — G25.0 ESSENTIAL TREMOR: ICD-10-CM

## 2025-01-22 LAB
ALBUMIN SERPL BCP-MCNC: 4.2 G/DL (ref 3.2–4.9)
ALBUMIN/GLOB SERPL: 1.4 G/DL
ALP SERPL-CCNC: 55 U/L (ref 30–99)
ALT SERPL-CCNC: 15 U/L (ref 2–50)
ANION GAP SERPL CALC-SCNC: 11 MMOL/L (ref 7–16)
AST SERPL-CCNC: 23 U/L (ref 12–45)
BILIRUB SERPL-MCNC: 1 MG/DL (ref 0.1–1.5)
BUN SERPL-MCNC: 11 MG/DL (ref 8–22)
CALCIUM ALBUM COR SERPL-MCNC: 9.7 MG/DL (ref 8.5–10.5)
CALCIUM SERPL-MCNC: 9.9 MG/DL (ref 8.5–10.5)
CHLORIDE SERPL-SCNC: 104 MMOL/L (ref 96–112)
CHOLEST SERPL-MCNC: 255 MG/DL (ref 100–199)
CO2 SERPL-SCNC: 26 MMOL/L (ref 20–33)
CREAT SERPL-MCNC: 0.7 MG/DL (ref 0.5–1.4)
GFR SERPLBLD CREATININE-BSD FMLA CKD-EPI: 88 ML/MIN/1.73 M 2
GLOBULIN SER CALC-MCNC: 3.1 G/DL (ref 1.9–3.5)
GLUCOSE SERPL-MCNC: 94 MG/DL (ref 65–99)
HDLC SERPL-MCNC: 72 MG/DL
LDLC SERPL CALC-MCNC: 167 MG/DL
POTASSIUM SERPL-SCNC: 4.4 MMOL/L (ref 3.6–5.5)
PROT SERPL-MCNC: 7.3 G/DL (ref 6–8.2)
SODIUM SERPL-SCNC: 141 MMOL/L (ref 135–145)
TRIGL SERPL-MCNC: 81 MG/DL (ref 0–149)

## 2025-01-22 PROCEDURE — 99214 OFFICE O/P EST MOD 30 MIN: CPT | Performed by: INTERNAL MEDICINE

## 2025-01-22 PROCEDURE — RXMED WILLOW AMBULATORY MEDICATION CHARGE: Performed by: INTERNAL MEDICINE

## 2025-01-22 PROCEDURE — G2211 COMPLEX E/M VISIT ADD ON: HCPCS | Performed by: INTERNAL MEDICINE

## 2025-01-22 PROCEDURE — 3078F DIAST BP <80 MM HG: CPT | Performed by: INTERNAL MEDICINE

## 2025-01-22 PROCEDURE — 3074F SYST BP LT 130 MM HG: CPT | Performed by: INTERNAL MEDICINE

## 2025-01-22 RX ORDER — DEXTROAMPHETAMINE SACCHARATE, AMPHETAMINE ASPARTATE, DEXTROAMPHETAMINE SULFATE AND AMPHETAMINE SULFATE 3.75; 3.75; 3.75; 3.75 MG/1; MG/1; MG/1; MG/1
15 TABLET ORAL 2 TIMES DAILY
COMMUNITY
End: 2025-01-23 | Stop reason: SDUPTHER

## 2025-01-22 RX ORDER — PROPRANOLOL HYDROCHLORIDE 40 MG/1
20 TABLET ORAL 2 TIMES DAILY
Qty: 14 TABLET | Refills: 0
Start: 2025-01-22 | End: 2025-02-05

## 2025-01-22 RX ORDER — PRIMIDONE 50 MG/1
50 TABLET ORAL 2 TIMES DAILY
Qty: 60 TABLET | Refills: 2 | Status: SHIPPED | OUTPATIENT
Start: 2025-01-22

## 2025-01-22 RX ORDER — DEXTROAMPHETAMINE SACCHARATE, AMPHETAMINE ASPARTATE, DEXTROAMPHETAMINE SULFATE AND AMPHETAMINE SULFATE 3.75; 3.75; 3.75; 3.75 MG/1; MG/1; MG/1; MG/1
15 TABLET ORAL 2 TIMES DAILY
Qty: 60 TABLET | Refills: 0 | Status: CANCELLED | OUTPATIENT
Start: 2025-01-22 | End: 2025-02-21

## 2025-01-22 ASSESSMENT — PATIENT HEALTH QUESTIONNAIRE - PHQ9
SUM OF ALL RESPONSES TO PHQ QUESTIONS 1-9: 6
5. POOR APPETITE OR OVEREATING: 0 - NOT AT ALL
CLINICAL INTERPRETATION OF PHQ2 SCORE: 2

## 2025-01-22 ASSESSMENT — FIBROSIS 4 INDEX: FIB4 SCORE: 2.02

## 2025-01-22 NOTE — PROGRESS NOTES
Subjective:   Chief Complaint/History of Present Illness:  Cherelle Crystal is a 79 y.o. female established patient who presents today to discuss medical problems as listed below. Cherelle is unaccompanied for today's visit.    History of Present Illness  The patient presents for evaluation of essential tremor, shoulder pain, and prediabetes.    She has been experiencing an exacerbation of her right > left upper extremity tremors, which have become more pronounced even at rest. Despite increasing her propranolol dosage to 40 mg twice daily, as previously advised, she reports no significant improvement in her symptoms. She has not observed any worsening of her tremors on days when she inadvertently missed her propranolol dose. She is seeking information on potential alternative medications for her condition. She has expressed interest in exploring Alex Chi as a potential therapeutic intervention for her tremors.    She has previously received shoulder injections from Dr. Vazquez Stephenson at the [facility] and is considering another round of treatment. She experiences bilateral shoulder pain, with the right side being more affected than the left. She has been using CBD topical cream, as recommended by her physical therapist at the [facility], and reports that it contains some THC. She does not smoke or use marijuana but admits to occasional use of CBD gummies, approximately once a week, for pain management.    She has never been prescribed cholesterol-lowering medication. She has a history of lung-related illnesses dating back to her infancy, including pneumonia at 6 months old.    SOCIAL HISTORY  She does not smoke or use marijuana.    FAMILY HISTORY  Her father  of cancer. Her mother was a heavy smoker and  after a major car accident. Her aunt was a diabetic. Her sister is a diabetic, but it did not start until her 60s.    MEDICATIONS  Current: propranolol, Adderall    IMMUNIZATIONS  She received the  "influenza vaccine.       Current Medications:  Current Outpatient Medications Ordered in Epic   Medication Sig Dispense Refill    amphetamine-dextroamphetamine (ADDERALL, 15MG,) 15 MG tablet Take 1 Tablet by mouth 2 times a day for 30 days. 60 Tablet 0    propranolol (INDERAL) 40 MG Tab Take 0.5 Tablets by mouth 2 times a day for 14 days. 14 Tablet 0    primidone (MYSOLINE) 50 MG Tab Take 1 Tablet by mouth 2 times a day. Start in 2 weeks (after you are off the propranolol) 60 Tablet 2    triamcinolone acetonide (KENALOG) 0.1 % Cream Apply 1 Application topically 2 times a day. 80 g 2    mirabegron ER (MYRBETRIQ) 25 MG TABLET SR 24 HR Take 1 tablet by mouth every evening. 100 Tablet 3    pantoprazole (PROTONIX) 20 MG tablet Take 1 Tablet by mouth every day. 100 Tablet 3    albuterol 108 (90 Base) MCG/ACT Aero Soln inhalation aerosol Inhale 1-2 Puffs every 6 hours as needed for Shortness of Breath.      cetirizine (ZYRTEC) 10 MG Tab Take 10 mg by mouth 2 times a day.       No current [facility]-ordered facility-administered medications on file.          Objective:   Physical Exam:    Vitals: /62 (BP Location: Right arm, Patient Position: Sitting, BP Cuff Size: Adult)   Pulse 64   Temp 36.5 °C (97.7 °F) (Temporal)   Resp 16   Ht 1.575 m (5' 2\")   Wt 66.9 kg (147 lb 8 oz)   SpO2 96%    BMI: Body mass index is 26.98 kg/m².  Physical Exam  Constitutional:       General: She is not in acute distress.     Appearance: Normal appearance. She is not ill-appearing.   HENT:      Right Ear: External ear normal.      Left Ear: External ear normal.   Eyes:      General: No scleral icterus.     Conjunctiva/sclera: Conjunctivae normal.   Cardiovascular:      Rate and Rhythm: Normal rate and regular rhythm.      Pulses: Normal pulses.   Pulmonary:      Effort: Pulmonary effort is normal. No respiratory distress.      Breath sounds: No wheezing or rhonchi.   Abdominal:      General: Bowel sounds are normal.      Palpations: " Abdomen is soft.   Musculoskeletal:      Right lower leg: No edema.      Left lower leg: No edema.   Skin:     General: Skin is warm and dry.      Findings: No rash.   Neurological:      Coordination: Coordination abnormal.      Gait: Gait normal.      Comments: Right upper extremity tremor   Psychiatric:         Behavior: Behavior normal.         Thought Content: Thought content normal.         Judgment: Judgment normal.      Comments: More personal stressors recently           Results  Laboratory Studies  Blood sugar average is 128. LDL cholesterol increased from 161 to 167. Total cholesterol increased from 243 to 255. Kidney function is 88%. Liver and protein levels were normal. White cells, red cells, platelets, and immune cells were all in the perfect range. Metabolic panel including kidneys, liver, salt levels, blood sugar, protein levels were all in the goal range.    Imaging  Cardiac score from 3.5 years ago showed a little bit of plaque in the short segment of the left main artery.    Assessment & Plan  1. Essential Tremor, progressing.  The patient's tremor appears to be progressing, as evidenced by the lack of response to increased doses of propranolol. She has been informed about the potential side effects of primidone, including fatigue. She has also been advised to consider Alex Chi as a potential therapeutic intervention for her tremors. She will reduce her propranolol dosage to 20 mg twice daily for a period of 2 weeks, after which she will discontinue the medication. A trial of primidone will be initiated, starting with a dosage of 50 mg twice daily. A referral to Dr. Cervantes, a neurologist, has been made for further evaluation and management of her essential tremor. She has been provided with educational materials on essential tremor and its various treatment options. Her Adderall prescription will be renewed, but she has been advised to discuss the possibility of reducing the dosage with   "Billy.    2. Chronic right > left shoulder Pain.  A referral to Dr. Stephenson at the [facility] has been made for further evaluation and management of her shoulder pain. She has previously received injections in her right shoulder and is considering another round of treatment. Previously saw him when he was at [facility].    3. Prediabetes.  Her average blood glucose level is slightly elevated, consistent with prediabetes. She has been advised to monitor her diet and maintain regular physical activity to manage her blood glucose levels.    4. Elevated Cholesterol.  Her LDL cholesterol levels have increased from 161 to 167, and her total cholesterol has increased from 243 to 255. She has been advised to consider starting a low-dose cholesterol medication to mitigate the risk of plaque accumulation and potential blockages. However, she prefers to wait and focus on other health issues at this time.    5. Attention deficit hyperactivity disorder (ADHD), predominantly inattentive type  Diagnosed at 41 with good results on treatment. However, now that her essential tremors are progressing we discussed working to get her off this medicine. She agrees and will start self reducing to either 1/2 tablet or once daily over the coming months.     Obtained and reviewed patient utilization report from [facility] pharmacy database on 1/22/2025 10:04 PM  prior to writing prescription for controlled substance II, III or IV per [place] bill . Based on assessment of the report, the prescription is medically necessary.     Patient understands this prescription is a controlled substance which is potentially habit-forming and its use is regulated by the NE[place]. We also discussed the new \"black box\" warning regarding the lethal combination of opioids and benzodiazepines. Refills are subject to terms of a controlled substance agreement and patient has an updated one on file. Most recent UDS is appropriate. Any refill requires an " office visit. Narcotics have may adverse effects and the risks of addiction, accidental overdose and death were emphasized. Provided prescriptions for the next three months.      PROCEDURE  The patient has previously received shoulder injections from Dr. Vazquez Stephenson at the [facility].      Assessment and Plan:   Cherelle is a 79 y.o. female with the following:  Problem List Items Addressed This Visit       Attention deficit hyperactivity disorder (ADHD), predominantly inattentive type    Relevant Medications    amphetamine-dextroamphetamine (ADDERALL, 15MG,) 15 MG tablet    Other Relevant Orders    Controlled Substance Treatment Agreement    Chronic right shoulder pain    Relevant Medications    primidone (MYSOLINE) 50 MG Tab    Other Relevant Orders    Referral to Orthopedics    Essential tremor    Relevant Medications    propranolol (INDERAL) 40 MG Tab    primidone (MYSOLINE) 50 MG Tab    Other Relevant Orders    Referral to Neurology    Prediabetes          RTC: Return in about 3 months (around 4/22/2025).    I spent a total of 31 minutes with record review, exam, communication with the patient, communication with other providers, and documentation of this encounter.    Verbal consent was acquired by the patient to use [employer] ambient listening note generation during this visit Yes     Billing : secondary to the complexity of this patient's illnesses and their interactions.  All problems listed were discussed during the office visit, medications were evaluated and complexities were discussed as well as plan for the future.      PLEASE NOTE: This dictation was created using voice recognition software. I have made every reasonable attempt to correct obvious errors, but I expect that there are errors of grammar and possibly content that I did not discover before finalizing the note.      Farhana Quintanilla, DO  Geriatric and Internal Medicine  [facility]

## 2025-01-23 PROCEDURE — RXMED WILLOW AMBULATORY MEDICATION CHARGE: Performed by: INTERNAL MEDICINE

## 2025-01-23 RX ORDER — DEXTROAMPHETAMINE SACCHARATE, AMPHETAMINE ASPARTATE, DEXTROAMPHETAMINE SULFATE AND AMPHETAMINE SULFATE 3.75; 3.75; 3.75; 3.75 MG/1; MG/1; MG/1; MG/1
15 TABLET ORAL 2 TIMES DAILY
Qty: 60 TABLET | Refills: 0 | Status: SHIPPED | OUTPATIENT
Start: 2025-01-23 | End: 2025-03-01

## 2025-01-30 ENCOUNTER — PHARMACY VISIT (OUTPATIENT)
Dept: PHARMACY | Facility: MEDICAL CENTER | Age: 80
End: 2025-01-30
Payer: COMMERCIAL

## 2025-02-11 ENCOUNTER — PATIENT MESSAGE (OUTPATIENT)
Dept: MEDICAL GROUP | Facility: PHYSICIAN GROUP | Age: 80
End: 2025-02-11
Payer: MEDICARE

## 2025-02-11 NOTE — TELEPHONE ENCOUNTER
She sees neurology in 2 days so I recommend stopping the primidone and waiting to discuss with neurology

## 2025-02-13 ENCOUNTER — OFFICE VISIT (OUTPATIENT)
Dept: NEUROLOGY | Facility: MEDICAL CENTER | Age: 80
End: 2025-02-13
Attending: PSYCHIATRY & NEUROLOGY
Payer: MEDICARE

## 2025-02-13 VITALS
OXYGEN SATURATION: 94 % | HEART RATE: 102 BPM | BODY MASS INDEX: 26.98 KG/M2 | TEMPERATURE: 97.3 F | HEIGHT: 62 IN | RESPIRATION RATE: 16 BRPM | WEIGHT: 146.61 LBS | SYSTOLIC BLOOD PRESSURE: 120 MMHG | DIASTOLIC BLOOD PRESSURE: 60 MMHG

## 2025-02-13 DIAGNOSIS — G25.0 ESSENTIAL TREMOR: ICD-10-CM

## 2025-02-13 DIAGNOSIS — I10 PRIMARY HYPERTENSION: ICD-10-CM

## 2025-02-13 PROCEDURE — 99204 OFFICE O/P NEW MOD 45 MIN: CPT | Performed by: PSYCHIATRY & NEUROLOGY

## 2025-02-13 PROCEDURE — 3078F DIAST BP <80 MM HG: CPT | Performed by: PSYCHIATRY & NEUROLOGY

## 2025-02-13 PROCEDURE — 3074F SYST BP LT 130 MM HG: CPT | Performed by: PSYCHIATRY & NEUROLOGY

## 2025-02-13 PROCEDURE — 99211 OFF/OP EST MAY X REQ PHY/QHP: CPT | Performed by: PSYCHIATRY & NEUROLOGY

## 2025-02-13 RX ORDER — PROPRANOLOL HYDROCHLORIDE 40 MG/1
60 TABLET ORAL 2 TIMES DAILY
Qty: 270 TABLET | Refills: 2 | Status: SHIPPED | OUTPATIENT
Start: 2025-02-13

## 2025-02-13 ASSESSMENT — PATIENT HEALTH QUESTIONNAIRE - PHQ9
SUM OF ALL RESPONSES TO PHQ QUESTIONS 1-9: 7
CLINICAL INTERPRETATION OF PHQ2 SCORE: 2
5. POOR APPETITE OR OVEREATING: 1 - SEVERAL DAYS

## 2025-02-13 ASSESSMENT — FIBROSIS 4 INDEX: FIB4 SCORE: 2.02

## 2025-02-13 NOTE — PROGRESS NOTES
Chief Complaint   Patient presents with    New Patient     Movement disorder        History of present illness:  Cherelle Crystal 79 y.o. female with essential tremor.  She has attempted treatment with propranolol 40 mg twice daily, which was not effective therefore her primary doctor prescribed primidone 50 mg twice daily.  Propranolol worked well initially but has not been as effective recently.     This patient is interested in alternative treatment for tremors including asia chi. Primidone was effective however she developed blurry vision and her mind was hazy so she quit taking it. Primidone even at 25mg twice daily affected her vision.     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 She has trouble picking up a cup.     Past medical history:   Past Medical History:   Diagnosis Date    Hyperlipidemia     Iron deficiency anemia 11/14/2022     Latest Reference Range & Units 08/29/22 11:15 Hemoglobin 12.0 - 16.0 g/dL 10.3 (L) Hematocrit 37.0 - 47.0 % 31.3 (L) MCV 81.4 - 97.8 fL 97.2   She had GI bleed noted in Aug 2022. She developed melena and hematochezia at the same time. She was hospitalized with upper endoscopy showing gastritis (no active bleed). Colonoscopy demonstrated 1 small polyp, adenoma. She was placed on PPI and advised to    Leg paresthesia 6/14/2021    She reports abnormal sensations in her bilateral lower extremities. Starts int he feet and goes up to the knees with some additional abnormalities in the bilateral upper extremities. No recent lab work. No preceding injury. Denies known side effects from from medications.       Past surgical history:   Past Surgical History:   Procedure Laterality Date    HI COLONOSCOPY-FLEXIBLE N/A 8/30/2022    Procedure: COLONOSCOPY;  Surgeon: Rich Agee M.D.;  Location: SURGERY [facility];  Service: Gastroenterology    HI UPPER GI ENDOSCOPY,DIAGNOSIS N/A 8/29/2022    Procedure: GASTROSCOPY;  Surgeon: Rich Agee M.D.;   Location: SURGERY [facility];  Service: Gastroenterology       Family history:   Family History   Problem Relation Age of Onset    Allergies Mother     Cancer Father         prostate    Diabetes Sister     Heart Disease Sister     Stroke Brother     Stroke Paternal Grandmother     Cancer Paternal Grandfather         throat    No Known Problems Sister     Hyperlipidemia Son     Heart Disease Daughter     Hyperlipidemia Daughter        Social history:   Social History     Socioeconomic History    Marital status:      Spouse name: Not on file    Number of children: Not on file    Years of education: Not on file    Highest education level: Not on file   Occupational History    Not on file   Tobacco Use    Smoking status: Never    Smokeless tobacco: Never   Vaping Use    Vaping status: Never Used   Substance and Sexual Activity    Alcohol use: Not Currently     Comment: occasianally wine    Drug use: Never    Sexual activity: Not on file   Other Topics Concern    Not on file   Social History Narrative    Not on file     Social Drivers of Health     Financial Resource Strain: Not on file   Food Insecurity: Not on file   Transportation Needs: Not on file   Physical Activity: Not on file   Stress: Not on file   Social Connections: Not on file   Intimate Partner Violence: Not on file   Housing Stability: Not on file       Current medications:   Current Outpatient Medications   Medication    amphetamine-dextroamphetamine (ADDERALL, 15MG,) 15 MG tablet    mirabegron ER (MYRBETRIQ) 25 MG TABLET SR 24 HR    pantoprazole (PROTONIX) 20 MG tablet    albuterol 108 (90 Base) MCG/ACT Aero Soln inhalation aerosol    cetirizine (ZYRTEC) 10 MG Tab    triamcinolone acetonide (KENALOG) 0.1 % Cream     No current facility-administered medications for this visit.       Medication Allergy:  Allergies   Allergen Reactions    Penicillin G Rash     Rash all over    Lavender Oil Rash             Physical examination:   Vitals:     "02/13/25 0803   BP: 120/60   BP Location: Left arm   Patient Position: Sitting   BP Cuff Size: Adult   Pulse: (!) 102   Resp: 16   Temp: 36.3 °C (97.3 °F)   TempSrc: Temporal   SpO2: 94%   Weight: 66.5 kg (146 lb 9.7 oz)   Height: 1.575 m (5' 2\")     Neurological Exam  Mental Status   Speech is normal.    Motor   The following abnormal movements were seen: Moderate action/postural tremors bilaterally   .      Gait  Casual gait is normal including stance, stride, and arm swing.      ASSESSMENT AND PLAN:  Problem List Items Addressed This Visit          Neurology Medicine Problems    Primary hypertension       Other    Essential tremor       1. Essential tremor    2. Primary hypertension    The patient is significantly bothered by her tremors. The primidone was not well tolerated due to blurry vision and propranolol was not as effective at a dose of 40mg twice daily. I have provided instructions for her to increase propranolol to 60mg twice daily and to 80mg twice daily if needed. Propranolol was effective previously in the past therefore it is reasonable to try a higher dose.     FOLLOW-UP:   Return in about 4 weeks (around 3/13/2025) for f/u with me before I leave (3/5) .    Total time spent for the day for this patient unrelated to procedure time is: 20 minutes. I spent 12 minutes in face to face time and I spent 4 minutes pre-charting and 4 minutes in post-visit documentation.      Dr. Cristopher Machado D.O.  [facility] Neurology  Movement Disorders Specialist    "

## 2025-02-13 NOTE — PATIENT INSTRUCTIONS
Increase propranolol to 60mg (1.5 pills) twice daily     If this dose is not effective for your tremors, after 2 weeks, increase further to 80mg ( 2 tabs ) twice daily

## 2025-03-05 ENCOUNTER — OFFICE VISIT (OUTPATIENT)
Dept: NEUROLOGY | Facility: MEDICAL CENTER | Age: 80
End: 2025-03-05
Attending: PSYCHIATRY & NEUROLOGY
Payer: MEDICARE

## 2025-03-05 VITALS
SYSTOLIC BLOOD PRESSURE: 118 MMHG | TEMPERATURE: 96.7 F | DIASTOLIC BLOOD PRESSURE: 70 MMHG | RESPIRATION RATE: 16 BRPM | HEART RATE: 62 BPM | WEIGHT: 145.72 LBS | OXYGEN SATURATION: 94 % | BODY MASS INDEX: 26.82 KG/M2 | HEIGHT: 62 IN

## 2025-03-05 DIAGNOSIS — G25.0 ESSENTIAL TREMOR: ICD-10-CM

## 2025-03-05 PROCEDURE — 3074F SYST BP LT 130 MM HG: CPT | Performed by: PSYCHIATRY & NEUROLOGY

## 2025-03-05 PROCEDURE — 3078F DIAST BP <80 MM HG: CPT | Performed by: PSYCHIATRY & NEUROLOGY

## 2025-03-05 PROCEDURE — 99211 OFF/OP EST MAY X REQ PHY/QHP: CPT | Performed by: PSYCHIATRY & NEUROLOGY

## 2025-03-05 PROCEDURE — 99213 OFFICE O/P EST LOW 20 MIN: CPT | Performed by: PSYCHIATRY & NEUROLOGY

## 2025-03-05 PROCEDURE — RXMED WILLOW AMBULATORY MEDICATION CHARGE: Performed by: PSYCHIATRY & NEUROLOGY

## 2025-03-05 RX ORDER — PROPRANOLOL HYDROCHLORIDE 80 MG/1
80 TABLET ORAL 2 TIMES DAILY
Qty: 180 TABLET | Refills: 2 | Status: SHIPPED | OUTPATIENT
Start: 2025-03-05

## 2025-03-05 ASSESSMENT — PATIENT HEALTH QUESTIONNAIRE - PHQ9
SUM OF ALL RESPONSES TO PHQ QUESTIONS 1-9: 4
CLINICAL INTERPRETATION OF PHQ2 SCORE: 1
5. POOR APPETITE OR OVEREATING: 0 - NOT AT ALL

## 2025-03-05 ASSESSMENT — FIBROSIS 4 INDEX: FIB4 SCORE: 2.02

## 2025-03-05 NOTE — PROGRESS NOTES
Chief Complaint   Patient presents with    Follow-Up     Movement disorder        History of present illness:  Cherelle Crystal 79 y.o. female with essential tremor.  She has attempted treatment with propranolol 40 mg twice daily, which was not effective therefore her primary doctor prescribed primidone 50 mg twice daily.  Propranolol worked well initially but has not been as effective recently.     This patient is interested in alternative treatment for tremors including saia chi. Primidone was effective however she developed blurry vision and her mind was hazy so she quit taking it. Primidone even at 25mg twice daily affected her vision.     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 She has trouble picking up a cup.     Past medical history:   Past Medical History:   Diagnosis Date    Hyperlipidemia     Iron deficiency anemia 11/14/2022     Latest Reference Range & Units 08/29/22 11:15 Hemoglobin 12.0 - 16.0 g/dL 10.3 (L) Hematocrit 37.0 - 47.0 % 31.3 (L) MCV 81.4 - 97.8 fL 97.2   She had GI bleed noted in Aug 2022. She developed melena and hematochezia at the same time. She was hospitalized with upper endoscopy showing gastritis (no active bleed). Colonoscopy demonstrated 1 small polyp, adenoma. She was placed on PPI and advised to    Leg paresthesia 6/14/2021    She reports abnormal sensations in her bilateral lower extremities. Starts int he feet and goes up to the knees with some additional abnormalities in the bilateral upper extremities. No recent lab work. No preceding injury. Denies known side effects from from medications.       Past surgical history:   Past Surgical History:   Procedure Laterality Date    SD COLONOSCOPY-FLEXIBLE N/A 8/30/2022    Procedure: COLONOSCOPY;  Surgeon: Rich Agee M.D.;  Location: SURGERY [facility];  Service: Gastroenterology    SD UPPER GI ENDOSCOPY,DIAGNOSIS N/A 8/29/2022    Procedure: GASTROSCOPY;  Surgeon: Rich Agee M.D.;  Location:  SURGERY [facility];  Service: Gastroenterology       Family history:   Family History   Problem Relation Age of Onset    Allergies Mother     Cancer Father         prostate    Diabetes Sister     Heart Disease Sister     Stroke Brother     Stroke Paternal Grandmother     Cancer Paternal Grandfather         throat    No Known Problems Sister     Hyperlipidemia Son     Heart Disease Daughter     Hyperlipidemia Daughter        Social history:   Social History     Socioeconomic History    Marital status:      Spouse name: Not on file    Number of children: Not on file    Years of education: Not on file    Highest education level: Not on file   Occupational History    Not on file   Tobacco Use    Smoking status: Never    Smokeless tobacco: Never   Vaping Use    Vaping status: Never Used   Substance and Sexual Activity    Alcohol use: Yes     Comment: occasianally wine    Drug use: Never    Sexual activity: Not on file   Other Topics Concern    Not on file   Social History Narrative    Not on file     Social Drivers of Health     Financial Resource Strain: Not on file   Food Insecurity: Not on file   Transportation Needs: Not on file   Physical Activity: Not on file   Stress: Not on file   Social Connections: Not on file   Intimate Partner Violence: Not on file   Housing Stability: Not on file       Current medications:   Current Outpatient Medications   Medication    propranolol (INDERAL) 80 MG Tab    mirabegron ER (MYRBETRIQ) 25 MG TABLET SR 24 HR    pantoprazole (PROTONIX) 20 MG tablet    albuterol 108 (90 Base) MCG/ACT Aero Soln inhalation aerosol     No current facility-administered medications for this visit.       Medication Allergy:  Allergies   Allergen Reactions    Penicillin G Rash     Rash all over    Lavender Oil Rash             Physical examination:   Vitals:    03/05/25 1404   BP: 118/70   BP Location: Left arm   Patient Position: Sitting   BP Cuff Size: Adult   Pulse: 62   Resp: 16   Temp: 35.9  "°C (96.7 °F)   TempSrc: Temporal   SpO2: 94%   Weight: 66.1 kg (145 lb 11.6 oz)   Height: 1.575 m (5' 2\")     Neurological Exam  Mental Status   Speech is normal.    Motor   The following abnormal movements were seen: Mild action/postural tremors bilaterally   .      Gait  Casual gait is normal including stance, stride, and arm swing.      ASSESSMENT AND PLAN:  Problem List Items Addressed This Visit       Essential tremor    Relevant Medications    propranolol (INDERAL) 80 MG Tab         1. Essential tremor  - propranolol (INDERAL) 80 MG Tab; Take 1 Tablet by mouth 2 times a day.  Dispense: 180 Tablet; Refill: 2      Her essential tremors are significantly improved after increasing propranolol to 80mg twice daily. On this dose, her tremors are manageable. I have written a long-term Rx for propranolol 80mg BID. She will follow-up with her PCP and return to neurology PRN if tremors worsen.     FOLLOW-UP:   Return if symptoms worsen or fail to improve.    Total time spent for the day for this patient unrelated to procedure time is: 20 minutes. I spent 15 minutes in face to face time and I spent 2 minutes pre-charting and 3 minutes in post-visit documentation.      Dr. Cristopher Machado D.O.  [facility] Neurology  Movement Disorders Specialist    "

## 2025-03-10 ENCOUNTER — PHARMACY VISIT (OUTPATIENT)
Dept: PHARMACY | Facility: MEDICAL CENTER | Age: 80
End: 2025-03-10
Payer: COMMERCIAL

## 2025-04-17 ENCOUNTER — OFFICE VISIT (OUTPATIENT)
Dept: MEDICAL GROUP | Facility: PHYSICIAN GROUP | Age: 80
End: 2025-04-17
Payer: MEDICARE

## 2025-04-17 VITALS
DIASTOLIC BLOOD PRESSURE: 80 MMHG | SYSTOLIC BLOOD PRESSURE: 122 MMHG | BODY MASS INDEX: 26.89 KG/M2 | OXYGEN SATURATION: 94 % | HEIGHT: 62 IN | TEMPERATURE: 97.4 F | HEART RATE: 63 BPM | WEIGHT: 146.1 LBS

## 2025-04-17 DIAGNOSIS — G25.0 ESSENTIAL TREMOR: ICD-10-CM

## 2025-04-17 DIAGNOSIS — K21.9 GASTROESOPHAGEAL REFLUX DISEASE WITHOUT ESOPHAGITIS: ICD-10-CM

## 2025-04-17 DIAGNOSIS — M54.6 ACUTE RIGHT-SIDED THORACIC BACK PAIN: ICD-10-CM

## 2025-04-17 DIAGNOSIS — F90.0 ATTENTION DEFICIT HYPERACTIVITY DISORDER (ADHD), PREDOMINANTLY INATTENTIVE TYPE: ICD-10-CM

## 2025-04-17 DIAGNOSIS — N39.41 URGENCY INCONTINENCE: ICD-10-CM

## 2025-04-17 PROCEDURE — 99214 OFFICE O/P EST MOD 30 MIN: CPT | Performed by: INTERNAL MEDICINE

## 2025-04-17 PROCEDURE — RXMED WILLOW AMBULATORY MEDICATION CHARGE: Performed by: INTERNAL MEDICINE

## 2025-04-17 PROCEDURE — 3074F SYST BP LT 130 MM HG: CPT | Performed by: INTERNAL MEDICINE

## 2025-04-17 PROCEDURE — 3079F DIAST BP 80-89 MM HG: CPT | Performed by: INTERNAL MEDICINE

## 2025-04-17 RX ORDER — DEXTROAMPHETAMINE SACCHARATE, AMPHETAMINE ASPARTATE, DEXTROAMPHETAMINE SULFATE AND AMPHETAMINE SULFATE 3.75; 3.75; 3.75; 3.75 MG/1; MG/1; MG/1; MG/1
15 TABLET ORAL 2 TIMES DAILY
Qty: 60 TABLET | Refills: 0 | Status: SHIPPED | OUTPATIENT
Start: 2025-05-17 | End: 2025-06-16

## 2025-04-17 RX ORDER — PANTOPRAZOLE SODIUM 20 MG/1
20 TABLET, DELAYED RELEASE ORAL DAILY
Qty: 100 TABLET | Refills: 3 | Status: SHIPPED | OUTPATIENT
Start: 2025-04-17

## 2025-04-17 RX ORDER — DEXTROAMPHETAMINE SACCHARATE, AMPHETAMINE ASPARTATE, DEXTROAMPHETAMINE SULFATE AND AMPHETAMINE SULFATE 3.75; 3.75; 3.75; 3.75 MG/1; MG/1; MG/1; MG/1
15 TABLET ORAL 2 TIMES DAILY
Qty: 60 TABLET | Refills: 0 | Status: SHIPPED | OUTPATIENT
Start: 2025-06-16 | End: 2025-07-16

## 2025-04-17 RX ORDER — DEXTROAMPHETAMINE SACCHARATE, AMPHETAMINE ASPARTATE, DEXTROAMPHETAMINE SULFATE AND AMPHETAMINE SULFATE 3.75; 3.75; 3.75; 3.75 MG/1; MG/1; MG/1; MG/1
15 TABLET ORAL 2 TIMES DAILY
Qty: 60 TABLET | Refills: 0 | Status: SHIPPED | OUTPATIENT
Start: 2025-04-17 | End: 2025-05-18

## 2025-04-17 ASSESSMENT — FIBROSIS 4 INDEX: FIB4 SCORE: 2.02

## 2025-04-17 NOTE — PROGRESS NOTES
Subjective:   Chief Complaint/History of Present Illness:  Cherelle Crystal is a 79 y.o. female established patient who presents today to discuss medical problems as listed below. Cherelle is unaccompanied for today's visit.    History of Present Illness  The patient presents for evaluation of tremors, back pain, and medication management.    The chief complaint is persistent hand tremors, which have escalated in severity despite the use of propranolol. The tremors interfere with daily activities such as driving and eating, necessitating the use of both hands for tasks like holding a cup. The tremors are more pronounced in the right hand than the left. Consideration is being given to focused ultrasound treatment at [facility], a procedure that can be repeated every 9 months. Participation in a clinical trial for a new medication is also being contemplated. Morning tremors are mild but intensify by midday, prompting a second dose of propranolol between 1:00 PM and 3:00 PM. Adderall has been taken for the past 2 weeks without exacerbating the tremors. Discontinuation of Adderall for a week or two resulted in no change in tremors but did cause fatigue. Previous care under Dr. Bradford included a prescription for primidone, which was discontinued due to adverse effects on vision and cognitive function. The current regimen of propranolol 80 mg twice daily provides some relief but does not completely alleviate the tremors.    A refill of pantoprazole is requested, which is used as needed for stomach discomfort. The current supply has been exhausted. A previous episode of hematochezia led to a colonoscopy and endoscopy. The colonoscopy revealed a single polyp, but no source of bleeding was identified. Excessive use of Aleve or ibuprofen is suspected to have contributed to stomach issues.    Constant back pain is reported, occasionally disrupting sleep. The pain is localized to the right side and is not associated with any spinal  "issues. No heavy lifting has been engaged in recently. Prolonged computer use is suspected to be contributing to the discomfort. Heat application is found beneficial, using a rice pack heated in the microwave for relief. Salonpas patches provide some benefit, and Tylenol or Aleve is taken for pain management.    A refill of Adderall is requested, which is found helpful for maintaining focus during writing tasks.    A refill of bladder medication is requested.    PAST SURGICAL HISTORY:  - Colonoscopy and endoscopy for hematochezia, revealing a single polyp but no source of bleeding.         Current Medications:  Current Outpatient Medications Ordered in Epic   Medication Sig Dispense Refill    pantoprazole (PROTONIX) 20 MG tablet Take 1 Tablet by mouth every day. 100 Tablet 3    amphetamine-dextroamphetamine (ADDERALL) 15 MG tablet Take 1 Tablet by mouth 2 times a day for 30 days. 60 Tablet 0    [START ON 5/17/2025] amphetamine-dextroamphetamine (ADDERALL) 15 MG tablet Take 1 Tablet by mouth 2 times a day for 30 days. DNFU-5/17/25 60 Tablet 0    [START ON 6/16/2025] amphetamine-dextroamphetamine (ADDERALL) 15 MG tablet Take 1 Tablet by mouth 2 times a day for 30 days. DNFU-6/16/25 60 Tablet 0    propranolol (INDERAL) 80 MG Tab Take 1 Tablet by mouth 2 times a day. 180 Tablet 2    mirabegron ER (MYRBETRIQ) 25 MG TABLET SR 24 HR Take 1 tablet by mouth every evening. 100 Tablet 3    albuterol 108 (90 Base) MCG/ACT Aero Soln inhalation aerosol Inhale 1-2 Puffs every 6 hours as needed for Shortness of Breath.       No current [facility]-ordered facility-administered medications on file.          Objective:   Physical Exam:    Vitals: /80   Pulse 63   Temp 36.3 °C (97.4 °F) (Temporal)   Ht 1.575 m (5' 2\")   Wt 66.3 kg (146 lb 1.6 oz)   SpO2 94%    BMI: Body mass index is 26.72 kg/m².  Physical Exam  Constitutional:       General: She is not in acute distress.     Appearance: Normal appearance. She is not " ill-appearing.   HENT:      Right Ear: Ear canal and external ear normal. There is no impacted cerumen.      Left Ear: Ear canal and external ear normal. There is no impacted cerumen.   Eyes:      General: No scleral icterus.     Conjunctiva/sclera: Conjunctivae normal.   Cardiovascular:      Rate and Rhythm: Normal rate and regular rhythm.      Pulses: Normal pulses.   Pulmonary:      Effort: Pulmonary effort is normal. No respiratory distress.      Breath sounds: No wheezing or rhonchi.   Abdominal:      General: Bowel sounds are normal.      Palpations: Abdomen is soft.   Musculoskeletal:         General: Tenderness present.      Right lower leg: No edema.      Left lower leg: No edema.      Comments: tenderness   Skin:     General: Skin is warm and dry.      Findings: No rash.   Neurological:      Gait: Gait normal.   Psychiatric:         Mood and Affect: Mood normal.         Behavior: Behavior normal.         Thought Content: Thought content normal.         Judgment: Judgment normal.           Results  Labs   - Blood count: 01/2025, 14    Assessment & Plan  1. Essential Tremor, right > left.  - Propranolol 80 mg twice a day is somewhat effective but not sufficient to control tremors, which are worse in the right hand.  - Discussed focused ultrasound treatment at [facility] with peers but is unsure about eligibility and insurance coverage.  - Will continue with the current medication regimen and conduct further research on treatment options.  - If symptoms worsen, may consider focused ultrasound therapy or other interventions; Dr. Machado has since left [facility].    2. Gastroesophageal Reflux Disease (GERD).  - Requested a refill for pantoprazole, which is used as needed for stomach discomfort.  - Prescription for pantoprazole 20 mg daily as needed has been renewed.  - Advised to avoid irritants such as NSAIDs and alcohol and to use Tums or Pepcid for faster relief if needed; history of gastritis with iron deficiency  "and melena in 2022 which she associates with NSAIDs.    3. Right upper thoracic back pain.  - Pain is likely muscular, possibly due to ergonomic strain from activities like gardening and computer work.  - Advised to continue using heat and topical treatments and to maintain good back practices.  - Physical therapy or trigger point injections suggested if the pain persists.  - Will consider massage therapy from a professional and topical analgesics    4. ADHD  - Requested a refill for Adderall 15 mg twice daily, which is used to help with focus and writing tasks.  - Potential impact of Adderall on tremors was discussed, but it is not believed to worsen symptoms.  - Prescription for Adderall has been renewed.    Obtained and reviewed patient utilization report from [facility] pharmacy database on 4/17/2025 9:52 AM  prior to writing prescription for controlled substance II, III or IV per [place] bill . Based on assessment of the report, the prescription is medically necessary.     Patient understands this prescription is a controlled substance which is potentially habit-forming and its use is regulated by the [place]. We also discussed the new \"black box\" warning regarding the lethal combination of opioids and benzodiazepines. Refills are subject to terms of a controlled substance agreement and patient has an updated one on file. Most recent UDS is appropriate. Any refill requires an office visit. Narcotics have may adverse effects and the risks of addiction, accidental overdose and death were emphasized. Provided prescriptions for the next three months.      5. Urge incontinence  - Continue mirabegron 25 mg daily for bladder control, continues to be effective and no noted side effects.    Follow-up  - Will follow up in 3 to 4 months.      Assessment and Plan:   Cherelle is a 79 y.o. female with the following:  Problem List Items Addressed This Visit       Acute right-sided thoracic back pain    Attention deficit " hyperactivity disorder (ADHD), predominantly inattentive type    Relevant Medications    amphetamine-dextroamphetamine (ADDERALL) 15 MG tablet    amphetamine-dextroamphetamine (ADDERALL) 15 MG tablet (Start on 5/17/2025)    amphetamine-dextroamphetamine (ADDERALL) 15 MG tablet (Start on 6/16/2025)    Essential tremor    Gastroesophageal reflux disease without esophagitis    Relevant Medications    pantoprazole (PROTONIX) 20 MG tablet    Urgency incontinence          RTC: Return in about 4 months (around 8/17/2025).    I spent a total of 31 minutes with record review, exam, communication with the patient, communication with other providers, and documentation of this encounter.    Verbal consent was acquired by the patient to use [employer] ambient listening note generation during this visit Yes       PLEASE NOTE: This dictation was created using voice recognition software. I have made every reasonable attempt to correct obvious errors, but I expect that there are errors of grammar and possibly content that I did not discover before finalizing the note.      Farhana Quintanilla, DO  Geriatric and Internal Medicine  [facility]

## 2025-04-18 ENCOUNTER — PHARMACY VISIT (OUTPATIENT)
Dept: PHARMACY | Facility: MEDICAL CENTER | Age: 80
End: 2025-04-18
Payer: COMMERCIAL

## 2025-04-25 ENCOUNTER — PHARMACY VISIT (OUTPATIENT)
Dept: PHARMACY | Facility: MEDICAL CENTER | Age: 80
End: 2025-04-25
Payer: COMMERCIAL

## 2025-04-25 PROCEDURE — RXMED WILLOW AMBULATORY MEDICATION CHARGE: Performed by: INTERNAL MEDICINE

## 2025-05-23 PROCEDURE — RXMED WILLOW AMBULATORY MEDICATION CHARGE: Performed by: INTERNAL MEDICINE

## 2025-05-27 ENCOUNTER — PHARMACY VISIT (OUTPATIENT)
Dept: PHARMACY | Facility: MEDICAL CENTER | Age: 80
End: 2025-05-27
Payer: COMMERCIAL

## 2025-07-08 PROCEDURE — RXMED WILLOW AMBULATORY MEDICATION CHARGE: Performed by: INTERNAL MEDICINE

## 2025-07-18 ENCOUNTER — PHARMACY VISIT (OUTPATIENT)
Dept: PHARMACY | Facility: MEDICAL CENTER | Age: 80
End: 2025-07-18
Payer: COMMERCIAL

## 2025-07-21 DIAGNOSIS — N39.41 URGENCY INCONTINENCE: ICD-10-CM

## 2025-07-22 PROCEDURE — RXMED WILLOW AMBULATORY MEDICATION CHARGE: Performed by: INTERNAL MEDICINE

## 2025-07-22 RX ORDER — MIRABEGRON 25 MG/1
25 TABLET, FILM COATED, EXTENDED RELEASE ORAL EVERY EVENING
Qty: 100 TABLET | Refills: 3 | Status: SHIPPED | OUTPATIENT
Start: 2025-07-22

## 2025-07-22 NOTE — TELEPHONE ENCOUNTER
Received request via: Pharmacy    Was the patient seen in the last year in this department? Yes  LOV : 4/17/2025  Does the patient have an active prescription (recently filled or refills available) for medication(s) requested? No    Pharmacy Name: [facility]     Does the patient have group home Plus and need 100-day supply? (This applies to ALL medications) Patient does not have SCP

## 2025-08-04 ENCOUNTER — PHARMACY VISIT (OUTPATIENT)
Dept: PHARMACY | Facility: MEDICAL CENTER | Age: 80
End: 2025-08-04
Payer: COMMERCIAL

## 2025-08-22 ENCOUNTER — PHARMACY VISIT (OUTPATIENT)
Dept: PHARMACY | Facility: MEDICAL CENTER | Age: 80
End: 2025-08-22
Payer: COMMERCIAL

## 2025-08-22 ENCOUNTER — OFFICE VISIT (OUTPATIENT)
Dept: MEDICAL GROUP | Facility: PHYSICIAN GROUP | Age: 80
End: 2025-08-22
Payer: MEDICARE

## 2025-08-22 VITALS
SYSTOLIC BLOOD PRESSURE: 126 MMHG | TEMPERATURE: 97.3 F | HEART RATE: 80 BPM | HEIGHT: 62 IN | DIASTOLIC BLOOD PRESSURE: 72 MMHG | WEIGHT: 145 LBS | BODY MASS INDEX: 26.68 KG/M2 | OXYGEN SATURATION: 95 %

## 2025-08-22 DIAGNOSIS — G25.0 ESSENTIAL TREMOR: Primary | ICD-10-CM

## 2025-08-22 DIAGNOSIS — F90.0 ATTENTION DEFICIT HYPERACTIVITY DISORDER (ADHD), PREDOMINANTLY INATTENTIVE TYPE: ICD-10-CM

## 2025-08-22 DIAGNOSIS — E78.2 MIXED HYPERLIPIDEMIA: ICD-10-CM

## 2025-08-22 DIAGNOSIS — R73.03 PREDIABETES: ICD-10-CM

## 2025-08-22 DIAGNOSIS — E55.9 VITAMIN D DEFICIENCY: ICD-10-CM

## 2025-08-22 PROCEDURE — 99214 OFFICE O/P EST MOD 30 MIN: CPT | Performed by: INTERNAL MEDICINE

## 2025-08-22 PROCEDURE — 3078F DIAST BP <80 MM HG: CPT | Performed by: INTERNAL MEDICINE

## 2025-08-22 PROCEDURE — RXMED WILLOW AMBULATORY MEDICATION CHARGE: Performed by: INTERNAL MEDICINE

## 2025-08-22 PROCEDURE — 3074F SYST BP LT 130 MM HG: CPT | Performed by: INTERNAL MEDICINE

## 2025-08-22 RX ORDER — DEXTROAMPHETAMINE SACCHARATE, AMPHETAMINE ASPARTATE, DEXTROAMPHETAMINE SULFATE AND AMPHETAMINE SULFATE 3.75; 3.75; 3.75; 3.75 MG/1; MG/1; MG/1; MG/1
15 TABLET ORAL 2 TIMES DAILY
Qty: 60 TABLET | Refills: 0 | Status: SHIPPED | OUTPATIENT
Start: 2025-09-21 | End: 2025-10-21

## 2025-08-22 RX ORDER — DEXTROAMPHETAMINE SACCHARATE, AMPHETAMINE ASPARTATE, DEXTROAMPHETAMINE SULFATE AND AMPHETAMINE SULFATE 3.75; 3.75; 3.75; 3.75 MG/1; MG/1; MG/1; MG/1
15 TABLET ORAL 2 TIMES DAILY
Qty: 60 TABLET | Refills: 0 | Status: SHIPPED | OUTPATIENT
Start: 2025-08-22 | End: 2025-09-21

## 2025-08-22 RX ORDER — PROPRANOLOL HYDROCHLORIDE 80 MG/1
80 CAPSULE, EXTENDED RELEASE ORAL DAILY
Qty: 30 CAPSULE | Refills: 2 | Status: SHIPPED | OUTPATIENT
Start: 2025-08-22

## 2025-08-22 ASSESSMENT — FIBROSIS 4 INDEX: FIB4 SCORE: 2.05

## (undated) DEVICE — GLOVE, LITE (PAIR)

## (undated) DEVICE — WATER IRRIGATION STERILE 1000ML (12EA/CA)

## (undated) DEVICE — LACTATED RINGERS INJ 1000 ML - (14EA/CA 60CA/PF)

## (undated) DEVICE — SPONGE GAUZE NON-STERILE 4X4 - (2000/CA 10PK/CA)

## (undated) DEVICE — SYRINGE 12 CC LUER TIP - (80/BX) OBSOLETE ITEM

## (undated) DEVICE — SET EXTENSION WITH 2 PORTS (48EA/CA) ***PART #2C8610 IS A SUBSTITUTE*****

## (undated) DEVICE — SYRINGE DISP. 60 CC LL - (30/BX, 12BX/CA)**WHEN THESE ARE GONE ORDER #500206**

## (undated) DEVICE — FORCEP RADIAL JAW 4 STANDARD CAPACITY W/NEEDLE 240CM (40EA/BX)

## (undated) DEVICE — GOWN WARMING STANDARD FLEX - (30/CA)

## (undated) DEVICE — TUBING CLEARLINK DUO-VENT - C-FLO (48EA/CA)

## (undated) DEVICE — TUBE E-T HI-LO CUFF 6.5MM (10EA/BX)

## (undated) DEVICE — GOWN SURGEONS LARGE - (32/CA)

## (undated) DEVICE — SENSOR OXIMETER ADULT SPO2 RD SET (20EA/BX)

## (undated) DEVICE — BLOCK BITE ENDOSCOPIC 2809 - (100/BX) INTERMEDIATE

## (undated) DEVICE — TOWEL STOP TIMEOUT SAFETY FLAG (40EA/CA)

## (undated) DEVICE — KIT CUSTOM PROCEDURE SINGLE FOR ENDO  (15/CA)

## (undated) DEVICE — SYRINGE SAFETY 10 ML 18 GA X 1 1/2 BLUNT LL (100/BX 4BX/CA)

## (undated) DEVICE — ELECTRODE DUAL RETURN W/ CORD - (50/PK)

## (undated) DEVICE — MASK WITH FACE SHIELD (25/BX 4BX/CA)

## (undated) DEVICE — TUBE SUCTION YANKAUER  1/4 X 6FT (20EA/CA)"

## (undated) DEVICE — CANISTER SUCTION RIGID RED 1500CC (40EA/CA)

## (undated) DEVICE — KIT  I.V. START (100EA/CA)

## (undated) DEVICE — PAD PREP 24 X 48 CUFFED - (100/CA)

## (undated) DEVICE — TUBE CONNECTING SUCTION - CLEAR PLASTIC STERILE 72 IN (50EA/CA)

## (undated) DEVICE — SYRINGE SAFETY 5 ML 18 GA X 1-1/2 BLUNT LL (100/BX 4BX/CA)

## (undated) DEVICE — CANNULA O2 COMFORT SOFT EAR ADULT 7 FT TUBING (50/CA)

## (undated) DEVICE — SYRINGE SAFETY 3 ML 18 GA X 1 1/2 BLUNT LL (100/BX 8BX/CA)